# Patient Record
Sex: MALE | Race: WHITE | NOT HISPANIC OR LATINO | Employment: OTHER | URBAN - METROPOLITAN AREA
[De-identification: names, ages, dates, MRNs, and addresses within clinical notes are randomized per-mention and may not be internally consistent; named-entity substitution may affect disease eponyms.]

---

## 2017-01-03 ENCOUNTER — ALLSCRIPTS OFFICE VISIT (OUTPATIENT)
Dept: OTHER | Facility: OTHER | Age: 82
End: 2017-01-03

## 2017-05-08 ENCOUNTER — ALLSCRIPTS OFFICE VISIT (OUTPATIENT)
Dept: OTHER | Facility: OTHER | Age: 82
End: 2017-05-08

## 2017-05-08 DIAGNOSIS — M54.9 DORSALGIA: ICD-10-CM

## 2017-06-01 ENCOUNTER — HOSPITAL ENCOUNTER (OUTPATIENT)
Dept: NON INVASIVE DIAGNOSTICS | Facility: CLINIC | Age: 82
Discharge: HOME | End: 2017-06-01
Payer: MEDICARE

## 2017-06-01 DIAGNOSIS — M54.9 DORSALGIA: ICD-10-CM

## 2017-06-01 PROCEDURE — 93925 LOWER EXTREMITY STUDY: CPT

## 2017-06-01 PROCEDURE — 93923 UPR/LXTR ART STDY 3+ LVLS: CPT

## 2018-01-01 ENCOUNTER — APPOINTMENT (EMERGENCY)
Dept: RADIOLOGY | Facility: HOSPITAL | Age: 83
DRG: 282 | End: 2018-01-01
Payer: MEDICARE

## 2018-01-01 ENCOUNTER — APPOINTMENT (EMERGENCY)
Dept: CT IMAGING | Facility: HOSPITAL | Age: 83
DRG: 282 | End: 2018-01-01
Payer: MEDICARE

## 2018-01-01 ENCOUNTER — HOSPITAL ENCOUNTER (INPATIENT)
Facility: HOSPITAL | Age: 83
LOS: 4 days | Discharge: HOME HEALTH | DRG: 282 | End: 2018-06-25
Attending: EMERGENCY MEDICINE | Admitting: HOSPITALIST
Payer: MEDICARE

## 2018-01-01 ENCOUNTER — APPOINTMENT (INPATIENT)
Dept: RADIOLOGY | Facility: HOSPITAL | Age: 83
DRG: 282 | End: 2018-01-01
Payer: MEDICARE

## 2018-01-01 ENCOUNTER — APPOINTMENT (INPATIENT)
Dept: NON INVASIVE DIAGNOSTICS | Facility: HOSPITAL | Age: 83
DRG: 282 | End: 2018-01-01
Payer: MEDICARE

## 2018-01-01 VITALS
BODY MASS INDEX: 23.58 KG/M2 | HEART RATE: 68 BPM | TEMPERATURE: 97.9 F | OXYGEN SATURATION: 98 % | WEIGHT: 164.68 LBS | HEIGHT: 70 IN | DIASTOLIC BLOOD PRESSURE: 67 MMHG | SYSTOLIC BLOOD PRESSURE: 116 MMHG | RESPIRATION RATE: 20 BRPM

## 2018-01-01 DIAGNOSIS — I50.23 ACUTE ON CHRONIC SYSTOLIC CONGESTIVE HEART FAILURE (HCC): ICD-10-CM

## 2018-01-01 DIAGNOSIS — R77.8 ELEVATED TROPONIN: ICD-10-CM

## 2018-01-01 DIAGNOSIS — I48.0 PAROXYSMAL A-FIB (HCC): ICD-10-CM

## 2018-01-01 DIAGNOSIS — I21.A1 TYPE 2 MYOCARDIAL INFARCTION (HCC): ICD-10-CM

## 2018-01-01 DIAGNOSIS — I50.9 CHF (CONGESTIVE HEART FAILURE) (HCC): Primary | ICD-10-CM

## 2018-01-01 DIAGNOSIS — R79.89 RAISED TSH LEVEL: ICD-10-CM

## 2018-01-01 DIAGNOSIS — J90 PLEURAL EFFUSION, BILATERAL: ICD-10-CM

## 2018-01-01 DIAGNOSIS — J18.9 PNEUMONIA: ICD-10-CM

## 2018-01-01 DIAGNOSIS — I10 ESSENTIAL HYPERTENSION: ICD-10-CM

## 2018-01-01 DIAGNOSIS — L03.115 CELLULITIS OF BOTH LOWER EXTREMITIES: ICD-10-CM

## 2018-01-01 DIAGNOSIS — L03.116 CELLULITIS OF BOTH LOWER EXTREMITIES: ICD-10-CM

## 2018-01-01 DIAGNOSIS — I50.9 ACUTE CONGESTIVE HEART FAILURE, UNSPECIFIED HEART FAILURE TYPE (HCC): ICD-10-CM

## 2018-01-01 DIAGNOSIS — I42.9 CARDIOMYOPATHY (HCC): ICD-10-CM

## 2018-01-01 DIAGNOSIS — I48.91 ATRIAL FIBRILLATION (HCC): ICD-10-CM

## 2018-01-01 DIAGNOSIS — I87.2 CHRONIC VENOUS INSUFFICIENCY: ICD-10-CM

## 2018-01-01 LAB
ALBUMIN SERPL BCP-MCNC: 3.3 G/DL (ref 3.5–5)
ALP SERPL-CCNC: 99 U/L (ref 46–116)
ALT SERPL W P-5'-P-CCNC: 37 U/L (ref 12–78)
ANION GAP SERPL CALCULATED.3IONS-SCNC: 1 MMOL/L (ref 4–13)
ANION GAP SERPL CALCULATED.3IONS-SCNC: 3 MMOL/L (ref 4–13)
ANION GAP SERPL CALCULATED.3IONS-SCNC: 5 MMOL/L (ref 4–13)
ANION GAP SERPL CALCULATED.3IONS-SCNC: 6 MMOL/L (ref 4–13)
ANION GAP SERPL CALCULATED.3IONS-SCNC: 7 MMOL/L (ref 4–13)
APTT PPP: 30 SECONDS (ref 24–36)
ARTERIAL PATENCY WRIST A: YES
AST SERPL W P-5'-P-CCNC: 37 U/L (ref 5–45)
ATRIAL RATE: 105 BPM
BACTERIA BLD CULT: NORMAL
BACTERIA BLD CULT: NORMAL
BASE EXCESS BLDA CALC-SCNC: 3.1 MMOL/L
BASOPHILS # BLD AUTO: 0.02 THOUSANDS/ΜL (ref 0–0.1)
BASOPHILS NFR BLD AUTO: 0 % (ref 0–1)
BILIRUB SERPL-MCNC: 0.5 MG/DL (ref 0.2–1)
BUN SERPL-MCNC: 30 MG/DL (ref 5–25)
BUN SERPL-MCNC: 31 MG/DL (ref 5–25)
BUN SERPL-MCNC: 35 MG/DL (ref 5–25)
BUN SERPL-MCNC: 44 MG/DL (ref 5–25)
BUN SERPL-MCNC: 47 MG/DL (ref 5–25)
CALCIUM SERPL-MCNC: 8.2 MG/DL (ref 8.3–10.1)
CALCIUM SERPL-MCNC: 8.3 MG/DL (ref 8.3–10.1)
CALCIUM SERPL-MCNC: 8.7 MG/DL (ref 8.3–10.1)
CALCIUM SERPL-MCNC: 8.9 MG/DL (ref 8.3–10.1)
CALCIUM SERPL-MCNC: 8.9 MG/DL (ref 8.3–10.1)
CHLORIDE SERPL-SCNC: 102 MMOL/L (ref 100–108)
CHLORIDE SERPL-SCNC: 102 MMOL/L (ref 100–108)
CHLORIDE SERPL-SCNC: 103 MMOL/L (ref 100–108)
CHLORIDE SERPL-SCNC: 103 MMOL/L (ref 100–108)
CHLORIDE SERPL-SCNC: 104 MMOL/L (ref 100–108)
CO2 SERPL-SCNC: 31 MMOL/L (ref 21–32)
CO2 SERPL-SCNC: 32 MMOL/L (ref 21–32)
CO2 SERPL-SCNC: 35 MMOL/L (ref 21–32)
CO2 SERPL-SCNC: 36 MMOL/L (ref 21–32)
CO2 SERPL-SCNC: 36 MMOL/L (ref 21–32)
CREAT SERPL-MCNC: 1.02 MG/DL (ref 0.6–1.3)
CREAT SERPL-MCNC: 1.05 MG/DL (ref 0.6–1.3)
CREAT SERPL-MCNC: 1.12 MG/DL (ref 0.6–1.3)
CREAT SERPL-MCNC: 1.16 MG/DL (ref 0.6–1.3)
CREAT SERPL-MCNC: 1.24 MG/DL (ref 0.6–1.3)
DEPRECATED D DIMER PPP: 1490 NG/ML (FEU) (ref 0–424)
EOSINOPHIL # BLD AUTO: 0.03 THOUSAND/ΜL (ref 0–0.61)
EOSINOPHIL NFR BLD AUTO: 1 % (ref 0–6)
ERYTHROCYTE [DISTWIDTH] IN BLOOD BY AUTOMATED COUNT: 15.4 % (ref 11.6–15.1)
ERYTHROCYTE [DISTWIDTH] IN BLOOD BY AUTOMATED COUNT: 15.5 % (ref 11.6–15.1)
GFR SERPL CREATININE-BSD FRML MDRD: 54 ML/MIN/1.73SQ M
GFR SERPL CREATININE-BSD FRML MDRD: 59 ML/MIN/1.73SQ M
GFR SERPL CREATININE-BSD FRML MDRD: 61 ML/MIN/1.73SQ M
GFR SERPL CREATININE-BSD FRML MDRD: 66 ML/MIN/1.73SQ M
GFR SERPL CREATININE-BSD FRML MDRD: 69 ML/MIN/1.73SQ M
GLUCOSE SERPL-MCNC: 102 MG/DL (ref 65–140)
GLUCOSE SERPL-MCNC: 82 MG/DL (ref 65–140)
GLUCOSE SERPL-MCNC: 82 MG/DL (ref 65–140)
GLUCOSE SERPL-MCNC: 85 MG/DL (ref 65–140)
GLUCOSE SERPL-MCNC: 89 MG/DL (ref 65–140)
HCO3 BLDA-SCNC: 28.6 MMOL/L (ref 22–28)
HCT VFR BLD AUTO: 39.2 % (ref 36.5–49.3)
HCT VFR BLD AUTO: 41.4 % (ref 36.5–49.3)
HGB BLD-MCNC: 11.9 G/DL (ref 12–17)
HGB BLD-MCNC: 12.7 G/DL (ref 12–17)
INR PPP: 1.08 (ref 0.86–1.17)
L PNEUMO1 AG UR QL IA.RAPID: NEGATIVE
LACTATE SERPL-SCNC: 1.8 MMOL/L (ref 0.5–2)
LYMPHOCYTES # BLD AUTO: 1.37 THOUSANDS/ΜL (ref 0.6–4.47)
LYMPHOCYTES NFR BLD AUTO: 27 % (ref 14–44)
MCH RBC QN AUTO: 29.4 PG (ref 26.8–34.3)
MCH RBC QN AUTO: 29.5 PG (ref 26.8–34.3)
MCHC RBC AUTO-ENTMCNC: 30.4 G/DL (ref 31.4–37.4)
MCHC RBC AUTO-ENTMCNC: 30.7 G/DL (ref 31.4–37.4)
MCV RBC AUTO: 96 FL (ref 82–98)
MCV RBC AUTO: 97 FL (ref 82–98)
MONOCYTES # BLD AUTO: 0.49 THOUSAND/ΜL (ref 0.17–1.22)
MONOCYTES NFR BLD AUTO: 10 % (ref 4–12)
NEUTROPHILS # BLD AUTO: 3.26 THOUSANDS/ΜL (ref 1.85–7.62)
NEUTS SEG NFR BLD AUTO: 63 % (ref 43–75)
NON VENT ROOM AIR: 21 %
NT-PROBNP SERPL-MCNC: 6165 PG/ML
O2 CT BLDA-SCNC: 17.2 ML/DL (ref 16–23)
OXYHGB MFR BLDA: 93.2 % (ref 94–97)
P AXIS: 94 DEGREES
PCO2 BLDA: 47 MM HG (ref 36–44)
PH BLDA: 7.4 [PH] (ref 7.35–7.45)
PLATELET # BLD AUTO: 108 THOUSANDS/UL (ref 149–390)
PLATELET # BLD AUTO: 112 THOUSANDS/UL (ref 149–390)
PLATELET # BLD AUTO: 116 THOUSANDS/UL (ref 149–390)
PMV BLD AUTO: 12.6 FL (ref 8.9–12.7)
PMV BLD AUTO: 13 FL (ref 8.9–12.7)
PMV BLD AUTO: 13.2 FL (ref 8.9–12.7)
PO2 BLDA: 73.6 MM HG (ref 75–129)
POTASSIUM SERPL-SCNC: 3.9 MMOL/L (ref 3.5–5.3)
POTASSIUM SERPL-SCNC: 4 MMOL/L (ref 3.5–5.3)
POTASSIUM SERPL-SCNC: 4 MMOL/L (ref 3.5–5.3)
POTASSIUM SERPL-SCNC: 4.1 MMOL/L (ref 3.5–5.3)
POTASSIUM SERPL-SCNC: 4.1 MMOL/L (ref 3.5–5.3)
PROCALCITONIN SERPL-MCNC: <0.05 NG/ML
PROT SERPL-MCNC: 7.3 G/DL (ref 6.4–8.2)
PROTHROMBIN TIME: 13.7 SECONDS (ref 11.8–14.2)
QRS AXIS: -39 DEGREES
QRSD INTERVAL: 94 MS
QT INTERVAL: 376 MS
QTC INTERVAL: 496 MS
RBC # BLD AUTO: 4.04 MILLION/UL (ref 3.88–5.62)
RBC # BLD AUTO: 4.32 MILLION/UL (ref 3.88–5.62)
S PNEUM AG UR QL: NEGATIVE
SODIUM SERPL-SCNC: 140 MMOL/L (ref 136–145)
SODIUM SERPL-SCNC: 141 MMOL/L (ref 136–145)
SODIUM SERPL-SCNC: 141 MMOL/L (ref 136–145)
SODIUM SERPL-SCNC: 142 MMOL/L (ref 136–145)
SODIUM SERPL-SCNC: 142 MMOL/L (ref 136–145)
SPECIMEN SOURCE: ABNORMAL
T WAVE AXIS: 65 DEGREES
TROPONIN I SERPL-MCNC: 0.11 NG/ML
TROPONIN I SERPL-MCNC: 0.12 NG/ML
TROPONIN I SERPL-MCNC: 0.14 NG/ML
TROPONIN I SERPL-MCNC: 0.15 NG/ML
TROPONIN I SERPL-MCNC: 0.16 NG/ML
TSH SERPL DL<=0.05 MIU/L-ACNC: 4.49 UIU/ML (ref 0.36–3.74)
VENTRICULAR RATE: 105 BPM
WBC # BLD AUTO: 5.17 THOUSAND/UL (ref 4.31–10.16)
WBC # BLD AUTO: 5.87 THOUSAND/UL (ref 4.31–10.16)

## 2018-01-01 PROCEDURE — 87040 BLOOD CULTURE FOR BACTERIA: CPT | Performed by: EMERGENCY MEDICINE

## 2018-01-01 PROCEDURE — 80048 BASIC METABOLIC PNL TOTAL CA: CPT | Performed by: INTERNAL MEDICINE

## 2018-01-01 PROCEDURE — 93010 ELECTROCARDIOGRAM REPORT: CPT | Performed by: INTERNAL MEDICINE

## 2018-01-01 PROCEDURE — 84484 ASSAY OF TROPONIN QUANT: CPT | Performed by: PHYSICIAN ASSISTANT

## 2018-01-01 PROCEDURE — 99232 SBSQ HOSP IP/OBS MODERATE 35: CPT | Performed by: INTERNAL MEDICINE

## 2018-01-01 PROCEDURE — G8978 MOBILITY CURRENT STATUS: HCPCS

## 2018-01-01 PROCEDURE — 83605 ASSAY OF LACTIC ACID: CPT | Performed by: EMERGENCY MEDICINE

## 2018-01-01 PROCEDURE — 99285 EMERGENCY DEPT VISIT HI MDM: CPT

## 2018-01-01 PROCEDURE — 99223 1ST HOSP IP/OBS HIGH 75: CPT | Performed by: INTERNAL MEDICINE

## 2018-01-01 PROCEDURE — 85730 THROMBOPLASTIN TIME PARTIAL: CPT | Performed by: EMERGENCY MEDICINE

## 2018-01-01 PROCEDURE — 36415 COLL VENOUS BLD VENIPUNCTURE: CPT | Performed by: EMERGENCY MEDICINE

## 2018-01-01 PROCEDURE — 84145 PROCALCITONIN (PCT): CPT | Performed by: INTERNAL MEDICINE

## 2018-01-01 PROCEDURE — 82805 BLOOD GASES W/O2 SATURATION: CPT | Performed by: EMERGENCY MEDICINE

## 2018-01-01 PROCEDURE — 94640 AIRWAY INHALATION TREATMENT: CPT

## 2018-01-01 PROCEDURE — 85610 PROTHROMBIN TIME: CPT | Performed by: EMERGENCY MEDICINE

## 2018-01-01 PROCEDURE — 93306 TTE W/DOPPLER COMPLETE: CPT | Performed by: INTERNAL MEDICINE

## 2018-01-01 PROCEDURE — 93005 ELECTROCARDIOGRAM TRACING: CPT

## 2018-01-01 PROCEDURE — 97530 THERAPEUTIC ACTIVITIES: CPT

## 2018-01-01 PROCEDURE — 90715 TDAP VACCINE 7 YRS/> IM: CPT | Performed by: EMERGENCY MEDICINE

## 2018-01-01 PROCEDURE — 71045 X-RAY EXAM CHEST 1 VIEW: CPT

## 2018-01-01 PROCEDURE — 71275 CT ANGIOGRAPHY CHEST: CPT

## 2018-01-01 PROCEDURE — 99233 SBSQ HOSP IP/OBS HIGH 50: CPT | Performed by: INTERNAL MEDICINE

## 2018-01-01 PROCEDURE — 84484 ASSAY OF TROPONIN QUANT: CPT | Performed by: EMERGENCY MEDICINE

## 2018-01-01 PROCEDURE — 99239 HOSP IP/OBS DSCHRG MGMT >30: CPT | Performed by: INTERNAL MEDICINE

## 2018-01-01 PROCEDURE — 85379 FIBRIN DEGRADATION QUANT: CPT | Performed by: EMERGENCY MEDICINE

## 2018-01-01 PROCEDURE — 36600 WITHDRAWAL OF ARTERIAL BLOOD: CPT

## 2018-01-01 PROCEDURE — 85027 COMPLETE CBC AUTOMATED: CPT | Performed by: PHYSICIAN ASSISTANT

## 2018-01-01 PROCEDURE — 97163 PT EVAL HIGH COMPLEX 45 MIN: CPT

## 2018-01-01 PROCEDURE — 94664 DEMO&/EVAL PT USE INHALER: CPT

## 2018-01-01 PROCEDURE — 93306 TTE W/DOPPLER COMPLETE: CPT

## 2018-01-01 PROCEDURE — 87449 NOS EACH ORGANISM AG IA: CPT | Performed by: PHYSICIAN ASSISTANT

## 2018-01-01 PROCEDURE — 85025 COMPLETE CBC W/AUTO DIFF WBC: CPT | Performed by: EMERGENCY MEDICINE

## 2018-01-01 PROCEDURE — 80053 COMPREHEN METABOLIC PANEL: CPT | Performed by: EMERGENCY MEDICINE

## 2018-01-01 PROCEDURE — 99222 1ST HOSP IP/OBS MODERATE 55: CPT | Performed by: INTERNAL MEDICINE

## 2018-01-01 PROCEDURE — 84484 ASSAY OF TROPONIN QUANT: CPT | Performed by: INTERNAL MEDICINE

## 2018-01-01 PROCEDURE — 83880 ASSAY OF NATRIURETIC PEPTIDE: CPT | Performed by: EMERGENCY MEDICINE

## 2018-01-01 PROCEDURE — 85049 AUTOMATED PLATELET COUNT: CPT | Performed by: PHYSICIAN ASSISTANT

## 2018-01-01 PROCEDURE — G8979 MOBILITY GOAL STATUS: HCPCS

## 2018-01-01 PROCEDURE — 80048 BASIC METABOLIC PNL TOTAL CA: CPT | Performed by: PHYSICIAN ASSISTANT

## 2018-01-01 PROCEDURE — 84443 ASSAY THYROID STIM HORMONE: CPT | Performed by: EMERGENCY MEDICINE

## 2018-01-01 RX ORDER — ALBUMIN, HUMAN INJ 5% 5 %
12.5 SOLUTION INTRAVENOUS ONCE
Status: COMPLETED | OUTPATIENT
Start: 2018-01-01 | End: 2018-01-01

## 2018-01-01 RX ORDER — ASPIRIN 81 MG/1
81 TABLET, CHEWABLE ORAL DAILY
Qty: 30 TABLET | Refills: 0 | Status: SHIPPED | OUTPATIENT
Start: 2018-01-01 | End: 2019-01-01 | Stop reason: HOSPADM

## 2018-01-01 RX ORDER — METOPROLOL SUCCINATE 50 MG/1
50 TABLET, EXTENDED RELEASE ORAL 2 TIMES DAILY
Qty: 60 TABLET | Refills: 0 | Status: SHIPPED | OUTPATIENT
Start: 2018-01-01 | End: 2019-01-01 | Stop reason: CLARIF

## 2018-01-01 RX ORDER — NITROGLYCERIN 0.4 MG/1
0.4 TABLET SUBLINGUAL
Status: DISCONTINUED | OUTPATIENT
Start: 2018-01-01 | End: 2018-01-01 | Stop reason: HOSPADM

## 2018-01-01 RX ORDER — FUROSEMIDE 10 MG/ML
20 INJECTION INTRAMUSCULAR; INTRAVENOUS ONCE
Status: COMPLETED | OUTPATIENT
Start: 2018-01-01 | End: 2018-01-01

## 2018-01-01 RX ORDER — LISINOPRIL 10 MG/1
10 TABLET ORAL DAILY
Status: DISCONTINUED | OUTPATIENT
Start: 2018-01-01 | End: 2018-01-01 | Stop reason: HOSPADM

## 2018-01-01 RX ORDER — VANCOMYCIN HYDROCHLORIDE 1 G/200ML
1000 INJECTION, SOLUTION INTRAVENOUS ONCE
Status: DISCONTINUED | OUTPATIENT
Start: 2018-01-01 | End: 2018-01-01

## 2018-01-01 RX ORDER — FUROSEMIDE 40 MG/1
40 TABLET ORAL DAILY
Qty: 30 TABLET | Refills: 0 | Status: SHIPPED | OUTPATIENT
Start: 2018-01-01 | End: 2018-01-01

## 2018-01-01 RX ORDER — ASPIRIN 81 MG/1
81 TABLET, CHEWABLE ORAL DAILY
Status: DISCONTINUED | OUTPATIENT
Start: 2018-01-01 | End: 2018-01-01 | Stop reason: HOSPADM

## 2018-01-01 RX ORDER — ACETAMINOPHEN 325 MG/1
650 TABLET ORAL EVERY 6 HOURS PRN
Status: DISCONTINUED | OUTPATIENT
Start: 2018-01-01 | End: 2018-01-01 | Stop reason: HOSPADM

## 2018-01-01 RX ORDER — METOPROLOL TARTRATE 50 MG/1
50 TABLET, FILM COATED ORAL EVERY 12 HOURS SCHEDULED
Status: DISCONTINUED | OUTPATIENT
Start: 2018-01-01 | End: 2018-01-01

## 2018-01-01 RX ORDER — METOPROLOL SUCCINATE 50 MG/1
50 TABLET, EXTENDED RELEASE ORAL 2 TIMES DAILY
Status: DISCONTINUED | OUTPATIENT
Start: 2018-01-01 | End: 2018-01-01

## 2018-01-01 RX ORDER — FINASTERIDE 5 MG/1
5 TABLET, FILM COATED ORAL DAILY
Status: DISCONTINUED | OUTPATIENT
Start: 2018-01-01 | End: 2018-01-01 | Stop reason: HOSPADM

## 2018-01-01 RX ORDER — METOPROLOL SUCCINATE 50 MG/1
50 TABLET, EXTENDED RELEASE ORAL 2 TIMES DAILY
Status: DISCONTINUED | OUTPATIENT
Start: 2018-01-01 | End: 2018-01-01 | Stop reason: HOSPADM

## 2018-01-01 RX ORDER — FUROSEMIDE 40 MG/1
40 TABLET ORAL DAILY
Status: DISCONTINUED | OUTPATIENT
Start: 2018-01-01 | End: 2018-01-01

## 2018-01-01 RX ORDER — ALBUTEROL SULFATE 2.5 MG/3ML
5 SOLUTION RESPIRATORY (INHALATION) ONCE
Status: COMPLETED | OUTPATIENT
Start: 2018-01-01 | End: 2018-01-01

## 2018-01-01 RX ORDER — FUROSEMIDE 10 MG/ML
40 INJECTION INTRAMUSCULAR; INTRAVENOUS 2 TIMES DAILY
Status: DISPENSED | OUTPATIENT
Start: 2018-01-01 | End: 2018-01-01

## 2018-01-01 RX ORDER — LISINOPRIL 5 MG/1
5 TABLET ORAL DAILY
Status: DISCONTINUED | OUTPATIENT
Start: 2018-01-01 | End: 2018-01-01

## 2018-01-01 RX ORDER — METOPROLOL SUCCINATE 50 MG/1
50 TABLET, EXTENDED RELEASE ORAL 2 TIMES DAILY
Qty: 60 TABLET | Refills: 0 | Status: SHIPPED | OUTPATIENT
Start: 2018-01-01 | End: 2018-01-01

## 2018-01-01 RX ORDER — LISINOPRIL 10 MG/1
10 TABLET ORAL DAILY
Qty: 30 TABLET | Refills: 0 | Status: SHIPPED | OUTPATIENT
Start: 2018-01-01 | End: 2018-01-01

## 2018-01-01 RX ORDER — AZITHROMYCIN 250 MG/1
500 TABLET, FILM COATED ORAL ONCE
Status: COMPLETED | OUTPATIENT
Start: 2018-01-01 | End: 2018-01-01

## 2018-01-01 RX ORDER — FUROSEMIDE 10 MG/ML
40 INJECTION INTRAMUSCULAR; INTRAVENOUS
Status: DISCONTINUED | OUTPATIENT
Start: 2018-01-01 | End: 2018-01-01

## 2018-01-01 RX ORDER — ONDANSETRON 2 MG/ML
4 INJECTION INTRAMUSCULAR; INTRAVENOUS EVERY 6 HOURS PRN
Status: DISCONTINUED | OUTPATIENT
Start: 2018-01-01 | End: 2018-01-01 | Stop reason: HOSPADM

## 2018-01-01 RX ORDER — LISINOPRIL 10 MG/1
10 TABLET ORAL DAILY
Qty: 30 TABLET | Refills: 0 | Status: SHIPPED | OUTPATIENT
Start: 2018-01-01 | End: 2019-01-01 | Stop reason: HOSPADM

## 2018-01-01 RX ORDER — LISINOPRIL 2.5 MG/1
2.5 TABLET ORAL DAILY
Status: DISCONTINUED | OUTPATIENT
Start: 2018-01-01 | End: 2018-01-01

## 2018-01-01 RX ORDER — FUROSEMIDE 40 MG/1
40 TABLET ORAL
Status: DISCONTINUED | OUTPATIENT
Start: 2018-01-01 | End: 2018-01-01

## 2018-01-01 RX ORDER — FUROSEMIDE 40 MG/1
40 TABLET ORAL DAILY
Qty: 30 TABLET | Refills: 0 | Status: SHIPPED | OUTPATIENT
Start: 2018-01-01 | End: 2019-01-01 | Stop reason: HOSPADM

## 2018-01-01 RX ORDER — ALUMINUM ZIRCONIUM OCTACHLOROHYDREX GLY 16 G/100G
1 GEL TOPICAL DAILY
COMMUNITY
Start: 2017-01-03 | End: 2019-01-01 | Stop reason: HOSPADM

## 2018-01-01 RX ORDER — FUROSEMIDE 10 MG/ML
40 INJECTION INTRAMUSCULAR; INTRAVENOUS
Status: COMPLETED | OUTPATIENT
Start: 2018-01-01 | End: 2018-01-01

## 2018-01-01 RX ORDER — ASPIRIN 81 MG/1
162 TABLET, CHEWABLE ORAL ONCE
Status: COMPLETED | OUTPATIENT
Start: 2018-01-01 | End: 2018-01-01

## 2018-01-01 RX ORDER — FUROSEMIDE 40 MG/1
40 TABLET ORAL
Status: DISCONTINUED | OUTPATIENT
Start: 2018-01-01 | End: 2018-01-01 | Stop reason: HOSPADM

## 2018-01-01 RX ORDER — ASPIRIN 81 MG/1
81 TABLET, CHEWABLE ORAL DAILY
Qty: 30 TABLET | Refills: 0 | Status: SHIPPED | OUTPATIENT
Start: 2018-01-01 | End: 2018-01-01

## 2018-01-01 RX ADMIN — FINASTERIDE 5 MG: 5 TABLET, FILM COATED ORAL at 08:54

## 2018-01-01 RX ADMIN — LISINOPRIL 2.5 MG: 2.5 TABLET ORAL at 09:19

## 2018-01-01 RX ADMIN — FINASTERIDE 5 MG: 5 TABLET, FILM COATED ORAL at 12:11

## 2018-01-01 RX ADMIN — FUROSEMIDE 40 MG: 40 TABLET ORAL at 08:54

## 2018-01-01 RX ADMIN — ASPIRIN 81 MG 81 MG: 81 TABLET ORAL at 08:54

## 2018-01-01 RX ADMIN — FUROSEMIDE 40 MG: 10 INJECTION, SOLUTION INTRAMUSCULAR; INTRAVENOUS at 11:27

## 2018-01-01 RX ADMIN — ASPIRIN 81 MG 81 MG: 81 TABLET ORAL at 12:11

## 2018-01-01 RX ADMIN — TETANUS TOXOID, REDUCED DIPHTHERIA TOXOID AND ACELLULAR PERTUSSIS VACCINE, ADSORBED 0.5 ML: 5; 2.5; 8; 8; 2.5 SUSPENSION INTRAMUSCULAR at 05:55

## 2018-01-01 RX ADMIN — ASPIRIN 81 MG 81 MG: 81 TABLET ORAL at 09:19

## 2018-01-01 RX ADMIN — METOPROLOL SUCCINATE 50 MG: 50 TABLET, EXTENDED RELEASE ORAL at 16:54

## 2018-01-01 RX ADMIN — AZITHROMYCIN 500 MG: 250 TABLET, FILM COATED ORAL at 06:10

## 2018-01-01 RX ADMIN — ENOXAPARIN SODIUM 40 MG: 100 INJECTION SUBCUTANEOUS at 09:19

## 2018-01-01 RX ADMIN — FUROSEMIDE 20 MG: 10 INJECTION, SOLUTION INTRAMUSCULAR; INTRAVENOUS at 05:56

## 2018-01-01 RX ADMIN — FINASTERIDE 5 MG: 5 TABLET, FILM COATED ORAL at 08:29

## 2018-01-01 RX ADMIN — METOPROLOL SUCCINATE 50 MG: 50 TABLET, EXTENDED RELEASE ORAL at 08:29

## 2018-01-01 RX ADMIN — FUROSEMIDE 40 MG: 10 INJECTION, SOLUTION INTRAMUSCULAR; INTRAVENOUS at 16:54

## 2018-01-01 RX ADMIN — METOPROLOL TARTRATE 50 MG: 50 TABLET ORAL at 22:36

## 2018-01-01 RX ADMIN — DILTIAZEM HYDROCHLORIDE 90 MG: 60 TABLET, FILM COATED ORAL at 11:27

## 2018-01-01 RX ADMIN — METOPROLOL SUCCINATE 50 MG: 50 TABLET, EXTENDED RELEASE ORAL at 08:56

## 2018-01-01 RX ADMIN — FINASTERIDE 5 MG: 5 TABLET, FILM COATED ORAL at 09:19

## 2018-01-01 RX ADMIN — NITROGLYCERIN 0.5 INCH: 20 OINTMENT TOPICAL at 05:56

## 2018-01-01 RX ADMIN — ASPIRIN 81 MG 162 MG: 81 TABLET ORAL at 04:23

## 2018-01-01 RX ADMIN — ENOXAPARIN SODIUM 40 MG: 100 INJECTION SUBCUTANEOUS at 08:29

## 2018-01-01 RX ADMIN — ALBUMIN HUMAN 12.5 G: 0.05 INJECTION, SOLUTION INTRAVENOUS at 01:37

## 2018-01-01 RX ADMIN — ALBUTEROL SULFATE 5 MG: 2.5 SOLUTION RESPIRATORY (INHALATION) at 02:56

## 2018-01-01 RX ADMIN — ENOXAPARIN SODIUM 40 MG: 100 INJECTION SUBCUTANEOUS at 11:26

## 2018-01-01 RX ADMIN — FINASTERIDE 5 MG: 5 TABLET, FILM COATED ORAL at 11:27

## 2018-01-01 RX ADMIN — LISINOPRIL 10 MG: 10 TABLET ORAL at 08:29

## 2018-01-01 RX ADMIN — ASPIRIN 81 MG 81 MG: 81 TABLET ORAL at 08:29

## 2018-01-01 RX ADMIN — ENOXAPARIN SODIUM 40 MG: 100 INJECTION SUBCUTANEOUS at 08:54

## 2018-01-01 RX ADMIN — LISINOPRIL 2.5 MG: 2.5 TABLET ORAL at 11:27

## 2018-01-01 RX ADMIN — METOPROLOL TARTRATE 25 MG: 25 TABLET ORAL at 15:05

## 2018-01-01 RX ADMIN — FUROSEMIDE 40 MG: 40 TABLET ORAL at 16:57

## 2018-01-01 RX ADMIN — CEFTRIAXONE 1000 MG: 1 INJECTION, POWDER, FOR SOLUTION INTRAMUSCULAR; INTRAVENOUS at 06:10

## 2018-01-01 RX ADMIN — ENOXAPARIN SODIUM 40 MG: 100 INJECTION SUBCUTANEOUS at 12:11

## 2018-01-01 RX ADMIN — METOPROLOL SUCCINATE 50 MG: 50 TABLET, EXTENDED RELEASE ORAL at 16:57

## 2018-01-01 RX ADMIN — METOPROLOL TARTRATE 25 MG: 25 TABLET ORAL at 09:19

## 2018-01-01 RX ADMIN — FUROSEMIDE 40 MG: 10 INJECTION, SOLUTION INTRAMUSCULAR; INTRAVENOUS at 09:19

## 2018-01-01 RX ADMIN — IOHEXOL 85 ML: 350 INJECTION, SOLUTION INTRAVENOUS at 04:48

## 2018-01-01 RX ADMIN — FUROSEMIDE 40 MG: 40 TABLET ORAL at 12:09

## 2018-01-11 ENCOUNTER — ALLSCRIPTS OFFICE VISIT (OUTPATIENT)
Dept: OTHER | Facility: OTHER | Age: 83
End: 2018-01-11

## 2018-01-12 VITALS
RESPIRATION RATE: 18 BRPM | SYSTOLIC BLOOD PRESSURE: 118 MMHG | HEIGHT: 69 IN | HEART RATE: 70 BPM | DIASTOLIC BLOOD PRESSURE: 60 MMHG

## 2018-01-13 NOTE — PROGRESS NOTES
Assessment   1  Chronic venous insufficiency (459 81) (I87 2)   2  Skin ulcer of left lower leg, limited to breakdown of skin (487 10) (X81 081)    Plan   Chronic venous insufficiency    · Apply moisturizing cream or lotion several times a day ; Status:Complete;   Done:    76VFL6961   Ordered; For:Chronic venous insufficiency; Ordered By:Sydni Lawrence;   · Begin or continue regular aerobic exercise  Gradually work up to at least {count1}    sessions of {dur1} of exercise a week ; Status:Complete;   Done: 21SDM9773   Ordered; For:Chronic venous insufficiency; Ordered By:Sydni Lawrence;   · Keep your leg elevated whenever you can to decrease swelling and increase circulation ;    Status:Complete;   Done: 07AHH0980   Ordered; For:Chronic venous insufficiency; Ordered By:Sydni Lawrence;   · Support stockings can help keep the blood from pooling in the small veins in your feet    and legs ; Status:Complete;   Done: 84ILF0882   Ordered; For:Chronic venous insufficiency; Ordered By:Sydni Lawrence;   · Follow-up PRN Evaluation and Treatment  Follow-up  Status: Complete  Done:    54AQY2754   Ordered; For: Chronic venous insufficiency; Ordered By: Mckenna Garza Performed:  Due: 01PVO9480    Discussion/Summary   Discussion Summary[de-identified] y/o M with history of back surgery with subsequent weakness to bilateral lower extremities, which has left him minimally ambulatory and essentially wheelchair bound  He has chronic bilateral lower extremity edema and discoloration consistent with chronic venous insufficiency  He recently sustained a fall at living facility with skin tear to proximal lateral calf  Per RN with patient the wound is healing  Wound is healthy with no signs infection  He should continue daily use of compression stockings to help manage edema and aid in healing of wound  We also discussed the benefits of frequent elevation, heel toe exercises while in wheelchair and application of moisturizers to the lower extremities  Compression stockings should be replaced every 6 months  Followup as needed  Chief Complaint   Chief Complaint Free Text Note Form: I'm here to check my legs  They are always discolored  History of Present Illness   HPI: Patient returns to office for evaluation after fall  He has history of back surgery with resultant bilateral lower extremity weakness, which has left him essentially wheelchair bound  He reports chronic back pain  He has purple discoloration to bilateral legs consistent with chronic venous insufficiency  RN accompanying patient to visit today states that when he elevates the discoloration resolves  He spends most of the day in the wheelchair  He has been compliant with wearing compression daily  He recently sustained a fall with a skin tear to his left proximal lateral calf  This wound looks healthy  RN reports it was covered by a dry eschar, which feel off yesterday  She states it is looking better  Also, with skin tear to arm  Denies any changes in edema to legs  No CP/SOB  Review of Systems   Complete Male - Vasc:      Constitutional: No fever or chills, feels well, no tiredness, no recent weight gain or weight loss  Eyes: No sudden vision loss, no blurred vision, no double vision  ENT: no loss of hearing, no nosebleeds, no hoarseness  Cardiovascular: regular heart rate  Respiratory: No sob, no wheezing, no cough, no sob with exertion, no orthopnea  Gastrointestinal: No nausea, No vomiting, no diarrhea, no blood in stool  Musculoskeletal: joint swelling,-- limb pain,-- myalgias,-- joint stiffness-- and-- limb swelling, but-- lumbar pain  Neurological: limb weakness-- and-- difficulty walking  Psychiatric: no depression, no mood disorders, no anxiety  Hematologic/Lymphatic: no bleeding disorder, no easy bruising  ROS Reviewed:    ROS reviewed  Active Problems   1  Cervical spinal stenosis (723 0) (M48 02)   2   Cervical spondylosis (721 0) (M47 812)   3  Cervical spondylosis with myelopathy (721 1) (M47 12)   4  Chronic back pain (724 5,338 29) (M54 9,G89 29)   5  Chronic bilateral low back pain with bilateral sciatica (724 2,724 3,338 29)     (M54 42,M54 41,G89 29)   6  Chronic venous insufficiency (459 81) (I87 2)   7  Degeneration of cervical intervertebral disc (722 4) (M50 90)   8  Diarrhea (787 91) (R19 7)   9  Disc degeneration, 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 36)   10  Discitis of lumbar region (722 93) (M46 46)   11  Lumbar canal stenosis (724 02) (M48 061)   12  Osteomyelitis (730 20) (M86 9)   13  Wears glasses (V49 89) (Z97 3)    Past Medical History   1  History of Anemia (285 9) (D64 9)   2  History of Coordination problem (781 3) (R27 9)   3  History of Hearing loss (389 9) (H91 90)   4  History of anemia (V12 3) (Z86 2)   5  History of arthritis (V13 4) (Z87 39)   6  History of hypertension (V12 59) (Z86 79)   7  History of urinary incontinence (V13 09) (Z87 898)   8  History of Poor balance (781 99) (R26 89)   9  History of Swelling of hand (729 81) (M79 89)   10  History of Wears hearing aid (V45 89) (Z97 4)  Active Problems And Past Medical History Reviewed: The active problems and past medical history were reviewed and updated today  Surgical History   1  History of Back Surgery   2  History of Hip Surgery   3  History of Rotator Cuff Repair  Surgical History Reviewed: The surgical history was reviewed and updated today  Family History   Family History    1  No pertinent family history  Family History Reviewed: The family history was reviewed and updated today  Social History    · Denied: History of Alcohol use   · Denied: History of Drug use   ·    · No known STD risk factors  Social History Reviewed: The social history was reviewed and updated today  Current Meds    1  Align Oral Capsule; take 1 capsule daily; Therapy: 98CPP4165 to (Evaluate:84Ujl2479);  Last QK:89JXD6547 Ordered   2  Atorvastatin Calcium 10 MG Oral Tablet; Therapy: 76Egx6207 to Recorded   3  Bisacodyl EC 5 MG Oral Tablet Delayed Release; TAKE 2 TABLET ONCE; Therapy: 68YCU5173 to (Last Rx:27Oct2016) Ordered   4  Debrox 6 5 % Otic Solution; Therapy: (Recorded:27Oct2016) to Recorded   5  DilTIAZem HCl ER 90 MG Oral Capsule Extended Release 12 Hour; Therapy: (Recorded:27Oct2016) to Recorded   6  Finasteride 5 MG Oral Tablet; Therapy: 07Wfy0881 to Recorded   7  Furosemide 20 MG Oral Tablet; take 1 tablet by mouth every day; Therapy: 68BTO1657 to (Evaluate:05Kyw3532) Recorded   8  Imodium A-D 2 MG Oral Tablet; TAKE 1 TABLET Daily PRN diarrhea; Therapy: 61ETO5157 to (Evaluate:04Qcn6138); Last Rx:03Jan2017 Ordered   9  Lisinopril 2 5 MG Oral Tablet; Therapy: (Recorded:27Oct2016) to Recorded   10  Multi Vitamin Daily Oral Tablet; TAKE 1 TABLET DAILY; Therapy: 91JIV7558 to (Evaluate:08Bwl7608) Recorded   11  Polyethylene Glycol 3350 Oral Powder; USE AS DIRECTED; Therapy: 02CEF4280 to (Evaluate:28Oct2016); Last Rx:27Oct2016 Ordered   12  Potassium Chloride 10 MEQ TBCR; Therapy: (Recorded:27Oct2016) to Recorded   13  Tylenol 325 MG Oral Tablet; TAKE 1 TO 2 TABLETS EVERY 6 HOURS AS NEEDED; Therapy: (Recorded:30Oct2014) to Recorded  Medication List Reviewed: The medication list was reviewed and updated today  Allergies   1  Morphine Sulfate TABS    Vitals   Vital Signs    Recorded: 13MAJ2676 10:09AM   Temperature 98 5 F, Tympanic   Heart Rate 76, R Radial   Pulse Quality Normal, R Radial   Respiration Quality Normal   Respiration 16   Systolic 195, RUE, Sitting   Diastolic 62, RUE, Sitting   Height 5 ft 9 in   Weight Unobtainable Yes     Physical Exam        Posterior tibialis: right 0-- and-- left 0  Dorsalis pedis: right 0-- and-- left 0  Distal Pulse Exam: This patient had dopplerable pulses in these locations: right triphasic PT,-- left triphasic PT   Normal Capillary Refill  Extremities: bilateral ankle 1+ pitting edema-- and-- bilateral foot 2+ pitting edema  LE Varicose Veins: right leg truncal veins  The heart rate was normal  The rhythm was regular  Pulmonary      Respiratory effort: No increased work of breathing or signs of respiratory distress  Auscultation of lungs: Clear to auscultation  No wheezing, no rales, no rhonchi  Psychiatric      Orientation to person, place and time: Normal       Eyes      Conjunctiva and lids: No swelling, erythema, or discharge  Musculoskeletal      Gait and station:-- Seen in wheelchair  Skin Venous congestion bilateral lower extremities  Pinpoint dry eschar right second toe, callus to lateral right first metatarsal, skin tear to left proximal lateral calf with no signs infection  Results/Data   VAS LOWER LIMB ARTERIAL DUPLEX, COMPLETE BILATERAL/GRAFTS [id number] 01:42PM Jewelene Buerger TW Order Number: [id number]       - Patient Instructions: To schedule this appointment, please contact Central Scheduling at [phone]  Test Name Result Flag Reference   VAS LOWER LIMB ARTERIAL DUPLEX, COMPLETE BILATERAL/GRAFTS (Report)     [facility] REPORT      CLINICAL:      Indications: Bilateral Atherosclerosis with Claudication [I70 219]  He has history of back pain/ spinal surgery in the past  He mentions that this      has left him weak in both extremities  Risk Factors: The patient has history of Hypertension  He has no history of      Diabetes or Hyperlipidemia  Right Brachial Pressure: 104/ mmHg, Left Brachial Pressure: 110/ mmHg                    FINDINGS:            Segment        Rig    Left                           PSV EDV PSV EDV       Common Femoral Artery  46  0  67  10       Prox Profunda      43  0  59  0       Prox SFA        71  0  89  0       Mid SFA         57  0  61  0       Dist SFA        48  0  44  0       Proximal Pop      51  0  62  0 Distal Pop       33  1  34  0       Dist Post Tibial    59  0  73  9       Dist  Ant  Tibial    31  0  39  1                         CONCLUSION:      Impression:      RIGHT LOWER LIMB:      Diffuse disease throughout the femoral and popliteal arteries without evidence      of a focal stenosis  Ankle/Brachial index: Posterior tibial artery is unreliable  Dorsalis pedis      pressure 1 03 (Normal Range)      PVR/ PPG tracings are dampened  Metatarsal pressure is unreliable  Great toe pressure of 81mmHg, within the healing range            LEFT LOWER LIMB:      Diffuse disease throughout the femoral and popliteal arteries without evidence      of a focal stenosis  Ankle/Brachial index: 1 32 (Normal Range)      PVR/ PPG tracings are dampened        Metatarsal pressure of 142mmHg      Great toe pressure of 58mmHg, within the healing range            SIGNATURE:      Electronically Signed by: Franco Porter on 2017-06-02 07:21:45 PM      Signatures    Electronically signed by : Aaron Nicholas; Jan 11 2018 11:01AM EST                       (Author)     Electronically signed by : Dany Sidhu MD; Jan 11 2018  5:18PM EST

## 2018-01-14 VITALS
OXYGEN SATURATION: 99 % | WEIGHT: 151 LBS | RESPIRATION RATE: 16 BRPM | TEMPERATURE: 98.4 F | SYSTOLIC BLOOD PRESSURE: 112 MMHG | BODY MASS INDEX: 22.3 KG/M2 | DIASTOLIC BLOOD PRESSURE: 68 MMHG | HEART RATE: 72 BPM

## 2018-01-15 NOTE — RESULT NOTES
Verified Results  (1) TISSUE EXAM [id number] 10:25AM Saunders Gosselin     Test Name Result Flag Reference   LAB AP CASE REPORT (Report)     Surgical Pathology Report             Case: [id number]                   Authorizing Provider: Walter Najera MD      Collected:      11/03/2016 1025        Ordering Location:   [address] [place] [address]   Received:      11/03/2016 [address] [facility]                               Pathologist:      Cristina Richmond MD                                 Specimens:  A) - Small Bowel, NOS, Terminal ileum                                 B) - Colon, Random colon cold bx                                    C) - Polyp, Colorectal, Transverse colon polyp, cold bx                        D) - Polyp, Colorectal, Sigmoid colon polyp, cold bx   LAB AP FINAL DIAGNOSIS (Report)     A  Terminal ileum (biopsy): - Ileal mucosa with no diagnostic abnormality  - Negative for dysplasia     B  Colon (biopsy):  - Colonic mucosa with reactive lymphoid aggregate  - No acute, chronic or microscopic colitis  - No granulomas  - Negative for dysplasia     C  Transverse colon polyp (biopsy):  - Colonic mucosa with no diagnostic abnormality  - Negative for dysplasia     D  Sigmoid colon polyp (biopsy):  - Tubular adenoma  - No high grade dysplasia       Electronically signed by Cristina Richmond MD on 11/4/2016 at 2:40 PM   LAB AP SURGICAL ADDITIONAL INFORMATION (Report)     These tests were developed and their performance characteristics   determined by [facility] or [facility]  They may not be cleared or approved by the [place]  Food and   Drug Administration  The FDA has determined that such clearance or   approval is not necessary  These tests are used for clinical purposes  They should not be regarded as investigational or for research   This   laboratory has been approved by [facility], designated as a high-complexity   laboratory and is qualified to perform these tests    LAB AP GROSS DESCRIPTION (Report)     A  The specimen is received in formalin, labeled with the patient's name   and medical record number, and is designated terminal ileum rule out   celiac  The specimen consists of 2 tan soft tissue fragments measuring   0 3 and 0 4 cm  Entirely submitted  One cassette  B  The specimen is received in formalin, labeled with the patient's name   and medical record number, and is designated random colon biopsy  The   specimen consists of multiple tan soft tissue fragments measuring in   aggregate 0 6 x 0 6 x 0 1 cm  Entirely submitted  One cassette  C  The specimen is received in formalin, labeled with the patient's name   and medical record number, and is designated transverse colon polyp   biopsy  The specimen consists of a single tan soft tissue fragment   measuring 0 4 cm  Entirely submitted  One cassette  D  The specimen is received in formalin, labeled with the patient's name   and medical record number, and is designated sigmoid colon polyp   biopsy  The specimen consists of a single tan soft tissue fragment   measuring 0 3 cm  Entirely submitted  One cassette  Note: The estimated total formalin fixation time based upon information   provided by the submitting clinician and the standard processing schedule   is 18 0 hours  MAC   LAB AP CLINICAL INFORMATION (Report)     R/o celiac    Colonoscopy: Two flat polyps, measuring less than 5 mm in size, were found   in the transverse colon and sigmoid colon  All polyps were completely   removed by cold biopsy polypectomy  The polyps were retrieved  There was   evidence of mild diverticulosis in the sigmoid colon  Otherwise, the colon   appeared to be normal  Multiple random cold forceps biopsies was taken   from the terminal ileum, ascending colon, transverse colon, descending   colon, and sigmoid colon     R/o celiac

## 2018-01-22 VITALS
HEART RATE: 76 BPM | HEIGHT: 69 IN | DIASTOLIC BLOOD PRESSURE: 62 MMHG | SYSTOLIC BLOOD PRESSURE: 110 MMHG | TEMPERATURE: 98.5 F | RESPIRATION RATE: 16 BRPM

## 2018-06-21 PROBLEM — R77.8 ELEVATED TROPONIN: Status: ACTIVE | Noted: 2018-01-01

## 2018-06-21 PROBLEM — L03.119 CELLULITIS OF LOWER EXTREMITY: Status: ACTIVE | Noted: 2018-01-01

## 2018-06-21 PROBLEM — I21.A1 TYPE 2 MYOCARDIAL INFARCTION (HCC): Status: ACTIVE | Noted: 2018-01-01

## 2018-06-21 PROBLEM — J18.9 PNEUMONIA OF BOTH LOWER LOBES DUE TO INFECTIOUS ORGANISM: Status: ACTIVE | Noted: 2018-01-01

## 2018-06-21 PROBLEM — I87.2 CHRONIC VENOUS INSUFFICIENCY: Status: ACTIVE | Noted: 2018-01-01

## 2018-06-21 NOTE — PLAN OF CARE
CARDIOVASCULAR - ADULT     Maintains optimal cardiac output and hemodynamic stability Progressing     Absence of cardiac dysrhythmias or at baseline rhythm Progressing        METABOLIC, FLUID AND ELECTROLYTES - ADULT     Electrolytes maintained within normal limits Progressing     Fluid balance maintained Progressing        PAIN - ADULT     Verbalizes/displays adequate comfort level or baseline comfort level Progressing        Potential for Falls     Patient will remain free of falls Progressing        RESPIRATORY - ADULT     Achieves optimal ventilation and oxygenation Progressing        SAFETY ADULT     Patient will remain free of falls Progressing

## 2018-06-21 NOTE — ED NOTES
Patient transported to CT alert and in stable condition  Vitals WNL  02 in place via [place] @ 3L/min        Kalen Garcia RN  06/21/18 2349

## 2018-06-21 NOTE — CASE MANAGEMENT
Initial Clinical Review    Admission: Date/Time/Statement: 6/21/18 @ 0547     Orders Placed This Encounter   Procedures    Inpatient Admission (expected length of stay for this patient is greater than two midnights)     Telemetry     Standing Status:   Standing     Number of Occurrences:   1     Order Specific Question:   Admitting Physician     Answer:   Tyrone Tran     Order Specific Question:   Level of Care     Answer:   Med Surg [16]     Order Specific Question:   Bed request comments     Answer:   telemetry; droplet precautions     Order Specific Question:   Estimated length of stay     Answer:   More than 2 Midnights     Order Specific Question:   Certification     Answer:   I certify that inpatient services are medically necessary for this patient for a duration of greater than two midnights  See H&P and MD Progress Notes for additional information about the patient's course of treatment  ED: Date/Time/Mode of Arrival:   ED Arrival Information     Expected Arrival Acuity Means of Arrival Escorted By Service Admission Type    - 6/21/2018 02:19 Urgent Ambulance [facility] General Medicine Urgent    Arrival Complaint    Respiratory distress          Chief Complaint:   Chief Complaint   Patient presents with    Shortness of Breath     Patient brought in by EMS for sob that has been ongoing on and off x 1 week  Patient woke up out of sleep tonight with sob  EMS also states that pt has some kind of "cellulitis on his lower extremities " Patient spo2 was in 70's at scene and was using accessory muscles to breathe  History of Illness: Sunny Boland is a 82 y o  male, PMHx of CHF, A fib, HTN, chronic venous stasis, prior CVA, who presents with SOB x2 weeks, worsened last night  Patient reports that he started having SOB with activity approximately 2 weeks ago  When he would get up to walk to the bathroom or would be getting in and out of bed he felt very winded   However, last night his breathing was much worse  He also notes a non-productive cough  He denies fever, chills, diaphoresis, CP, palpitations  He is unsure of any change in weight   Patient's aid at bedside reports that his leg swelling is at his baseline and his leg redness is actually improved since he has been wearing compression stockings and elevating his legs more        ED Vital Signs:   ED Triage Vitals   Temperature Pulse Respirations Blood Pressure SpO2   06/21/18 0225 06/21/18 0222 06/21/18 0222 06/21/18 0222 06/21/18 0222   (!) 97 4 °F (36 3 °C) 96 (!) 24 144/70 98 %      Temp Source Heart Rate Source Patient Position - Orthostatic VS BP Location FiO2 (%)   06/21/18 0225 06/21/18 0222 06/21/18 0222 06/21/18 0222 --   Oral Monitor Lying Right arm       Pain Score       06/21/18 0222       No Pain        Wt Readings from Last 1 Encounters:   06/21/18 79 9 kg (176 lb 2 4 oz)       Vital Signs (abnormal):    above    Abnormal Labs/Diagnostic Test Results:    BUN  30  Albumin   3 3  BNP    6,165  Troponin  0 11  D dimer  1,490  Ct  Chest:    Moderate right and small-to-moderate left pleural effusions with bilateral basilar consolidations; superimposed infection must be excluded clinically     CXR;  CM   r  >  L   Pleural effusion    ED Treatment:   Medication Administration from 06/21/2018 0219 to 06/21/2018 3063       Date/Time Order Dose Route Action Action by Comments     06/21/2018 0225  EMS REPLENISHMENT MED 0  Does not apply Given to EMS Charlene Turcios RN Given to [facility] - [facility]     06/21/2018 0256 albuterol inhalation solution 5 mg 5 mg Nebulization Given Charlene Turcios RN      06/21/2018 0423 aspirin chewable tablet 162 mg 162 mg Oral Given Charlene Turcios RN      06/21/2018 0448 iohexol (OMNIPAQUE) 350 MG/ML injection (MULTI-DOSE) 85 mL 85 mL Intravenous Given Deanne Monteiro      06/21/2018 0610 ceftriaxone (ROCEPHIN) 1 g/50 mL in dextrose IVPB 1,000 mg Intravenous David Witt Haley, RN      06/21/2018 0610 azithromycin (ZITHROMAX) tablet 500 mg 500 mg Oral Given Verlan Cassette, RN      06/21/2018 0555 tetanus-diphtheria-acellular pertussis (BOOSTRIX) IM injection 0 5 mL 0 5 mL Intramuscular Given Verlan Cassette, RN      06/21/2018 0556 nitroglycerin (NITRO-BID) 2 % TD ointment 0 5 inch 0 5 inch Topical Given Verlan Cassette, RN      06/21/2018 0556 furosemide (LASIX) injection 20 mg 20 mg Intravenous Given Verlan Cassette, RN           Past Medical/Surgical History: Active Ambulatory Problems     Diagnosis Date Noted    Cardiomyopathy [facility])      Resolved Ambulatory Problems     Diagnosis Date Noted    No Resolved Ambulatory Problems     Past Medical History:   Diagnosis Date    [facility]omyopathy ([facility] )     Cellulitis of lower extremity 6/21/2018    Cervical spinal stenosis     Cervical spondylosis     [facility]congestive heart failure) ([facility]     Chronic venous stasis dermatitis of both lower extremities     DDD (degenerative disc disease), lumbar     Degenerative cervical disc     Discitis of lumbar region     Elevated troponin 6/21/2018    History of BPH     Hypertension     Lumbar canal stenosis     OA (osteoarthritis)     Osteomyelitis ([facility]     Paroxysmal A-fib ([facility])     Pneumonia of both lower lobes due to infectious organism ([place] [address] ) 6/21/2018    Spinal stenosis     Urinary retention        Admitting Diagnosis: Atrial fibrillation ([facility]) [I48 91]  Shortness of breath [R06 02]  CHF (congestive heart failure) (HCC) [I50 9]  Pneumonia [J18 9]  Elevated troponin [R74 8]  Raised TSH level [R94 6]  Cellulitis of both lower extremities [L03 115, L03 116]  Acute congestive heart failure, unspecified heart failure type ([place] [address] ) [I50 9]    Age/Sex: 82 y o  male    Assessment/Plan:   Acute congestive heart failure (HCC)   Assessment & Plan     · Presented with worsening cough, SOB x2 weeks  · CXR- Pulmonary vascular congestion  · D-Dimer 1490    · CTA Chest-  Moderate right and small-to-moderate left pleural effusions with bilateral basilar consolidations; superimposed infection must be excluded clinically  · BNP 6165  · Echo 12/23/15 at [facility]- LVEF 40%  ? Repeat Echo  · Daily weights, I&Os  · Fluid restriction  · Lasix  · Consult cardiology           Elevated troponin   Assessment & Plan     · Troponin 0 11   · EKG- Atrial fibrillation, rate 105  · Likely NSTEMI type II secondary to CHF  · Trend troponin  · Telemetry  · Appreciate cardiology input  Pneumonia of both lower lobes due to infectious organism [facility])   Assessment & Plan     · Bilateral basilar consolidation seen on CTA Chest   · Afebrile without leukocytosis  · Respiratory protocol  · Rocephin, Azithromycin  · Urine strep/legionella  · Sputum culture  · Blood cultures pending           Paroxysmal A-fib (HCC)   Assessment & Plan     · Rate 98  · Continue PO Cardizem         Hypertension   Assessment & Plan     · /80  · Continue Lisinopril           Chronic venous insufficiency   Assessment & Plan     · Patient's aid reports that his LE erythema is much improved and his edema is at baseline  · Elevated extremities  · GREG stockings  Anticipated Length of Stay:  Patient will be admitted on an Inpatient basis with an anticipated length of stay of  Greater than 2 midnights     Justification for Hospital Stay: Acute on Chronic CHF, PNA          Admission Orders:   IP     6/21    @   0547  Scheduled Meds:   Current Facility-Administered Medications:  acetaminophen 650 mg Oral Q6H PRN Bonifacio Wang PA-C   [START ON 6/22/2018] aspirin 81 mg Oral Daily Bonifacio Wang PA-C   enoxaparin 40 mg Subcutaneous Daily Bonifacio Wang PA-C   finasteride 5 mg Oral Daily Bonifacio Wang PA-C   furosemide 40 mg Intravenous BID Bonifacio Wang PA-C   lisinopril 2 5 mg Oral Daily Bonifacio Wang PA-C   metoprolol tartrate 25 mg Oral Q12H [address] ELEN Schwartz   nitroglycerin 0 4 mg Sublingual Q5 Min PRN Nessa Carroll PA-C   ondansetron 4 mg Intravenous Q6H PRN Nessa Carroll PA-C     Continuous Infusions:    PRN Meds:   acetaminophen    nitroglycerin    ondansetron     Tele  Cardiac  Diet  FR  CHF  Teaching  Aspiration precautions  Cons cardiology  Sputum c/s  Serial troponin  2 DE    Thank you,  [facility] in the [facility] by Rohith Randall for 2017  Network Utilization Review Department  Phone: [phone]; Fax [phone]  ATTENTION: The Network Utilization Review Department is now centralized for our 7 Facilities  Make a note that we have a new phone and fax numbers for our Department  Please call with any questions or concerns to [phone] and carefully follow the prompts so that you are directed to the right person  All voicemails are confidential  Fax any determinations, approvals, denials, and requests for initial or continue stay review clinical to [phone]  Due to HIGH CALL volume, it would be easier if you could please send faxed requests to expedite your requests and in part, help us provide discharge notifications faster

## 2018-06-21 NOTE — RESPIRATORY THERAPY NOTE
RT Protocol Note  Shelby Pipe 82 y o  male MRN: [id number]  Unit/Bed#: -01 Encounter: [id number]    Assessment    Principal Problem:    Acute congestive heart failure ([facility] )  Active Problems:    [facility] ([facility] )    Hypertension    Pneumonia of both lower lobes due to infectious organism [facility])    Chronic venous insufficiency    Elevated troponin      Home Pulmonary Medications:  NONE       Past Medical History:   Diagnosis Date    Cardiomyopathy ([facility] )     with EF of 40 % 12/15    Cellulitis of lower extremity 6/21/2018    Cervical spinal stenosis     Cervical spondylosis     [facility]     Chronic venous stasis dermatitis of both lower extremities     DDD (degenerative disc disease), lumbar     Degenerative cervical disc     Discitis of lumbar region     Elevated troponin 6/21/2018    History of BPH     Hypertension     Lumbar canal stenosis     OA (osteoarthritis)     [facility]     [facility]ox[facility]mal [facility]fib ([facility])     Pneumonia of both lower lobes due to infectious organism ([facility] ) 6/21/2018    Spinal stenosis     Urinary retention      Social History     Social History    Marital status: Single     Spouse name: N/A    Number of children: N/A    Years of education: N/A     Social History Main Topics    Smoking status: Former Smoker    Smokeless tobacco: Never Used    Alcohol use No    Drug use: No    Sexual activity: Not Asked     Other Topics Concern    None     Social History Narrative    None       Subjective         Objective    Physical Exam:   Assessment Type: Assess only  General Appearance: Alert, Awake  Respiratory Pattern: Normal, Symmetrical, Spontaneous  Chest Assessment: Chest expansion symmetrical  Bilateral Breath Sounds: Diminished, Expiratory wheezes    Vitals:  Blood pressure 145/81, pulse 98, temperature 97 6 °F (36 4 °C), temperature source Oral, resp   rate 20, height 5' 10" (1 778 m), weight 79 9 kg (176 lb 2 4 oz), SpO2 100 %       Results from last 7 days  Lab Units 06/21/18  0259   PH ART  7 402   PCO2 ART mm Hg 47 0*   PO2 ART mm Hg 73 6*   HCO3 ART mmol/L 28 6*   BASE EXC ART mmol/L 3 1   O2 CONTENT ART mL/dL 17 2   O2 HGB, ARTERIAL % 93 2*   ABG SOURCE  Radial, Left   SHAYLA TEST  Yes   NON VENT ROOM AIR % 21       Imaging and other studies: I have personally reviewed pertinent reports  Plan    Respiratory Plan: Discontinue Protocol, No distress/Pulmonary history        Resp Comments: Patient assessed per respiratory protocol  Patient has a history of CHF  Patient does not have a history of pulmonary issues, nor does the patient use pulmonary machines at home  Patient does have slight wheezing due to CHF  I will be discontinuing respiratory protocol

## 2018-06-21 NOTE — ASSESSMENT & PLAN NOTE
· Bilateral basilar consolidation seen on CTA Chest   · Afebrile without leukocytosis  · Respiratory protocol  · Rocephin, Azithromycin  · Urine strep/legionella  · Sputum culture  · Blood cultures pending

## 2018-06-21 NOTE — ED PROVIDER NOTES
History  Chief Complaint   Patient presents with    Shortness of Breath     Patient brought in by EMS for sob that has been ongoing on and off x 1 week  Patient woke up out of sleep tonight with sob  EMS also states that pt has some kind of "cellulitis on his lower extremities " Patient spo2 was in 70's at scene and was using accessory muscles to breathe  Patient is a 82year old male with worsening shortness of breath and cough for past 2 weeks  No fever  No travel  Denies smoking  No chest pain  No recent old records from this ED seen on computer system  Is not on home oxygen  [place] [facility] [place] [facility] website checked on this patient and patient not found  (+) redness of both legs as well  ?last Td  Patient has a h/o CHF  History provided by:  Patient and caregiver   used: No    Shortness of Breath   Associated symptoms: cough    Associated symptoms: no chest pain, no fever and no vomiting        Prior to Admission Medications   Prescriptions Last Dose Informant Patient Reported?  Taking?   bifidobacterium infantis (ALIGN) capsule   Yes Yes   Sig: Take 1 capsule by mouth daily   diltiazem (CARDIZEM) 90 mg tablet   Yes Yes   Sig: Take 90 mg by mouth daily     finasteride (PROSCAR) 5 mg tablet   Yes Yes   Sig: Take 5 mg by mouth daily   lisinopril (ZESTRIL) 2 5 mg tablet   Yes Yes   Sig: Take 2 5 mg by mouth daily      Facility-Administered Medications: None       Past Medical History:   Diagnosis Date    Cardiomyopathy ([facility] [address] )     with EF of 40 % 12/15    Cervical spinal stenosis     Cervical spondylosis     CHF (congestive heart failure) (HCC)     Chronic venous stasis dermatitis of both lower extremities     DDD (degenerative disc disease), lumbar     Degenerative cervical disc     Discitis of lumbar region     History of BPH     Hypertension     Lumbar canal stenosis     OA (osteoarthritis)     Osteomyelitis (HCC)     Paroxysmal A-fib (HCC)     Spinal stenosis     Urinary retention        Past Surgical History:   Procedure Laterality Date    BACK SURGERY      FOOT SURGERY      HIP SURGERY      WI COLONOSCOPY FLX DX W/COLLJ SPEC WHEN PFRMD N/A 11/3/2016    Procedure: COLONOSCOPY;  Surgeon: Charles Metz MD;  Location: [facility]; Service: Gastroenterology    ROTATOR CUFF REPAIR         History reviewed  No pertinent family history  I have reviewed and agree with the history as documented  Social History   Substance Use Topics    Smoking status: Former Smoker    Smokeless tobacco: Never Used    Alcohol use No        Review of Systems   Constitutional: Negative for fever  Respiratory: Positive for cough and shortness of breath  Cardiovascular: Negative for chest pain  Gastrointestinal: Negative for nausea and vomiting  Skin: Positive for color change (redness of both legs)  All other systems reviewed and are negative  Physical Exam  Physical Exam   Constitutional: He is oriented to person, place, and time  He appears distressed (moderate)  HENT:   Head: Normocephalic and atraumatic  Mouth/Throat: Oropharynx is clear and moist    Eyes: No scleral icterus  Neck: Normal range of motion  Neck supple  No JVD present  Cardiovascular: Normal rate and normal heart sounds  No murmur heard  Irregularly irregular   Pulmonary/Chest: No stridor  He is in respiratory distress  He has wheezes  He has no rales  Diminished breath sounds bilaterally   Abdominal: Soft  Bowel sounds are normal  There is no tenderness  Musculoskeletal: He exhibits edema (bilateral LE edema)  He exhibits no tenderness (No calf tenderness)  Neurological: He is alert and oriented to person, place, and time  Skin: Skin is warm and dry  No rash noted  There is erythema (bilateral LE erythema)  Psychiatric: He has a normal mood and affect  Nursing note and vitals reviewed        Vital Signs  ED Triage Vitals   Temperature Pulse Respirations Blood Pressure SpO2   06/21/18 0225 06/21/18 0222 06/21/18 0222 06/21/18 0222 06/21/18 0222   (!) 97 4 °F (36 3 °C) 96 (!) 24 144/70 98 %      Temp Source Heart Rate Source Patient Position - Orthostatic VS BP Location FiO2 (%)   06/21/18 0225 06/21/18 0222 06/21/18 0222 06/21/18 0222 --   Oral Monitor Lying Right arm       Pain Score       06/21/18 0222       No Pain           Vitals:    06/21/18 0222 06/21/18 0403 06/21/18 0430   BP: 144/70 130/75 123/80   Pulse: 96 97 98   Patient Position - Orthostatic VS: Lying Sitting Sitting       Visual Acuity      ED Medications  Medications   ceftriaxone (ROCEPHIN) 1 g/50 mL in dextrose IVPB (not administered)   azithromycin (ZITHROMAX) tablet 500 mg (not administered)   vancomycin (VANCOCIN) IVPB (premix) 1,000 mg (not administered)   tetanus-diphtheria-acellular pertussis (BOOSTRIX) IM injection 0 5 mL (not administered)   nitroglycerin (NITRO-BID) 2 % TD ointment 0 5 inch (not administered)   furosemide (LASIX) injection 20 mg (not administered)    EMS REPLENISHMENT MED ( Does not apply Given to EMS 6/21/18 0225)   albuterol inhalation solution 5 mg (5 mg Nebulization Given 6/21/18 0256)   aspirin chewable tablet 162 mg (162 mg Oral Given 6/21/18 0423)   iohexol (OMNIPAQUE) 350 MG/ML injection (MULTI-DOSE) 85 mL (85 mL Intravenous Given 6/21/18 0448)       Diagnostic Studies  Results Reviewed     Procedure Component Value Units Date/Time    Comprehensive metabolic panel [[id number]]  (Abnormal) Collected:  06/21/18 0243    Lab Status:  Final result Specimen:  Blood from Arm, Right Updated:  06/21/18 0318     Sodium 142 mmol/L      Potassium 4 1 mmol/L      Chloride 104 mmol/L      CO2 31 mmol/L      Anion Gap 7 mmol/L      BUN 30 (H) mg/dL      Creatinine 1 05 mg/dL      Glucose 102 mg/dL      Calcium 8 9 mg/dL      AST 37 U/L      ALT 37 U/L      Alkaline Phosphatase 99 U/L      Total Protein 7 3 g/dL      Albumin 3 3 (L) g/dL      Total Bilirubin 0 50 mg/dL      eGFR 66 ml/min/1 73sq m     Narrative: National Kidney Disease Education Program recommendations are as follows:  GFR calculation is accurate only with a steady state creatinine  Chronic Kidney disease less than 60 ml/min/1 73 sq  meters  Kidney failure less than 15 ml/min/1 73 sq  meters  B-type natriuretic peptide [[id number]]  (Abnormal) Collected:  06/21/18 0243    Lab Status:  Final result Specimen:  Blood from Arm, Right Updated:  06/21/18 0318     NT-proBNP 6,165 (H) pg/mL     TSH [[id number]]  (Abnormal) Collected:  06/21/18 0242    Lab Status:  Final result Specimen:  Blood from Arm, Right Updated:  06/21/18 0318     TSH 3RD GENERATON 4 494 (H) uIU/mL     Narrative:         Patients undergoing fluorescein dye angiography may retain small amounts of fluorescein in the body for 48-72 hours post procedure  Samples containing fluorescein can produce falsely depressed TSH values  If the patient had this procedure,a specimen should be resubmitted post fluorescein clearance  Blood culture #2 [[id number]] Collected:  06/21/18 0308    Lab Status: In process Specimen:  Blood from Arm, Left Updated:  06/21/18 0314    Lactic acid, plasma [[id number]]  (Normal) Collected:  06/21/18 0243    Lab Status:  Final result Specimen:  Blood from Arm, Right Updated:  06/21/18 4128     LACTIC ACID 1 8 mmol/L     Narrative:         Result may be elevated if tourniquet was used during collection      Troponin I [[id number]]  (Abnormal) Collected:  06/21/18 0243    Lab Status:  Final result Specimen:  Blood from Arm, Right Updated:  06/21/18 0312     Troponin I 0 11 (H) ng/mL     D-Dimer [[id number]]  (Abnormal) Collected:  06/21/18 0243    Lab Status:  Final result Specimen:  Blood from Arm, Right Updated:  06/21/18 0310     D-Dimer, Quant 1,490 (H) ng/ml (FEU)     Blood gas, arterial [[id number]]  (Abnormal) Collected:  06/21/18 0259    Lab Status:  Final result Specimen:  Blood, Arterial from Radial, Left Updated:  06/21/18 0307     pH, Arterial 7 402     pCO2, Arterial 47 0 (H) mm Hg      pO2, Arterial 73 6 (L) mm Hg      HCO3, Arterial 28 6 (H) mmol/L      Base Excess, Arterial 3 1 mmol/L      O2 Content, Arterial 17 2 mL/dL      O2 HGB,Arterial  93 2 (L) %      SOURCE Radial, Left     SHAYLA TEST Yes     ROOM AIR FIO2 21 %     Protime-INR [[id number]]  (Normal) Collected:  06/21/18 0243    Lab Status:  Final result Specimen:  Blood from Arm, Right Updated:  06/21/18 0304     Protime 13 7 seconds      INR 1 08    APTT [[id number]]  (Normal) Collected:  06/21/18 0243    Lab Status:  Final result Specimen:  Blood from Arm, Right Updated:  06/21/18 0304     PTT 30 seconds     CBC and differential [[id number]]  (Abnormal) Collected:  06/21/18 0242    Lab Status:  Final result Specimen:  Blood from Arm, Right Updated:  06/21/18 0259     WBC 5 17 Thousand/uL      RBC 4 32 Million/uL      Hemoglobin 12 7 g/dL      Hematocrit 41 4 %      MCV 96 fL      MCH 29 4 pg      MCHC 30 7 (L) g/dL      RDW 15 4 (H) %      MPV 13 2 (H) fL      Platelets 010 (L) Thousands/uL      Neutrophils Relative 63 %      Lymphocytes Relative 27 %      Monocytes Relative 10 %      Eosinophils Relative 1 %      Basophils Relative 0 %      Neutrophils Absolute 3 26 Thousands/µL      Lymphocytes Absolute 1 37 Thousands/µL      Monocytes Absolute 0 49 Thousand/µL      Eosinophils Absolute 0 03 Thousand/µL      Basophils Absolute 0 02 Thousands/µL     Blood culture #1 [[id number]]     Lab Status:  No result Specimen:  Blood                  CTA ED chest PE study   ED Interpretation by Paola Koroma MD (06/21 0530)   FINDINGS:      PULMONARY ARTERIAL TREE:  No pulmonary embolus is seen  LUNGS and pleura: Moderate right and small-to-moderate left pleural effusions with bilateral basilar consolidations; superimposed infection must be excluded clinically   There is no tracheal or endobronchial lesion  HEART/AORTA:  Unremarkable for patient's age  MEDIASTINUM AND TRINITY:  Unremarkable        CHEST WALL AND LOWER NECK:   Unremarkable  VISUALIZED STRUCTURES IN THE UPPER ABDOMEN:  Few simple cysts in the liver   Bilateral adrenal gland thickening   YEAHHBN  OSSEOUS STRUCTURES:  No acute fracture or destructive osseous lesion   T10-T11 and L2 transpedicular instrumentation are noted  Impression:        Moderate right and small-to-moderate left pleural effusions with bilateral basilar consolidations; superimposed infection must be excluded clinically                     Workstation performed: [id number]         Final Result by Adalid Mendez MD (06/21 5799)      Moderate right and small-to-moderate left pleural effusions with bilateral basilar consolidations; superimposed infection must be excluded clinically  Workstation performed: [id number]         XR chest 1 view portable   ED Interpretation by Israel Polk MD (06/21 1310)   Cardiomegaly, increased interstitial markings read by me  Procedures  ECG 12 Lead Documentation  Date/Time: 6/21/2018 2:33 AM  Performed by: Piyush Lora  Authorized by: Piyush Lora     Indications / Diagnosis:  Sob  ECG reviewed by me, the ED Provider: yes    Patient location:  ED  Previous ECG:     Previous ECG:  Compared to current    Comparison ECG info:  2/18/11    Similarity:  Changes noted (tachy now)  Rate:     ECG rate:  105    ECG rate assessment: tachycardic    Rhythm:     Rhythm: atrial fibrillation    Ectopy:     Ectopy: none    QRS:     QRS axis:  Left  ST segments:     ST segments:  Normal  T waves:     T waves: normal    Comments:      I do not agree with computer reading of consuelo [place] with 1st degree AVB  Phone Contacts  ED Phone Contact    ED Course  ED Course as of Jun 21 0547   Thu Jun 21, 2018   0240 EKG d/w patient and no known h/o a  Fib      0419 Labs and CXR d/w patient and caretaker  CT PE study ordered for elevated D-dimer                                   MDM  Number of Diagnoses or Management Options  Diagnosis management comments: DDX including but not limited to: pneumonia, pleural effusion, CHF, PE, PTX, ACS, MI, asthma exacerbation, COPD exacerbation, anemia, metabolic abnormality, renal failure, atrial fibrillation, thyroid disease  Amount and/or Complexity of Data Reviewed  Clinical lab tests: ordered and reviewed  Tests in the radiology section of CPT®: reviewed and ordered  Decide to obtain previous medical records or to obtain history from someone other than the patient: yes  Obtain history from someone other than the patient: yes  Independent visualization of images, tracings, or specimens: yes      CritCare Time    Disposition  Final diagnoses:   CHF (congestive heart failure) ([facility])   Pneumonia   Elevated troponin   Raised TSH level   Cellulitis of both lower extremities   Atrial fibrillation ([facility] [address] )     Time reflects when diagnosis was documented in both MDM as applicable and the Disposition within this note     Time User Action Codes Description Comment    6/21/2018  5:46 AM Lexington Ruddle Add [I50 9] CHF (congestive heart failure) ([facility] [address] )     6/21/2018  5:46 AM Danny Ruddle Add [J18 9] Pneumonia     6/21/2018  5:46 AM Danny Ruddle Add [R74 8] Elevated troponin     6/21/2018  5:46 AM [place] Ruddle Add [R94 6] Raised TSH level     6/21/2018  5:46 AM Danny Ruddle Add [L03 115,  L03 116] Cellulitis of both lower extremities     6/21/2018  5:47 AM [place] Ruddle Add [I48 91] Atrial fibrillation [facility])       ED Disposition     ED Disposition Condition Comment    Admit  Case was discussed with MAYELA Nina and the patient's admission status was agreed to be Admission Status: inpatient status to the service of Dr Jacki Mtz   Follow-up Information    None         Patient's Medications   Discharge Prescriptions    No medications on file     No discharge procedures on file      ED Provider  Electronically Signed by           Brittaney Brower MD  06/21/18 8906

## 2018-06-21 NOTE — PROGRESS NOTES
Consultation - Cardiology   Vinnie Rizvi 82 y o  male MRN: [id number]  Unit/Bed#: -01 Encounter: [id number]    Assessment/Plan     Principal Problem:    Acute congestive heart failure ([facility] [address] )  Active Problems:    Paroxysmal A-fib ([facility] [address] )    Hypertension    Pneumonia of both lower lobes due to infectious organism [facility])    Chronic venous insufficiency    Elevated troponin      Assessment/Plan    1  Acute hypoxic respiratory insufficiency- secondary to #2 #5    2  Acute on chronic systolic heart failure  Bilateral pleural effusions  Echo EF - 40%  Echo pending  Off BB and oral lasix since last cardiology visit 2015  IV diuretics- 40mg BID  Should be on BB unless contraindications  3   Elevated D-dimer but CTA chest reveals no pulmonary embolism    4  Elevated troponin- likely NSTEMI II secondary to #1  F/U with current echo  5   Bibasilar consolidation- SLIM has initiated antibiotics    6  HTN- -150  On low dose ace/ cardizem  7   Acute on chronic lower extremity edema/ erythema  Patient with chronic venous insufficiency- more acute likely from volume overload  ? Cellulitis as well    8  PAF- not on oral AC at home  Cardiology records do not indicate reason  Currently NSR  On cardizem 90mg daily  I would change to BB  History of Present Illness   Physician Requesting Consult: Payton Day MD  Reason for Consult / Principal Problem:, congestive heart failure Elevated troponin    HPI: Vinnei Rizvi is a 82y o  year old male with cardiomyopathy, venous stasis, hypertension, PAF  ( [facility] who presents with worsening shortness of breath over past 3 weeks  He apparently has history of NICMP with subsequent normalization of LVEF in the past  He more recently was seen by [address] 2015 and EF at that time 40%  He was on BB/ ace inhibitor and diuretic at that time  He hasn't had f/u since then  He has worsened lower extremity edema   He is cared for by provider at [facility]  He said for unclear reasons many of his medications were discontinued 4 months ago including his Lasix  He has a 24 hour caretaker that lives with him  Denies any chest discomfort  Patient has previous PND/orthopnea he however was acutely short of breath on presentation  He reports that he woke from sleep short of breath  Pulse ox reportedly His mobility 70% at seen  His activity is quite limited  He was out of the house he is mainly in a wheelchair  He is able to walk from room to room in the house with a walker  This is been difficult recently due to a shortness of breath  He has a dry cough which is fairly normal for him  He has no fever or chills  Cardiology was consulted regarding congestive heart failure and elevated troponin  CTA chest reveals moderate right and small to moderate left pleural effusions with bilateral basilar consolidations  Superimposed infection cannot be excluded  No pulmonary embolism seen  Troponin 0 11  ProBNP 6, 165      Inpatient consult to Cardiology  Consult performed by: Yasmani Jorgensen ordered by: Maribel Cortez          Review of Systems    Historical Information   Past Medical History:   Diagnosis Date    Cardiomyopathy [facility])     with EF of 40 % 12/15    Cellulitis of lower extremity 6/21/2018    Cervical spinal stenosis     Cervical spondylosis     [facility]congestive heart failure) ([facility])     Chronic venous stasis dermatitis of both lower extremities     DDD (degenerative disc disease), lumbar     Degenerative cervical disc     Discitis of lumbar region     Elevated troponin 6/21/2018    History of BPH     Hypertension     Lumbar canal stenosis     OA (osteoarthritis)     [facility]omyelitis ([facility])     Paroxysmal A-fib (HCC)     Pneumonia of both lower lobes due to infectious organism ([facility] [address] ) 6/21/2018    Spinal stenosis     Urinary retention      Past Surgical History:   Procedure Laterality Date    BACK SURGERY      FOOT SURGERY      HIP SURGERY      [place] COLONOSCOPY FLX DX W/COLLJ SPEC WHEN PFRMD N/A 11/3/2016    Procedure: COLONOSCOPY;  Surgeon: Prosper Pinedo MD;  [facility]; Service: Gastroenterology    ROTATOR CUFF REPAIR       History   Alcohol Use No     History   Drug Use No     History   Smoking Status    Former Smoker   Smokeless Tobacco    Never Used     Family History: History reviewed  No pertinent family history  Meds/Allergies   current meds:   Current Facility-Administered Medications   Medication Dose Route Frequency    acetaminophen (TYLENOL) tablet 650 mg  650 mg Oral Q6H PRN    [START ON 6/22/2018] aspirin chewable tablet 81 mg  81 mg Oral Daily    [START ON 6/22/2018] cefTRIAXone (ROCEPHIN) 1,000 mg in dextrose 5 % 50 mL IVPB  1,000 mg Intravenous Q24H    And    [START ON 6/22/2018] azithromycin (ZITHROMAX) 500 mg in sodium chloride 0 9% 250mL IVPB 500 mg  500 mg Intravenous Q24H    diltiazem (CARDIZEM) tablet 90 mg  90 mg Oral Daily    enoxaparin (LOVENOX) subcutaneous injection 40 mg  40 mg Subcutaneous Daily    finasteride (PROSCAR) tablet 5 mg  5 mg Oral Daily    furosemide (LASIX) injection 40 mg  40 mg Intravenous BID    lisinopril (ZESTRIL) tablet 2 5 mg  2 5 mg Oral Daily    nitroglycerin (NITROSTAT) SL tablet 0 4 mg  0 4 mg Sublingual Q5 Min PRN    ondansetron (ZOFRAN) injection 4 mg  4 mg Intravenous Q6H PRN    and PTA meds:  Prescriptions Prior to Admission   Medication    bifidobacterium infantis (ALIGN) capsule    diltiazem (CARDIZEM) 90 mg tablet    finasteride (PROSCAR) 5 mg tablet    lisinopril (ZESTRIL) 2 5 mg tablet     Allergies   Allergen Reactions    Morphine        Objective   Vitals: Blood pressure 145/81, pulse 97, temperature 97 6 °F (36 4 °C), temperature source Oral, resp  rate 20, height 5' 10" (1 778 m), weight 79 9 kg (176 lb 2 4 oz), SpO2 100 %    Orthostatic Blood Pressures      Most Recent Value   Blood Pressure  145/81 filed at 06/21/2018 0730 Patient Position - Orthostatic VS  Lying filed at 06/21/2018 0730            Intake/Output Summary (Last 24 hours) at 06/21/18 7363  Last data filed at 06/21/18 0910   Gross per 24 hour   Intake                0 ml   Output              700 ml   Net             -700 ml       Invasive Devices     Peripheral Intravenous Line            Peripheral IV 06/21/18 Left Forearm less than 1 day    Peripheral IV 06/21/18 Right Forearm less than 1 day                Physical Exam: /81 (BP Location: Left arm)   Pulse 97   Temp 97 6 °F (36 4 °C) (Oral)   Resp 20   Ht 5' 10" (1 778 m)   Wt 79 9 kg (176 lb 2 4 oz)   SpO2 100%   BMI 25 27 kg/m²   General Appearance:    Alert, cooperative, no distress, appears stated age   Head:    Normocephalic, no scleral icterus   Eyes:    PERRL   Nose:   Nares normal, septum midline, mucosa normal, no drainage    Throat:   Lips, mucosa, and tongue normal           Lungs:     Few crackles bases, decreased  to auscultation bilaterally, respirations unlabored        Heart:    Regular rate and rhythm, S1 and S2 normal, no murmur,   Abdomen:     Soft, non-tender, bowel sounds active all four quadrants,     no masses, no organomegaly   Extremities:   Extremities normal, atraumatic, no cyanosis , 1+ edema       Skin:   Skin - mild erythema/ redness right leg    Neurologic:   Alert and oriented to person place and time   No focal deficits       Lab Results:   Recent Results (from the past 72 hour(s))   CBC and differential    Collection Time: 06/21/18  2:42 AM   Result Value Ref Range    WBC 5 17 4 31 - 10 16 Thousand/uL    RBC 4 32 3 88 - 5 62 Million/uL    Hemoglobin 12 7 12 0 - 17 0 g/dL    Hematocrit 41 4 36 5 - 49 3 %    MCV 96 82 - 98 fL    MCH 29 4 26 8 - 34 3 pg    MCHC 30 7 (L) 31 4 - 37 4 g/dL    RDW 15 4 (H) 11 6 - 15 1 %    MPV 13 2 (H) 8 9 - 12 7 fL    Platelets 771 (L) 470 - 390 Thousands/uL    Neutrophils Relative 63 43 - 75 %    Lymphocytes Relative 27 14 - 44 %    Monocytes Relative 10 4 - 12 %    Eosinophils Relative 1 0 - 6 %    Basophils Relative 0 0 - 1 %    Neutrophils Absolute 3 26 1 85 - 7 62 Thousands/µL    Lymphocytes Absolute 1 37 0 60 - 4 47 Thousands/µL    Monocytes Absolute 0 49 0 17 - 1 22 Thousand/µL    Eosinophils Absolute 0 03 0 00 - 0 61 Thousand/µL    Basophils Absolute 0 02 0 00 - 0 10 Thousands/µL   TSH    Collection Time: 06/21/18  2:42 AM   Result Value Ref Range    TSH 3RD GENERATON 4 494 (H) 0 358 - 3 740 uIU/mL   Protime-INR    Collection Time: 06/21/18  2:43 AM   Result Value Ref Range    Protime 13 7 11 8 - 14 2 seconds    INR 1 08 0 86 - 1 17   APTT    Collection Time: 06/21/18  2:43 AM   Result Value Ref Range    PTT 30 24 - 36 seconds   Comprehensive metabolic panel    Collection Time: 06/21/18  2:43 AM   Result Value Ref Range    Sodium 142 136 - 145 mmol/L    Potassium 4 1 3 5 - 5 3 mmol/L    Chloride 104 100 - 108 mmol/L    CO2 31 21 - 32 mmol/L    Anion Gap 7 4 - 13 mmol/L    BUN 30 (H) 5 - 25 mg/dL    Creatinine 1 05 0 60 - 1 30 mg/dL    Glucose 102 65 - 140 mg/dL    Calcium 8 9 8 3 - 10 1 mg/dL    AST 37 5 - 45 U/L    ALT 37 12 - 78 U/L    Alkaline Phosphatase 99 46 - 116 U/L    Total Protein 7 3 6 4 - 8 2 g/dL    Albumin 3 3 (L) 3 5 - 5 0 g/dL    Total Bilirubin 0 50 0 20 - 1 00 mg/dL    eGFR 66 ml/min/1 73sq m   Lactic acid, plasma    Collection Time: 06/21/18  2:43 AM   Result Value Ref Range    LACTIC ACID 1 8 0 5 - 2 0 mmol/L   D-Dimer    Collection Time: 06/21/18  2:43 AM   Result Value Ref Range    D-Dimer, Quant 1,490 (H) 0 - 424 ng/ml (FEU)   Troponin I    Collection Time: 06/21/18  2:43 AM   Result Value Ref Range    Troponin I 0 11 (H) <=0 04 ng/mL   B-type natriuretic peptide    Collection Time: 06/21/18  2:43 AM   Result Value Ref Range    NT-proBNP 6,165 (H) <450 pg/mL   Blood gas, arterial    Collection Time: 06/21/18  2:59 AM   Result Value Ref Range    pH, Arterial 7 402 7 350 - 7 450    pCO2, Arterial 47 0 (H) 36 0 - 44 0 mm Hg pO2, Arterial 73 6 (L) 75 0 - 129 0 mm Hg    HCO3, Arterial 28 6 (H) 22 0 - 28 0 mmol/L    Base Excess, Arterial 3 1 mmol/L    O2 Content, Arterial 17 2 16 0 - 23 0 mL/dL    O2 HGB,Arterial  93 2 (L) 94 0 - 97 0 %    SOURCE Radial, Left     SHAYLA TEST Yes     ROOM AIR FIO2 21 %   Platelet count    Collection Time: 06/21/18  9:03 AM   Result Value Ref Range    Platelets 772 (L) 999 - 390 Thousands/uL    MPV 12 6 8 9 - 12 7 fL         Imaging: I have personally reviewed pertinent reports  EKG: sinus tach      Code Status: Level 1 - Full Code  Advance Directive and Living Will:      Power of :    POLST:      Counseling / Coordination of Care  Total floor / unit time spent today 45 minutes  Greater than 50% of total time was spent with the patient and / or family counseling and / or coordination of care

## 2018-06-21 NOTE — ASSESSMENT & PLAN NOTE
· Presented with worsening cough, SOB x2 weeks  · CXR- Pulmonary vascular congestion  · D-Dimer 1490  · CTA Chest-  Moderate right and small-to-moderate left pleural effusions with bilateral basilar consolidations; superimposed infection must be excluded clinically  · BNP 6165  · Echo 12/23/15 at [facility]- LVEF 40%  · Repeat Echo  · Daily weights, I&Os  · Fluid restriction  · Lasix  · Consult cardiology

## 2018-06-21 NOTE — ASSESSMENT & PLAN NOTE
· Patient's aid reports that his LE erythema is much improved and his edema is at baseline  · Elevated extremities  · GREG stockings

## 2018-06-21 NOTE — ASSESSMENT & PLAN NOTE
· Troponin 0 11   · EKG- Atrial fibrillation, rate 105  · Likely NSTEMI type II secondary to CHF  · Trend troponin  · Telemetry  · Appreciate cardiology input

## 2018-06-21 NOTE — H&P
H&P- Cristhian Davila 1935, 82 y o  male MRN: [id number]    Unit/Bed#: ED 10 Encounter: [id number]    Primary Care Provider: Carla Waters DO   Date and time admitted to hospital: 6/21/2018  2:20 AM    * Acute congestive heart failure ([address] )   Assessment & Plan    · Presented with worsening cough, SOB x2 weeks  · CXR- Pulmonary vascular congestion  · D-Dimer 1490  · CTA Chest-  Moderate right and small-to-moderate left pleural effusions with bilateral basilar consolidations; superimposed infection must be excluded clinically  · BNP 6165  · Echo 12/23/15 at [facility]- LVEF 40%  · Repeat Echo  · Daily weights, I&Os  · Fluid restriction  · Lasix  · Consult cardiology  Elevated troponin   Assessment & Plan    · Troponin 0 11   · EKG- Atrial fibrillation, rate 105  · Likely NSTEMI type II secondary to CHF  · Trend troponin  · Telemetry  · Appreciate cardiology input  Pneumonia of both lower lobes due to infectious organism [facility])   Assessment & Plan    · Bilateral basilar consolidation seen on CTA Chest   · Afebrile without leukocytosis  · Respiratory protocol  · Rocephin, Azithromycin  · Urine strep/legionella  · Sputum culture  · Blood cultures pending  Paroxysmal A-fib (HCC)   Assessment & Plan    · Rate 98  · Continue PO Cardizem  Hypertension   Assessment & Plan    · /80  · Continue Lisinopril  Chronic venous insufficiency   Assessment & Plan    · Patient's aid reports that his LE erythema is much improved and his edema is at baseline  · Elevated extremities  · GREG stockings  VTE Prophylaxis: Enoxaparin (Lovenox)  / sequential compression device   Code Status: Full Code  POLST: POLST form is not discussed and not completed at this time  Discussion with family: Discussed with patient, aid at bedside      Anticipated Length of Stay:  Patient will be admitted on an Inpatient basis with an anticipated length of stay of  Greater than 2 midnights  Justification for Hospital Stay: Acute on Chronic CHF, PNA  Total Time for Visit, including Counseling / Coordination of Care: 45 minutes  Greater than 50% of this total time spent on direct patient counseling and coordination of care  Chief Complaint:   SOB  History of Present Illness:    Dinesh Varma is a 82 y o  male, PMHx of CHF, A fib, HTN, chronic venous stasis, prior CVA, who presents with SOB x2 weeks, worsened last night  Patient reports that he started having SOB with activity approximately 2 weeks ago  When he would get up to walk to the bathroom or would be getting in and out of bed he felt very winded  However, last night his breathing was much worse  He also notes a non-productive cough  He denies fever, chills, diaphoresis, CP, palpitations  He is unsure of any change in weight  Patient's aid at bedside reports that his leg swelling is at his baseline and his leg redness is actually improved since he has been wearing compression stockings and elevating his legs more  Review of Systems:    Review of Systems   Constitutional: Negative for chills, diaphoresis and fever  Respiratory: Positive for cough and shortness of breath  Negative for wheezing  Cardiovascular: Positive for leg swelling  Negative for chest pain and palpitations  Gastrointestinal: Negative for abdominal pain, constipation, diarrhea, nausea and vomiting  Genitourinary: Negative for dysuria, frequency, hematuria and urgency  Neurological: Negative for dizziness, light-headedness and headaches  All other systems reviewed and are negative        Past Medical and Surgical History:     Past Medical History:   Diagnosis Date    Cardiomyopathy [facility])     with EF of 40 % 12/15    Cellulitis of lower extremity 6/21/2018    Cervical spinal stenosis     Cervical spondylosis     CHF (congestive heart failure) (HCC)     Chronic venous stasis dermatitis of both lower extremities     DDD (degenerative disc disease), lumbar     Degenerative cervical disc     Discitis of lumbar region     Elevated troponin 6/21/2018    History of BPH     Hypertension     Lumbar canal stenosis     OA (osteoarthritis)     Osteomyelitis (HCC)     Paroxysmal A-fib (HCC)     Pneumonia of both lower lobes due to infectious organism ([facility] [address] ) 6/21/2018    Spinal stenosis     Urinary retention        Past Surgical History:   Procedure Laterality Date    BACK SURGERY      FOOT SURGERY      HIP SURGERY      [place] COLONOSCOPY FLX DX W/COLLJ SPEC WHEN PFRMD N/A 11/3/2016    Procedure: COLONOSCOPY;  Surgeon: Claudy Byrne MD;  Location: [facility]; Service: Gastroenterology    ROTATOR CUFF REPAIR         Meds/Allergies:    Prior to Admission medications    Medication Sig Start Date End Date Taking? Authorizing Provider   bifidobacterium infantis (ALIGN) capsule Take 1 capsule by mouth daily 1/3/17  Yes Historical Provider, MD   diltiazem (CARDIZEM) 90 mg tablet Take 90 mg by mouth daily     Yes Historical Provider, MD   finasteride (PROSCAR) 5 mg tablet Take 5 mg by mouth daily   Yes Historical Provider, MD   lisinopril (ZESTRIL) 2 5 mg tablet Take 2 5 mg by mouth daily   Yes Historical Provider, MD   acetaminophen (TYLENOL) 325 mg tablet Take 650 mg by mouth every 6 (six) hours as needed for mild pain  6/21/18  Historical Provider, MD   atorvastatin (LIPITOR) 10 mg tablet Take 10 mg by mouth daily  6/21/18  Historical Provider, MD   carbamide peroxide (DEBROX) 6 5 % otic solution 5 drops 2 (two) times a day  6/21/18  Historical Provider, MD   ferrous sulfate 325 (65 Fe) mg tablet Take 325 mg by mouth daily with breakfast  6/21/18  Historical Provider, MD   potassium chloride (MICRO-K) 10 MEQ CR capsule Take 10 mEq by mouth 2 (two) times a day  6/21/18  Historical Provider, MD     I have reviewed home medications with patient personally  Allergies:    Allergies   Allergen Reactions    Morphine        Social History:     Marital Status: Single   Occupation: Unknown  Patient Pre-hospital Living Situation: Home  Patient Pre-hospital Level of Mobility: Limited  Uses walker, wheelchair  Patient Pre-hospital Diet Restrictions: None  Substance Use History:   History   Alcohol Use No     History   Smoking Status    Former Smoker   Smokeless Tobacco    Never Used     History   Drug Use No       Family History:    non-contributory    Physical Exam:     Vitals:   Blood Pressure: 157/81 (06/21/18 0790)  Pulse: 98 (06/21/18 0613)  Temperature: (!) 97 4 °F (36 3 °C) (06/21/18 0225)  Temp Source: Oral (06/21/18 0225)  Respirations: 18 (06/21/18 3475)  Weight - Scale: 86 kg (189 lb 9 5 oz) (06/21/18 0222)  SpO2: 98 % (06/21/18 1606)    Physical Exam   Constitutional: He is oriented to person, place, and time  He appears well-developed and well-nourished  He is cooperative  He does not appear ill  No distress  HENT:   Head: Normocephalic and atraumatic  Eyes: Conjunctivae, EOM and lids are normal  Pupils are equal, round, and reactive to light  Cardiovascular: Normal rate, normal heart sounds and normal pulses  An irregularly irregular rhythm present  No murmur heard  +4 pitting LE edema bilaterally  Pulmonary/Chest: Effort normal  He has decreased breath sounds  He has no rhonchi  He has rales (bilateral bases)  Abdominal: Soft  Normal appearance and bowel sounds are normal  There is no tenderness  Musculoskeletal: Normal range of motion  Neurological: He is alert and oriented to person, place, and time  He has normal strength  He is not disoriented  No sensory deficit  Skin: Skin is warm, dry and intact  He is not diaphoretic  Erythema of bilateral LE and feet without tenderness to palpation or warmth  Psychiatric: He has a normal mood and affect  His speech is normal and behavior is normal  Cognition and memory are normal    Vitals reviewed        Additional Data:     Lab Results: I have personally reviewed pertinent reports  Results from last 7 days  Lab Units 06/21/18  0242   WBC Thousand/uL 5 17   HEMOGLOBIN g/dL 12 7   HEMATOCRIT % 41 4   PLATELETS Thousands/uL 116*   NEUTROS PCT % 63   LYMPHS PCT % 27   MONOS PCT % 10   EOS PCT % 1       Results from last 7 days  Lab Units 06/21/18  0243   SODIUM mmol/L 142   POTASSIUM mmol/L 4 1   CHLORIDE mmol/L 104   CO2 mmol/L 31   BUN mg/dL 30*   CREATININE mg/dL 1 05   CALCIUM mg/dL 8 9   TOTAL PROTEIN g/dL 7 3   BILIRUBIN TOTAL mg/dL 0 50   ALK PHOS U/L 99   ALT U/L 37   AST U/L 37   GLUCOSE RANDOM mg/dL 102       Results from last 7 days  Lab Units 06/21/18  0243   INR  1 08               Imaging: I have personally reviewed pertinent reports  CTA ED chest PE study   ED Interpretation by Deya Camara MD (06/21 0530)   FINDINGS:      PULMONARY ARTERIAL TREE:  No pulmonary embolus is seen  LUNGS and pleura: Moderate right and small-to-moderate left pleural effusions with bilateral basilar consolidations; superimposed infection must be excluded clinically   There is no tracheal or endobronchial lesion  HEART/AORTA:  Unremarkable for patient's age  MEDIASTINUM AND TRINITY:  Unremarkable  CHEST WALL AND LOWER NECK:   Unremarkable  VISUALIZED STRUCTURES IN THE UPPER ABDOMEN:  Few simple cysts in the liver   Bilateral adrenal gland thickening   [id number]  OSSEOUS STRUCTURES:  No acute fracture or destructive osseous lesion   T10-T11 and L2 transpedicular instrumentation are noted  Impression:        Moderate right and small-to-moderate left pleural effusions with bilateral basilar consolidations; superimposed infection must be excluded clinically                     Workstation performed: [id number]         Final Result by Alex Mireles MD (06/21 0527)      Moderate right and small-to-moderate left pleural effusions with bilateral basilar consolidations; superimposed infection must be excluded clinically                      Workstation performed: [id number]         XR chest 1 view portable   ED Interpretation by Charlotte Ray MD (06/21 0400)   Cardiomegaly, increased interstitial markings read by me  EKG, Pathology, and Other Studies Reviewed on Admission:   · EKG: Atrial fibrillation, rate 105  Allscripts / Epic Records Reviewed: Yes     ** Please Note: This note has been constructed using a voice recognition system   **

## 2018-06-22 PROBLEM — J90 PLEURAL EFFUSION, BILATERAL: Status: ACTIVE | Noted: 2018-01-01

## 2018-06-22 PROBLEM — J18.9 PNEUMONIA OF BOTH LOWER LOBES DUE TO INFECTIOUS ORGANISM: Status: RESOLVED | Noted: 2018-01-01 | Resolved: 2018-01-01

## 2018-06-22 NOTE — ASSESSMENT & PLAN NOTE
· Reports improved shortness of breath since presentation    Continue IV diuretics today  · Echocardiogram revealed EF 40%, moderate pulmonary hypertension, mild to moderate mitral regurg, moderate TR  · Continue I's and O's, daily weights, 2 g sodium diet  · Monitor renal function closely while on IV diuretics  · Continue metoprolol, lisinopril

## 2018-06-22 NOTE — CASE MANAGEMENT
Notification of Inpatient Admission/Inpatient Authorization Request  This is a Notification of Inpatient Admission/Request for Inpatient Authorization to our facility [facility]  Please be advised that this patient is currently in our facility under Inpatient Status  Below you will find the Attending Physician and Facilitys information including NPI# and contact information for the Utilization  assigned to the [facility] where the patient is receiving services  Please feel free to contact the Utilization Review Department with any questions  Patient Information:  PATIENT NAME: Tia Walker  MRN: [id number]  YOB: 1935    PRESENTATION DATE: 6/21/2018  2:20 AM  IP ADMISSION DATE: 6/21/18 0547  DISCHARGE DATE: No discharge date for patient encounter  DISPOSITION: Home/Self Care(Please Disregard the Disposition as the member is still in house with us)    Attending Physician:  NORMAN Abel  Specialty- Hospitalist,   [facility] ID- [id number]  [address] [place] [address]  Phone 1: [phone]  Fax: [phone]  Facility:  [facility]  Address: [address] [place] [facility], [address] [place] [address]    Phone: [phone]  Tax ID [id number]  NPI [id number]   Medicare: [id number]    [address] [place] [address] in the [facility] by Rohith Randall for 2017  Network Utilization Review Department  Phone: [phone]; Fax [phone]  ATTENTION: The Network Utilization Review Department is now centralized for our 7 Facilities  Make a note that we have a new phone and fax numbers for our Department  Please call with any questions or concerns to [phone] and carefully follow the prompts so that you are directed to the right person   All voicemails are confidential  Fax any determinations, approvals, denials, and requests for initial or continue stay review clinical to [phone]  Due to HIGH CALL volume, it would be easier if you could please send faxed requests to expedite your requests and in part, help us provide discharge notifications faster

## 2018-06-22 NOTE — PROGRESS NOTES
Progress Note - Cardiology   Orpha Less 82 y o  male MRN: [id number]  Encounter: [id number]  06/22/18  11:09 AM        Assessment/Plan:    1  Acute/chronic systolic HF  He is on Lasix 40 IV b i d  He is urinating well with improvement in symptoms  Continue IV Lasix today  Recheck BMP in a m  2    Nonischemic cardiomyopathy with an EF of 40%  He has been started on metoprolol tartrate 25 b i d  His home lisinopril 2 5 mg daily has been continued  Would recommend titrating these as tolerated  3    Paroxysmal atrial fibrillation  He is on metoprolol 25 b i d  Heart rate is controlled on telemetry  He was not on anticoagulation prior to admission for unclear reasons  4    Hypertension  Blood pressure is controlled on current regimen  Monitor with titration of heart failure regimen  5  Type II NSTEMI  Abnormal troponin likely due to volume overload/acute on chronic systolic HF exacerbation  He was previously following with Dr Jeanie Wu at [facility]  Subjective/Objective   Chief Complaint:   Chief Complaint   Patient presents with    Shortness of Breath     Patient brought in by EMS for sob that has been ongoing on and off x 1 week  Patient woke up out of sleep tonight with sob  EMS also states that pt has some kind of "cellulitis on his lower extremities " Patient spo2 was in 70's at scene and was using accessory muscles to breathe  Subjective:   82-year-old man with a history of nonischemic cardiomyopathy with an ejection fraction of 35%, chronic systolic heart failure, hypertension, paroxysmal atrial fibrillation not on anticoagulation prior to admission, who was admitted with shortness of breath for last 3 weeks, found to have acute/chronic systolic HF  Chest x-ray showed right greater than left pleural effusions  CTA of the chest showed a moderate right and small to moderate left pleural effusion      He has a caretaker through [employer], who reports that multiple medications he was previously taking were discontinued approximately 4 months prior to admission, including Lasix, for unclear reasons  He is bedbound/wheelchair bound at baseline due to spinal stenosis  ECHO during this admission 6/18 shows EF 40% with mild diffuse hypokinesis, LVH, RV size at upper limits of normal with low normal systolic function  Mild aortic stenosis with mild aortic regurgitation  Moderate tricuspid regurgitation with the pulmonary artery systolic pressure of 60-48 mm Hg, consistent with moderate pulmonary HTN  He reports his breathing is better today  His lower extremity edema is improving  He reports he is urinating well  No fevers  No chest pain  No nausea or vomiting          Patient Active Problem List   Diagnosis    [facility]oxysmal A-fib ([facility] )    Essential hypertension    Acute on chronic systolic congestive heart failure ([facility] )    Cardiomyopathy ([place] [address] )    Pneumonia of both lower lobes due to infectious organism [facility])    Chronic venous insufficiency    Type 2 myocardial infarction [facility])     Past Medical History:   Diagnosis Date    Cardiomyopathy [facility])     with EF of 40 % 12/15    Cellulitis of lower extremity 6/21/2018    Cervical spinal stenosis     Cervical spondylosis     [facility]est[facility] failure[facility]     Chronic venous stasis dermatitis of both lower extremities     DDD (degenerative disc disease), lumbar     Degenerative cervical disc     Discitis of lumbar region     Elevated troponin 6/21/2018    History of BPH     Hypertension     Lumbar canal stenosis     OA (osteoarthritis)     [facility]omyelitis [facility]     [facility]oxysmal A-fib ([facility])     Pneumonia of both lower lobes due to infectious organism ([facility] ) 6/21/2018    Spinal stenosis     Urinary retention        Allergies   Allergen Reactions    Morphine        Current Facility-Administered Medications   Medication Dose Route Frequency Provider Last Rate Last Dose    acetaminophen (TYLENOL) tablet 650 mg  650 mg Oral Q6H PRN Hosea Antoine, PA-C        aspirin chewable tablet 81 mg  81 mg Oral Daily Hosea Confer, PA-C   81 mg at 06/22/18 0919    enoxaparin (LOVENOX) subcutaneous injection 40 mg  40 mg Subcutaneous Daily Hosea Confer, PA-C   40 mg at 06/22/18 0919    finasteride (PROSCAR) tablet 5 mg  5 mg Oral Daily Hosea Confer, PA-C   5 mg at 06/22/18 1401    furosemide (LASIX) injection 40 mg  40 mg Intravenous BID Hosea Confer, PA-C   40 mg at 06/22/18 0919    lisinopril (ZESTRIL) tablet 2 5 mg  2 5 mg Oral Daily Hosea Confer, PA-C   2 5 mg at 06/22/18 0919    metoprolol tartrate (LOPRESSOR) tablet 25 mg  25 mg Oral Q12H [facility] Tina Parsons, CRNP   25 mg at 06/22/18 0919    nitroglycerin (NITROSTAT) SL tablet 0 4 mg  0 4 mg Sublingual Q5 Min PRN Hosea Confer, PA-C        ondansetron [facility]) injection 4 mg  4 mg Intravenous Q6H PRN Hosea Antoine, PA-C           Vitals: /76 (BP Location: Left arm)   Pulse 87   Temp (!) 97 3 °F (36 3 °C) (Oral)   Resp 18   Ht 5' 10" (1 778 m)   Wt 76 5 kg (168 lb 10 4 oz)   SpO2 100%   BMI 24 20 kg/m²     Intake/Output Summary (Last 24 hours) at 06/22/18 1109  Last data filed at 06/22/18 0752   Gross per 24 hour   Intake              180 ml   Output             2494 ml   Net            -2314 ml     Wt Readings from Last 3 Encounters:   06/22/18 76 5 kg (168 lb 10 4 oz)   01/03/17 68 5 kg (151 lb)   11/03/16 78 kg (172 lb)       Body mass index is 24 2 kg/m²  ,     Vitals:    06/21/18 1045 06/21/18 1629 06/21/18 2300 06/22/18 0734   BP:  97/63 121/56 120/76   Pulse: 98 63 83 87   Patient Position - Orthostatic VS:  Lying Lying Lying       Physical Exam:     GEN: Awake and alert, in no acute distress  HEENT: Sclera anicteric, conjunctivae pink, mucous membranes moist   NECK: Supple, no carotid bruits, JVD to 7 cm above sternal angle     HEART: Regular rhythm, normal S1 and S2, no murmurs, clicks, gallops or rubs  LUNGS: Decreased at bases bilaterally; no wheezes, rales, or rhonchi   ABDOMEN: Soft, nontender, nondistended, normoactive bowel sounds  EXTREMITIES: Skin warm and well perfused, no clubbing, cyanosis, 2+ edema bilaterally, improving  NEURO: No focal findings  SKIN: Normal without suspicious lesions on exposed skin  Lab Results:     BMP:  Results from last 7 days  Lab Units 18  2231 18  0243   SODIUM mmol/L 140 142   POTASSIUM mmol/L 4 1 4 1   CHLORIDE mmol/L 103 104   CO2 mmol/L 36* 31   BUN mg/dL 31* 30*   CREATININE mg/dL 1 12 1 05   GLUCOSE RANDOM mg/dL 89 102   CALCIUM mg/dL 8 9 8 9       CBC:   Results from last 7 days  Lab Units 18  0519 18  0903 18  0242   WBC Thousand/uL 5 87  --  5 17   HEMOGLOBIN g/dL 11 9*  --  12 7   HEMATOCRIT % 39 2  --  41 4   MCV fL 97  --  96   PLATELETS Thousands/uL 108* 112* 116*   MCH pg 29 5  --  29 4   MCHC g/dL 30 4*  --  30 7*   RDW % 15 5*  --  15 4*   MPV fL 13 0* 12 6 13 2*         Results from last 7 days  Lab Units 18  2231 18  1713 18  1348   TROPONIN I ng/mL 0 15* 0 16* 0 14*         Results from last 7 days  Lab Units 18  0243   NT-PRO BNP pg/mL 6,165*               INR:     Results from last 7 days  Lab Units 18  0243   INR  1 08       Lipid Profile:   No results found for: CHOL  No results found for: HDL  No results found for: LDLCALC  No results found for: TRIG      Hgb A1c:         Telemetry: personally reviewed, occasional NSVT, controlled HR        Cardiac testing:   Results for orders placed during the hospital encounter of 18   Echo complete with contrast if indicated    Narrative [place] [address], [address] [facility]  [phone]    Transthoracic Echocardiogram  2D, M-mode, Doppler, and Color Doppler    Study date:  2018    Patient: Chantel Cloud  MR number: [id number]  Account number: [de-identified]  : [id number]  Age: 82 years  Gender: Male  Status: Inpatient  Location: Bedside  Height: 70 in  Weight: 176 lb  BP: 145/ 81 mmHg    Indications: Heart Failure    Diagnoses: I50 9 - Heart failure, unspecified    Sonographer:  LC Maurice  Primary Physician:  Klever Lindo DO  Referring Physician:  Christina Tello PA-C  Group:  [facility]  Interpreting Physician:  Dinora Araiza MD    SUMMARY    LEFT VENTRICLE:  Systolic function was mildly reduced  Ejection fraction was estimated to be 40 %  There was mild diffuse hypokinesis  Wall thickness was increased  Concentric hypertrophy was present  RIGHT VENTRICLE:  The size was at the upper limits of normal   Systolic function was low normal     AORTIC VALVE:  The valve was trileaflet  Leaflets exhibited moderately increased thickness, moderate calcification, and moderately reduced cuspal separation  There was mild stenosis  There was mild regurgitation  TRICUSPID VALVE:  There was moderate regurgitation  Estimated peak PA pressure was 55 - 60 mmHg  The findings suggest moderate pulmonary hypertension  PERICARDIUM:  There was a left pleural effusion  HISTORY: PRIOR HISTORY: AFIB, Cardiomyopathy, Hypertension, CHF    PROCEDURE: The procedure was performed at the bedside  This was a routine study  The transthoracic approach was used  The study included complete 2D imaging, M-mode, complete spectral Doppler, and color Doppler  The heart rate was 92 bpm,  at the start of the study  Images were obtained from the parasternal, apical, subcostal, and suprasternal notch acoustic windows  Image quality was adequate  LEFT VENTRICLE: Size was normal  Systolic function was mildly reduced  Ejection fraction was estimated to be 40 %  There was mild diffuse hypokinesis  Wall thickness was increased  Concentric hypertrophy was present      RIGHT VENTRICLE: The size was at the upper limits of normal  Systolic function was low normal     LEFT ATRIUM: The atrium was dilated  RIGHT ATRIUM: The atrium was dilated  MITRAL VALVE: There was annular calcification  There was mild-moderate thickening  There was normal leaflet separation  There was systolic bowing, but without diagnostic evidence for prolapse  DOPPLER: There was no evidence for stenosis  There was mild to moderate regurgitation  AORTIC VALVE: The valve was trileaflet  Leaflets exhibited moderately increased thickness, moderate calcification, and moderately reduced cuspal separation  DOPPLER: There was mild stenosis  There was mild regurgitation  TRICUSPID VALVE: The valve structure was normal  There was normal leaflet separation  DOPPLER: There was no evidence for stenosis  There was moderate regurgitation  Estimated peak PA pressure was 55 - 60 mmHg  The findings suggest moderate  pulmonary hypertension  PULMONIC VALVE: Leaflets exhibited normal thickness, no calcification, and normal cuspal separation  DOPPLER: The transpulmonic velocity was within the normal range  There was mild regurgitation  PERICARDIUM: There was a left pleural effusion  AORTA: The root exhibited upper limit of normal size at 3 8 cm  SYSTEMIC VEINS: IVC: The inferior vena cava was dilated  The respirophasic change in diameter was less than 50%      SYSTEM MEASUREMENT TABLES    2D  %FS: 11 52 %  AV Diam: 3 32 cm  EDV(Teich): 126 97 ml  EF(Teich): 24 82 %  ESV(Teich): 95 45 ml  HR_4Ch_Q: 84 45 BPM  IVSd: 1 22 cm  LA Area: 32 95 cm2  LA Diam: 3 69 cm  LVCO_4Ch_Q: 1 86 L/min  LVEDV MOD A4C: 131 46 ml  LVEF MOD A4C: 49 93 %  LVEF_4Ch_Q: 25 61 %  LVESV MOD A4C: 65 82 ml  LVIDd: 5 16 cm  LVIDs: 4 56 cm  LVLd A4C: 7 96 cm  LVLd_4Ch_Q: 7 38 cm  LVLs A4C: 6 85 cm  LVLs_4Ch_Q: 6 67 cm  LVOT Diam: 2 24 cm  LVPWd: 1 3 cm  LVSV_4Ch_Q: 22 02 ml  LVVED_4Ch_Q: 85 98 ml  LVVES_4Ch_Q: 63 97 ml  RA Area: 24 87 cm2  RVIDd: 4 19 cm  SV MOD A4C: 65 64 ml  SV(Teich): 31 52 ml    CW  AR Dec [place]: 4 44 m/s2  AR Dec Time: 987 94 ms  AR PHT: 286 5 ms  AR Vmax: 4 38 m/s  AR maxP 96 mmHg  AV Env  Ti: 268 17 ms  AV MaxP 16 mmHg  AV SV: 374 94 ml  AV VTI: 43 45 cm  AV Vmax: 2 29 m/s  AV Vmean: 1 63 m/s  AV meanP 27 mmHg  TR MaxP 26 mmHg  TR Vmax: 3 51 m/s    MM  TAPSE: 1 69 cm    PW  GAUTAM (VTI): 0 99 cm2  GAUTAM Vmax: 0 91 cm2  LVOT Env  Ti: 293 43 ms  LVOT VTI: 10 93 cm  LVOT Vmax: 0 53 m/s  LVOT Vmean: 0 37 m/s  LVOT maxP 13 mmHg  LVOT meanP 65 mmHg  LVSV Dopp: 42 9 ml    [employer] Accredited Echocardiography Laboratory    Prepared and electronically signed by    Jasen De La Torre MD  Signed 2018 13:19:55

## 2018-06-22 NOTE — PROGRESS NOTES
Progress Note Amelia Robby 1935, 82 y o  male MRN: [id number]    Unit/Bed#: -01 Encounter: [id number]    Primary Care Provider: Patricio Osorio DO   Date and time admitted to hospital: 6/21/2018  2:20 AM    * Acute on chronic systolic congestive heart failure ([address] )   Assessment & Plan    · Reports improved shortness of breath since presentation  Continue IV diuretics today  · Echocardiogram revealed EF 40%, moderate pulmonary hypertension, mild to moderate mitral regurg, moderate TR  · Continue I's and O's, daily weights, 2 g sodium diet  · Monitor renal function closely while on IV diuretics  · Continue metoprolol, lisinopril       Type 2 myocardial infarction [facility])   Assessment & Plan    · Likely NSTEMI type II secondary to CHF  · Continue management of CHF  Cardiology following      Paroxysmal A-fib [facility])   Assessment & Plan    · Rate controlled  Continue metoprolol  Not on anticoagulation prior to admission for unclear reasons      Essential hypertension   Assessment & Plan    · Stable  Continue current medications      Pleural effusion, bilateral   Assessment & Plan    · R>L bilateral pleural effusion likely secondary to CHF  · Continue IV diuretics as above        VTE Pharmacologic Prophylaxis:   Pharmacologic: Enoxaparin (Lovenox)  Mechanical VTE Prophylaxis in Place: Yes    Patient Centered Rounds: I have performed bedside rounds with nursing staff today  Discussions with Specialists or Other Care Team Provider:   Nursing    Education and Discussions with Family / Patient:   Patient/updated patient's grandson/SANDOR Jo Bradley on phone  All questions answered      Current Length of Stay: 1 day(s)    Current Patient Status: Inpatient   Certification Statement: The patient will continue to require additional inpatient hospital stay due to Above diagnosis and care plan    Discharge Plan:   Not medically stable for discharge     Code Status: Level 1 - Full Code      Subjective: Patient seen and evaluated  Reports feeling better presentation  He denies chest pain, no shortness of breath at rest     Objective:     Vitals:   Temp (24hrs), Av 6 °F (36 4 °C), Min:97 3 °F (36 3 °C), Max:97 9 °F (36 6 °C)    HR:  [63-87] 83  Resp:  [16-18] 18  BP: ()/(52-76) 92/52  SpO2:  [97 %-100 %] 99 %  Body mass index is 24 2 kg/m²  Input and Output Summary (last 24 hours): Intake/Output Summary (Last 24 hours) at 18 1538  Last data filed at 18 1500   Gross per 24 hour   Intake              510 ml   Output             1294 ml   Net             -784 ml       Physical Exam:  General Appearance:    Alert, chronically ill-appearing cooperative, no distress, appropriately responsive    Head:    Normocephalic, without obvious abnormality, atraumatic, mucous membranes moist    Eyes:    Conjunctiva/corneas clear, EOM's intact   Neck:   Supple   Lungs:     Decreased emerald sounds at the bases    Heart:    Irregularly irregular, S1 and S2    Abdomen:     Soft, non-tender, nondistended   Extremities:   +2 bilateral lower extremity edema, chronic stasis changes   Neurologic:  nonfocal         Additional Data:     Labs:      Results from last 7 days  Lab Units 18  0519  18  0242   WBC Thousand/uL 5 87  --  5 17   HEMOGLOBIN g/dL 11 9*  --  12 7   HEMATOCRIT % 39 2  --  41 4   PLATELETS Thousands/uL 108*  < > 116*   NEUTROS PCT %  --   --  63   LYMPHS PCT %  --   --  27   MONOS PCT %  --   --  10   EOS PCT %  --   --  1   < > = values in this interval not displayed      Results from last 7 days  Lab Units 18  2231 18  0243   SODIUM mmol/L 140 142   POTASSIUM mmol/L 4 1 4 1   CHLORIDE mmol/L 103 104   CO2 mmol/L 36* 31   BUN mg/dL 31* 30*   CREATININE mg/dL 1 12 1 05   CALCIUM mg/dL 8 9 8 9   TOTAL PROTEIN g/dL  --  7 3   BILIRUBIN TOTAL mg/dL  --  0 50   ALK PHOS U/L  --  99   ALT U/L  --  37   AST U/L  --  37   GLUCOSE RANDOM mg/dL 89 102       Results from last  days  Lab Units 06/21/18  0243   INR  1 08       * I Have Reviewed All Lab Data Listed Above  * Additional Pertinent Lab Tests Reviewed: Dex 66 Admission Reviewed    Cultures:   Blood Culture:   Lab Results   Component Value Date    BLOODCX No Growth at 24 hrs  06/21/2018    BLOODCX No Growth at 24 hrs  06/21/2018     Urine Culture: No results found for: URINECX  Sputum Culture: No components found for: SPUTUMCX  Wound Culture: No results found for: WOUNDCULT    Last 24 Hours Medication List:     Current Facility-Administered Medications:  acetaminophen 650 mg Oral Q6H PRN Gerard Nicanor, PA-C   aspirin 81 mg Oral Daily Gerard Nicanor, PA-C   enoxaparin 40 mg Subcutaneous Daily Gerard Nicanor, PA-C   finasteride 5 mg Oral Daily Gerard Nicanor, PA-C   furosemide 40 mg Intravenous BID Anayeli Raphael MD   [START ON 6/23/2018] lisinopril 5 mg Oral Daily Anayeli Raphael MD   metoprolol tartrate 50 mg Oral Q12H [address] Anayeli Raphael MD   nitroglycerin 0 4 mg Sublingual Q5 Min PRN Gerard Nicanor, PA-C   ondansetron 4 mg Intravenous Q6H PRN Gerard Nicanor, PA-C        Today, Patient Was Seen By: Gustavo Trammell MD    ** Please Note: Dragon 360 Dictation voice to text software may have been used in the creation of this document   **

## 2018-06-22 NOTE — ASSESSMENT & PLAN NOTE
· Rate controlled  Continue metoprolol    Not on anticoagulation prior to admission for unclear reasons

## 2018-06-23 NOTE — PLAN OF CARE
Problem: Potential for Falls  Goal: Patient will remain free of falls  INTERVENTIONS:  - Assess patient frequently for physical needs  -  Identify cognitive and physical deficits and behaviors that affect risk of falls  -  Arthur fall precautions as indicated by assessment   - Educate patient/family on patient safety including physical limitations  - Instruct patient to call for assistance with activity based on assessment  - Modify environment to reduce risk of injury  - Consider OT/PT consult to assist with strengthening/mobility    Outcome: Progressing      Problem: PAIN - ADULT  Goal: Verbalizes/displays adequate comfort level or baseline comfort level  Interventions:  - Encourage patient to monitor pain and request assistance  - Assess pain using appropriate pain scale  - Administer analgesics based on type and severity of pain and evaluate response  - Implement non-pharmacological measures as appropriate and evaluate response  - Consider cultural and social influences on pain and pain management  - Notify physician/advanced practitioner if interventions unsuccessful or patient reports new pain   Outcome: Progressing      Problem: SAFETY ADULT  Goal: Patient will remain free of falls  INTERVENTIONS:  - Assess patient frequently for physical needs  -  Identify cognitive and physical deficits and behaviors that affect risk of falls    -  Arthur fall precautions as indicated by assessment   - Educate patient/family on patient safety including physical limitations  - Instruct patient to call for assistance with activity based on assessment  - Modify environment to reduce risk of injury  - Consider OT/PT consult to assist with strengthening/mobility    Outcome: Progressing      Problem: CARDIOVASCULAR - ADULT  Goal: Maintains optimal cardiac output and hemodynamic stability  INTERVENTIONS:  - Monitor I/O, vital signs and rhythm  - Monitor for S/S and trends of decreased cardiac output i e  bleeding, hypotension  - Administer and titrate ordered vasoactive medications to optimize hemodynamic stability  - Assess quality of pulses, skin color and temperature  - Assess for signs of decreased coronary artery perfusion - ex   Angina  - Instruct patient to report change in severity of symptoms   Outcome: Progressing    Goal: Absence of cardiac dysrhythmias or at baseline rhythm  INTERVENTIONS:  - Continuous cardiac monitoring, monitor vital signs, obtain 12 lead EKG if indicated  - Administer antiarrhythmic and heart rate control medications as ordered  - Monitor electrolytes and administer replacement therapy as ordered   Outcome: Progressing      Problem: RESPIRATORY - ADULT  Goal: Achieves optimal ventilation and oxygenation  INTERVENTIONS:  - Assess for changes in respiratory status  - Assess for changes in mentation and behavior  - Position to facilitate oxygenation and minimize respiratory effort  - Oxygen administration by appropriate delivery method based on oxygen saturation (per order) or ABGs  - Initiate smoking cessation education as indicated  - Encourage broncho-pulmonary hygiene including cough, deep breathe, Incentive Spirometry  - Assess the need for suctioning and aspirate as needed  - Assess and instruct to report SOB or any respiratory difficulty  - Respiratory Therapy support as indicated   Outcome: Progressing      Problem: METABOLIC, FLUID AND ELECTROLYTES - ADULT  Goal: Electrolytes maintained within normal limits  INTERVENTIONS:  - Monitor labs and assess patient for signs and symptoms of electrolyte imbalances  - Administer electrolyte replacement as ordered  - Monitor response to electrolyte replacements, including repeat lab results as appropriate  - Instruct patient on fluid and nutrition as appropriate   Outcome: Progressing    Goal: Fluid balance maintained  INTERVENTIONS:  - Monitor labs and assess for signs and symptoms of volume excess or deficit  - Monitor I/O and WT  - Instruct patient on fluid and nutrition as appropriate   Outcome: Progressing      Problem: Prexisting or High Potential for Compromised Skin Integrity  Goal: Skin integrity is maintained or improved  INTERVENTIONS:  - Identify patients at risk for skin breakdown  - Assess and monitor skin integrity  - Assess and monitor nutrition and hydration status  - Monitor labs (i e  albumin)  - Assess for incontinence   - Turn and reposition patient  - Assist with mobility/ambulation  - Relieve pressure over bony prominences  - Avoid friction and shearing  - Provide appropriate hygiene as needed including keeping skin clean and dry  - Evaluate need for skin moisturizer/barrier cream  - Collaborate with interdisciplinary team (i e  Nutrition, Rehabilitation, etc )   - Patient/family teaching   Outcome: Progressing

## 2018-06-23 NOTE — PROGRESS NOTES
Progress Note Gilma Cerrato 1935, 82 y o  male MRN: [id number]    Unit/Bed#: -01 Encounter: [id number]    Primary Care Provider: Desean Jacques DO   Date and time admitted to hospital: 6/21/2018  2:20 AM    * Acute on chronic systolic congestive heart failure ([address] )   Assessment & Plan    · Continue IV diuretics, sob improved but still clinically volume overload although much improved from admission  · He is -4L and weight down approx 10kg since admission  Renal function stable so far  · Echocardiogram revealed EF 40%, moderate pulmonary hypertension, mild to moderate mitral regurg, moderate TR  · Continue I's and O's, daily weights, 2 g sodium diet  · Monitor renal function closely while on IV diuretics  · Continue metoprolol, lisinopril   · PT/OT eval        Type 2 myocardial infarction [facility])   Assessment & Plan    · Likely NSTEMI type II secondary to CHF  · Continue management of CHF  Cardiology following      Paroxysmal A-fib [facility])   Assessment & Plan    · Rate controlled  Continue metoprolol  Not on anticoagulation prior to admission for unclear reasons      Pleural effusion, bilateral   Assessment & Plan    · R>L bilateral pleural effusion likely secondary to CHF  · Continue IV diuretics as above  · Overall clinically improved      Essential hypertension   Assessment & Plan    · Stable  Continue current medications      Chronic venous insufficiency   Assessment & Plan    · Bilateral LE edema/erythema is much improved from admission  · Continue diuretics as above, elevate extremity  · TEDS stockings on discharge        VTE Pharmacologic Prophylaxis:   Pharmacologic: Enoxaparin (Lovenox)  Mechanical VTE Prophylaxis in Place: Yes    Patient Centered Rounds: I have performed bedside rounds with nursing staff today      Discussions with Specialists or Other Care Team Provider: Nursing    Education and Discussions with Family / Patient: Patient    Current Length of Stay: 2 day(s)    Current Patient Status: Inpatient   Certification Statement: The patient will continue to require additional inpatient hospital stay due to Above diagnosis and care plan    Discharge Plan: Anticipate next 24-48hrs if stable    Code Status: Level 1 - Full Code      Subjective:   No new complaints  SOB improved and LE edema much decreased  Reports he went to outpatient PT 3x/week MWF prior to admission    Objective:     Vitals:   Temp (24hrs), Av 9 °F (36 6 °C), Min:97 5 °F (36 4 °C), Max:98 2 °F (36 8 °C)    HR:  [79-89] 79  Resp:  [18] 18  BP: ()/(52-88) 121/88  SpO2:  [99 %-100 %] 100 %  Body mass index is 24 04 kg/m²  Input and Output Summary (last 24 hours): Intake/Output Summary (Last 24 hours) at 18 0850  Last data filed at 18 0201   Gross per 24 hour   Intake              630 ml   Output             1658 ml   Net            -1028 ml       Physical Exam:  General Appearance:    Alert, Chronically ill looking, cooperative, no distress, appropriately responsive    Head:    Normocephalic, without obvious abnormality, atraumatic, mucous membranes moist    Eyes:    Conjunctiva/corneas clear, EOM's intact   Neck:   Supple   Lungs:     Decreased at the bases     Heart:    Irregularly irregular, S1 and S2    Abdomen:     Soft, non-tender, bowel sounds active all four quadrants,     no masses, no organomegaly   Extremities:   Decreased ble edema +1, chronic stasis changes   Neurologic:  nonfocal         Additional Data:     Labs:      Results from last 7 days  Lab Units 18  0519  18  0242   WBC Thousand/uL 5 87  --  5 17   HEMOGLOBIN g/dL 11 9*  --  12 7   HEMATOCRIT % 39 2  --  41 4   PLATELETS Thousands/uL 108*  < > 116*   NEUTROS PCT %  --   --  63   LYMPHS PCT %  --   --  27   MONOS PCT %  --   --  10   EOS PCT %  --   --  1   < > = values in this interval not displayed      Results from last 7 days  Lab Units 18  0508  18  0243   SODIUM mmol/L 141  < > 142   POTASSIUM mmol/L 3 9  < > 4 1   CHLORIDE mmol/L 103  < > 104   CO2 mmol/L 32  < > 31   BUN mg/dL 35*  < > 30*   CREATININE mg/dL 1 02  < > 1 05   CALCIUM mg/dL 8 2*  < > 8 9   TOTAL PROTEIN g/dL  --   --  7 3   BILIRUBIN TOTAL mg/dL  --   --  0 50   ALK PHOS U/L  --   --  99   ALT U/L  --   --  37   AST U/L  --   --  37   GLUCOSE RANDOM mg/dL 82  < > 102   < > = values in this interval not displayed  Results from last 7 days  Lab Units 06/21/18  0243   INR  1 08       * I Have Reviewed All Lab Data Listed Above  * Additional Pertinent Lab Tests Reviewed: Dex 66 Admission Reviewed    Cultures:   Blood Culture:   Lab Results   Component Value Date    BLOODCX No Growth at 24 hrs  06/21/2018    BLOODCX No Growth at 48 hrs  06/21/2018     Urine Culture: No results found for: URINECX  Sputum Culture: No components found for: SPUTUMCX  Wound Culture: No results found for: WOUNDCULT    Last 24 Hours Medication List:     Current Facility-Administered Medications:  acetaminophen 650 mg Oral Q6H PRN Starmelisa Fletcher, PA-C   aspirin 81 mg Oral Daily Starlett Justine, PA-C   enoxaparin 40 mg Subcutaneous Daily Starlett Holster, PA-C   finasteride 5 mg Oral Daily Starlett Holster, PA-C   furosemide 40 mg Intravenous BID (diuretic) Eric Foley MD   lisinopril 5 mg Oral Daily Dutch Rowe MD   metoprolol tartrate 50 mg Oral Q12H Venancio Almazan MD   nitroglycerin 0 4 mg Sublingual Q5 Min PRN Myranda Fletcher, PA-C   ondansetron 4 mg Intravenous Q6H PRN Myranda Fletcher, PA-C        Today, Patient Was Seen By: Eric Foley MD    ** Please Note: Dragon 360 Dictation voice to text software may have been used in the creation of this document   **

## 2018-06-23 NOTE — ASSESSMENT & PLAN NOTE
· R>L bilateral pleural effusion likely secondary to CHF  · Continue IV diuretics as above  · Overall clinically improved

## 2018-06-23 NOTE — PROGRESS NOTES
Progress Note - Cardiology   Vinnie Rizvi 82 y o  male MRN: [id number]  Encounter: [id number]  06/23/18  11:06 AM        Assessment/Plan:    1  Acute/chronic systolic HF  He is on Lasix 40 IV b i d  He is urinating well with improvement in symptoms  Continue IV Lasix today, then likely can transition back to Lasix 40 mg daily starting 6/24  Recheck BMP in a m    Will check portable CXR today to ensure pleural effusions have improved/resolved  2    Nonischemic cardiomyopathy with an EF of 40%  He has been started on metoprolol tartrate 50 b i d , transition to Metoprolol succinate for discharge  His home lisinopril 2 5 mg daily has been resumed and titrated up  Currently tolerating current dosing, continue to monitor  3    Paroxysmal atrial fibrillation  He is on metoprolol  Heart rate is controlled on telemetry  He was not on anticoagulation prior to admission for unclear reasons  4    Hypertension  Blood pressure is controlled on current regimen  Monitor with titration of heart failure regimen  5  Type II NSTEMI  Abnormal troponin likely due to volume overload/acute on chronic systolic HF exacerbation  He was previously following with Dr Vaelrie Olson at [facility]  He reports he will continue to follow with her after discharge  Subjective/Objective   Chief Complaint:   Chief Complaint   Patient presents with    Shortness of Breath     Patient brought in by EMS for sob that has been ongoing on and off x 1 week  Patient woke up out of sleep tonight with sob  EMS also states that pt has some kind of "cellulitis on his lower extremities " Patient spo2 was in 70's at scene and was using accessory muscles to breathe           Subjective:   33-year-old man with a history of nonischemic cardiomyopathy with an ejection fraction of 99%, chronic systolic heart failure, hypertension, paroxysmal atrial fibrillation not on anticoagulation prior to admission, who was admitted with shortness of breath for last 3 weeks, found to have acute/chronic systolic HF  Chest x-ray showed right greater than left pleural effusions  CTA of the chest showed a moderate right and small to moderate left pleural effusion  He has a caretaker through [employer], who reports that multiple medications he was previously taking were discontinued approximately 4 months prior to admission, including Lasix, for unclear reasons  He is bedbound/wheelchair bound at baseline due to spinal stenosis  ECHO during this admission 6/18 shows EF 40% with mild diffuse hypokinesis, LVH, RV size at upper limits of normal with low normal systolic function  Mild aortic stenosis with mild aortic regurgitation  Moderate tricuspid regurgitation with the pulmonary artery systolic pressure of 02-21 mm Hg, consistent with moderate pulmonary HTN  He reports his breathing is better today  His lower extremity edema is improving  He reports he is urinating well  No fevers  No chest pain  No nausea or vomiting  He is bedbound at baseline, nonambulatory         Patient Active Problem List   Diagnosis    Paroxysmal A-fib ([address] )    Essential hypertension    Acute on chronic systolic congestive heart failure ([address] )    Cardiomyopathy ([address] )    Chronic venous insufficiency    Type 2 myocardial infarction ([address] )    Pleural effusion, bilateral     Past Medical History:   Diagnosis Date    Cardiomyopathy [facility])     with EF of 40 % 12/15    Cellulitis of lower extremity 6/21/2018    Cervical spinal stenosis     Cervical spondylosis     [facility] failure[facility])     Chronic venous stasis dermatitis of both lower extremities     DDD (degenerative disc disease), lumbar     Degenerative cervical disc     Discitis of lumbar region     Elevated troponin 6/21/2018    History of BPH     Hypertension     Lumbar canal stenosis     OA (osteoarthritis)     Osteomyelitis (HCC)     Paroxysmal A-fib ([address] )     Pneumonia of both lower lobes due to infectious organism ([facility] ) 6/21/2018    Spinal stenosis     Urinary retention        Allergies   Allergen Reactions    Morphine        Current Facility-Administered Medications   Medication Dose Route Frequency Provider Last Rate Last Dose    acetaminophen (TYLENOL) tablet 650 mg  650 mg Oral Q6H PRN Emaline Cage, PA-C        aspirin chewable tablet 81 mg  81 mg Oral Daily Emaline Cage, PA-C   81 mg at 06/22/18 0919    enoxaparin (LOVENOX) subcutaneous injection 40 mg  40 mg Subcutaneous Daily Emaline Cage, PA-C   40 mg at 06/22/18 8720    finasteride (PROSCAR) tablet 5 mg  5 mg Oral Daily Emaline Cage, PA-C   5 mg at 06/22/18 3252    furosemide (LASIX) injection 40 mg  40 mg Intravenous BID (diuretic) Jennifer Dougherty MD        lisinopril (ZESTRIL) tablet 5 mg  5 mg Oral Daily Valente Howard MD        metoprolol tartrate (LOPRESSOR) tablet 50 mg  50 mg Oral Q12H [address] Valente Howard MD   50 mg at 06/22/18 2236    nitroglycerin (NITROSTAT) SL tablet 0 4 mg  0 4 mg Sublingual Q5 Min PRN Emaline Cage, PA-C        ondansetron [facility]) injection 4 mg  4 mg Intravenous Q6H PRN Emaline Cage, PA-C           Vitals: /88 (BP Location: Left arm)   Pulse 79   Temp 98 2 °F (36 8 °C) (Axillary)   Resp 18   Ht 5' 10" (1 778 m)   Wt 76 kg (167 lb 8 8 oz)   SpO2 100%   BMI 24 04 kg/m²     Intake/Output Summary (Last 24 hours) at 06/23/18 1106  Last data filed at 06/23/18 1057   Gross per 24 hour   Intake              750 ml   Output             1158 ml   Net             -408 ml     Wt Readings from Last 3 Encounters:   06/23/18 76 kg (167 lb 8 8 oz)   01/03/17 68 5 kg (151 lb)   11/03/16 78 kg (172 lb)       Body mass index is 24 04 kg/m²  ,     Vitals:    06/22/18 1500 06/22/18 1840 06/22/18 2300 06/23/18 0801   BP: 92/52 98/62 100/70 121/88   Pulse: 83  89 79   Patient Position - Orthostatic VS: Lying Lying Lying Lying       Physical Exam:     GEN: Awake and alert, in no acute distress  HEENT: Sclera anicteric, conjunctivae pink, mucous membranes moist   NECK: Supple, no carotid bruits, JVD to 5 cm above sternal angle  HEART: Regular rhythm, normal S1 and S2, no murmurs, clicks, gallops or rubs  LUNGS: Clear anteriorly bilaterally; no wheezes, rales, or rhonchi   ABDOMEN: Soft, nontender, nondistended, normoactive bowel sounds  EXTREMITIES: Skin warm and well perfused, no clubbing, cyanosis, skin wrinkling bilaterally, without significant pitting edema, improving  NEURO: No focal findings  SKIN: Normal without suspicious lesions on exposed skin  Lab Results:     BMP:    Results from last 7 days  Lab Units 06/23/18  0508 06/21/18  2231 06/21/18  0243   SODIUM mmol/L 141 140 142   POTASSIUM mmol/L 3 9 4 1 4 1   CHLORIDE mmol/L 103 103 104   CO2 mmol/L 32 36* 31   BUN mg/dL 35* 31* 30*   CREATININE mg/dL 1 02 1 12 1 05   GLUCOSE RANDOM mg/dL 82 89 102   CALCIUM mg/dL 8 2* 8 9 8 9       CBC:     Results from last 7 days  Lab Units 06/22/18  0519 06/21/18  0903 06/21/18  0242   WBC Thousand/uL 5 87  --  5 17   HEMOGLOBIN g/dL 11 9*  --  12 7   HEMATOCRIT % 39 2  --  41 4   MCV fL 97  --  96   PLATELETS Thousands/uL 108* 112* 116*   MCH pg 29 5  --  29 4   MCHC g/dL 30 4*  --  30 7*   RDW % 15 5*  --  15 4*   MPV fL 13 0* 12 6 13 2*         Results from last 7 days  Lab Units 06/21/18  2231 06/21/18  1713 06/21/18  1348   TROPONIN I ng/mL 0 15* 0 16* 0 14*         Results from last 7 days  Lab Units 06/21/18  0243   NT-PRO BNP pg/mL 6,165*               INR:     Results from last 7 days  Lab Units 06/21/18  0243   INR  1 08       Lipid Profile:   No results found for: CHOL  No results found for: HDL  No results found for: LDLCALC  No results found for: TRIG      Hgb A1c:         Telemetry: personally reviewed, occasional NSVT (up to 4 beats), controlled HR        Cardiac testing:   Results for orders placed during the hospital encounter of 06/21/18   Echo complete with contrast if indicated    Narrative [facility], [address] [facility]  [phone]    Transthoracic Echocardiogram  2D, M-mode, Doppler, and Color Doppler    Study date:  2018    Patient: Haseeb Mak  MR number: [id number]  Account number: [de-identified]  : 1935  Age: 82 years  Gender: Male  Status: Inpatient  Location: Bedside  Height: 70 in  Weight: 176 lb  BP: 145/ 81 mmHg    Indications: Heart Failure    Diagnoses: I50 9 - Heart failure, unspecified    Sonographer:  LC Landry  Primary Physician:  Johnita Closs, DO  Referring Physician:  Vandana Washburn PA-C  [facility]  [facility]  Interpreting Physician:  Adina Loera MD    SUMMARY    LEFT VENTRICLE:  Systolic function was mildly reduced  Ejection fraction was estimated to be 40 %  There was mild diffuse hypokinesis  Wall thickness was increased  Concentric hypertrophy was present  RIGHT VENTRICLE:  The size was at the upper limits of normal   Systolic function was low normal     AORTIC VALVE:  The valve was trileaflet  Leaflets exhibited moderately increased thickness, moderate calcification, and moderately reduced cuspal separation  There was mild stenosis  There was mild regurgitation  TRICUSPID VALVE:  There was moderate regurgitation  Estimated peak PA pressure was 55 - 60 mmHg  The findings suggest moderate pulmonary hypertension  PERICARDIUM:  There was a left pleural effusion  HISTORY: PRIOR HISTORY: AFIB, Cardiomyopathy, Hypertension, CHF    PROCEDURE: The procedure was performed at the bedside  This was a routine study  The transthoracic approach was used  The study included complete 2D imaging, M-mode, complete spectral Doppler, and color Doppler  The heart rate was 92 bpm,  at the start of the study  Images were obtained from the parasternal, apical, subcostal, and suprasternal notch acoustic windows  Image quality was adequate      LEFT VENTRICLE: Size was normal  Systolic function was mildly reduced  Ejection fraction was estimated to be 40 %  There was mild diffuse hypokinesis  Wall thickness was increased  Concentric hypertrophy was present  RIGHT VENTRICLE: The size was at the upper limits of normal  Systolic function was low normal     LEFT ATRIUM: The atrium was dilated  RIGHT ATRIUM: The atrium was dilated  MITRAL VALVE: There was annular calcification  There was mild-moderate thickening  There was normal leaflet separation  There was systolic bowing, but without diagnostic evidence for prolapse  DOPPLER: There was no evidence for stenosis  There was mild to moderate regurgitation  AORTIC VALVE: The valve was trileaflet  Leaflets exhibited moderately increased thickness, moderate calcification, and moderately reduced cuspal separation  DOPPLER: There was mild stenosis  There was mild regurgitation  TRICUSPID VALVE: The valve structure was normal  There was normal leaflet separation  DOPPLER: There was no evidence for stenosis  There was moderate regurgitation  Estimated peak PA pressure was 55 - 60 mmHg  The findings suggest moderate  pulmonary hypertension  PULMONIC VALVE: Leaflets exhibited normal thickness, no calcification, and normal cuspal separation  DOPPLER: The transpulmonic velocity was within the normal range  There was mild regurgitation  PERICARDIUM: There was a left pleural effusion  AORTA: The root exhibited upper limit of normal size at 3 8 cm  SYSTEMIC VEINS: IVC: The inferior vena cava was dilated  The respirophasic change in diameter was less than 50%      SYSTEM MEASUREMENT TABLES    2D  %FS: 11 52 %  AV Diam: 3 32 cm  EDV(Teich): 126 97 ml  EF(Teich): 24 82 %  ESV(Teich): 95 45 ml  HR_4Ch_Q: 84 45 BPM  IVSd: 1 22 cm  LA Area: 32 95 cm2  LA Diam: 3 69 cm  LVCO_4Ch_Q: 1 86 L/min  LVEDV MOD A4C: 131 46 ml  LVEF MOD A4C: 49 93 %  LVEF_4Ch_Q: 25 61 %  LVESV MOD A4C: 65 82 ml  LVIDd: 5 16 cm  LVIDs: 4 56 cm  LVLd A4C: 7 96 cm  LVLd_4Ch_Q: 7 38 cm  LVLs A4C: 6 85 cm  LVLs_4Ch_Q: 6 67 cm  LVOT Diam: 2 24 cm  LVPWd: 1 3 cm  LVSV_4Ch_Q: 22 02 ml  LVVED_4Ch_Q: 85 98 ml  LVVES_4Ch_Q: 63 97 ml  RA Area: 24 87 cm2  RVIDd: 4 19 cm  SV MOD A4C: 65 64 ml  SV(Teich): 31 52 ml    CW  AR Dec [place]: 4 44 m/s2  AR Dec Time: 987 94 ms  AR PHT: 286 5 ms  AR Vmax: 4 38 m/s  AR maxP 96 mmHg  AV Env  Ti: 268 17 ms  AV MaxP 16 mmHg  AV SV: 374 94 ml  AV VTI: 43 45 cm  AV Vmax: 2 29 m/s  AV Vmean: 1 63 m/s  AV meanP 27 mmHg  TR MaxP 26 mmHg  TR Vmax: 3 51 m/s    MM  TAPSE: 1 69 cm    PW  GAUTAM (VTI): 0 99 cm2  GAUTAM Vmax: 0 91 cm2  LVOT Env  Ti: 293 43 ms  LVOT VTI: 10 93 cm  LVOT Vmax: 0 53 m/s  LVOT Vmean: 0 37 m/s  LVOT maxP 13 mmHg  LVOT meanP 65 mmHg  LVSV Dopp: 42 9 ml    [facility] Accredited Echocardiography [facility]    Prepared and electronically signed by    Angella Still MD  Signed 2018 13:19:55

## 2018-06-23 NOTE — ASSESSMENT & PLAN NOTE
· Bilateral LE edema/erythema is much improved from admission  · Continue diuretics as above, elevate extremity  · TEDS stockings on discharge

## 2018-06-23 NOTE — ASSESSMENT & PLAN NOTE
· Continue IV diuretics, sob improved but still clinically volume overload although much improved from admission  · He is -4L and weight down approx 10kg since admission   Renal function stable so far  · Echocardiogram revealed EF 40%, moderate pulmonary hypertension, mild to moderate mitral regurg, moderate TR  · Continue I's and O's, daily weights, 2 g sodium diet  · Monitor renal function closely while on IV diuretics  · Continue metoprolol, lisinopril

## 2018-06-24 NOTE — PHYSICAL THERAPY NOTE
PHYSICAL THERAPY EVALUATION  NAME:  Dexter Nolen  DATE: 06/24/18    AGE:   82 y o  Mrn:   [id number]  ADMIT DX:  Atrial fibrillation (HCC) [I48 91]  Shortness of breath [R06 02]  CHF (congestive heart failure) ([facility]) [I50 9]  Pneumonia [J18 9]  Elevated troponin [R74 8]  Raised TSH level [R94 6]  Cellulitis of both lower extremities [L03 115, L03 116]  Acute congestive heart failure, unspecified heart failure type ([address] ) [I50 9]    Past Medical History:   Diagnosis Date    Cardiomyopathy [facility])     with EF of 40 % 12/15    Cellulitis of lower extremity 6/21/2018    Cervical spinal stenosis     Cervical spondylosis     CHF (congestive heart failure) ([facility])     Chronic venous stasis dermatitis of both lower extremities     DDD (degenerative disc disease), lumbar     Degenerative cervical disc     Discitis of lumbar region     Elevated troponin 6/21/2018    History of BPH     Hypertension     Lumbar canal stenosis     OA (osteoarthritis)     Osteomyelitis ([facility])     Paroxysmal A-fib ([facility])     Pneumonia of both lower lobes due to infectious organism ([facility] ) 6/21/2018    Spinal stenosis     Urinary retention      Length Of Stay: 3  Performed at least 2 patient identifiers during session: Name and ID bracelet    PHYSICAL THERAPY EVALUATION :      06/24/18 0950   Note Type   Note type Eval/Treat   Pain Assessment   Pain Assessment No/denies pain   Home Living   Type of Home House  (bilevel)   Home Layout Two level;Ramped entrance  (bilevel)   Home Equipment Walker; Wheelchair-manual   Additional Comments was able to amb 45' prior to admission 1 day pta   Prior Function   Level of [place] Independent with ADLs and functional mobility; Needs assistance with IADLs   Lives With Other (Comment)  (24 hr care)   ADL Assistance Independent   IADLs Needs assistance   Falls in the last 6 months 0   Restrictions/Precautions   Braces or Orthoses (Pt reports his molded shoes are not here)   Other Precautions Bed Alarm;Chair Alarm; Fall Risk   General   Family/Caregiver Present No   Cognition   Overall Cognitive Status WFL   Arousal/Participation Alert   Orientation Level Oriented X4   Memory Within functional limits   Following Commands Follows one step commands with increased time or repetition   RUE Strength   RUE Overall Strength Deficits  (shoulder <3 flex/abd; arthritic changes)   LUE Strength   LUE Overall Strength Deficits  (shoulder tested to 3 flex/abd; arthritic changes)   RLE Overall PROM   R Ankle Dorsiflexion limited from neutral   Strength RLE   R Hip Flexion 4-/5   R Knee Extension 4-/5   R Ankle Dorsiflexion 3-/5   LLE Overall PROM   L Ankle Dorsiflexion limited from neutral   Strength LLE   L Hip Flexion 4-/5   L Knee Flexion 4-/5   L Knee Extension 3-/5   Coordination   Movements are Fluid and Coordinated 0   Coordination and Movement Description dysmetria, bradykinesia   Sensation X  ([place], has hearing aide and amplifier)   Light Touch   RLE Light Touch Absent   RLE Light Touch Comments toes/plantar aspect midfoot   LLE Light Touch Absent   LLE Light Touch Comments toes/plantar aspect midfoot   Bed Mobility   Supine to Sit 3  Moderate assistance   Additional items Assist x 1; Increased time required;Verbal cues;HOB elevated; Bedrails   Transfers   Sit to Stand 3  Moderate assistance  (using quick move, elevated height of bed)   Additional items Assist x 1; Increased time required;Verbal cues   Stand to Sit 4  Minimal assistance  (using quick move for UE support)   Additional items Assist x 1; Increased time required;Verbal cues;Armrests   Additional Comments Posture:, forward flexion, protracted shoulders, forward head      Ambulation/Elevation   Gait pattern Not tested  (due to limited standing tolerance)   Balance   Static Sitting Fair +   Static Standing Poor +  (standing tolernace 45 seconds)   Endurance Deficit   Endurance Deficit Yes   Endurance Deficit Description limited standing tolerance   Activity Tolerance   Activity Tolerance Patient limited by fatigue   Nurse Made Aware spoke to [facility] PCA   Assessment   Prognosis Fair   Problem List Decreased strength;Decreased range of motion;Decreased endurance; Impaired balance;Decreased mobility; Decreased coordination; Impaired hearing  (abnormal posture)   Goals   Patient Goals to be able to go home and manage, start adult coloring again when I feel better   STG Expiration Date 07/04/18   Treatment Day 1  (treatment documented in note)   Plan   Treatment/Interventions Functional transfer training;LE strengthening/ROM; Bed mobility;Gait training;Cognitive reorientation;Patient/family training;Equipment eval/education; Endurance training; Therapeutic exercise;Spoke to nursing   PT Frequency (3-5x/wk)   Recommendation   Recommendation Home PT  (and home with 24 hr caregivers)   Equipment Recommended Viola Flash; Wheelchair  (eventually, recommend quick move for OOB this session)   Barthel Index   Feeding 5   Bathing 0   Grooming Score 0   Dressing Score 0   Bladder Score 0   Bowels Score 10   Toilet Use Score 0   Transfers (Bed/Chair) Score 5   Mobility (Level Surface) Score 0   Stairs Score 0   Barthel Index Score 20   (Please find full objective findings from PT assessment regarding body systems outlined above)  Assessment: Pt is a 82 y o  male seen for PT evaluation s/p admit to [address] [place] [address] on 6/21/2018 w/ Acute on chronic systolic congestive heart failure ([facility] )  Order placed for PT  Prior to admission, pt lived in one floor environment, lived with 24 hr caregivers and used rolling walker in home and WC in/out of home for mobility  Upon evaluation: Pt requires mod assistance for bed mobilty and min-mod assistance for transfers    Pt's clinical presentation is currently unstable/unpredictable given the functional mobility deficits above, especially weakness, decreased ROM, decreased endurance, decreased functional mobility tolerance and limited posture, coupled with fall risks including impaired balance and impaired coordination, and combined with medical complications of abnormal CO2 values  Pt to benefit from continued skilled PT tx while in hospital and upon DC to address deficits as defined above and maximize level of functional mobility  From PT/mobility standpoint, recommendation at time of d/c would be Home PT and caregivers pending progress in order to maximize pt's functional independence and consistency w/ mobility in order to facilitate return to PLOF  Recommend trial with walker next 1-2 sessions, ther ex next 1-2 sessions and Recommend nursing continue to use quick move for OOB/chair transfers as appropriate  Goals: Pt will: Perform bed mobility tasks to min A to decrease burden of care  Perform transfers to Supervision to decrease burden of care  Perform ambulation with rolling walker for >50' to  Supervision  to increase Indep in home environment and decrease burden of care  Standing tolerance with BUE support for >5 min S to improve activity tolerance and improve ability to perform upright tasks at home, indep with HEP for LE strengthening to improve ease of transfers and improve activity tolerance  The following objective measures were performed on IE: Barthel Index 20/100  Comorbidities affecting pt's physical performance at time of assessment include: HTN, limited hearing, CHF, A fib and C And L spine stenosis, back and hip surgery, RTC repair and premorbid limitations  Personal factors affecting pt at time of IE include: advanced age, inability to perform IADLs and inability to navigate community distances  Shila Powers, PT, DPT  PHYSICAL THERAPY TREATMENT NOTE  Time In: 10:12  Time Out:10:21   Total Time: 9 min  S:  Pt agreeable to perform additional standing trials and postural therex  O:  Transfers with BUE support of quick move min A to S x 2 trials   Standing tolerance with intermittent min A for 1 min first trial and 3 min second trial  Pt able to perform  Scapular retraction therex while in standing without physical/tactile support  A:  Pt requires no physical assistance to perform exercises, intermittent (and less) A for transfers, and requires 25% verbal instructionto perform exercises with proper form and technique     P:  Recommend addition of therapeutic exercises to current functional mobility program and progression with PT to rolling walker for ambulation     Cha Kay, PT, DPT

## 2018-06-24 NOTE — PLAN OF CARE
Problem: PHYSICAL THERAPY ADULT  Goal: Performs mobility at highest level of function for planned discharge setting  See evaluation for individualized goals  Treatment/Interventions: Functional transfer training, LE strengthening/ROM, Bed mobility, Gait training, Cognitive reorientation, Patient/family training, Equipment eval/education, Endurance training, Therapeutic exercise, Spoke to nursing  Equipment Recommended: Christine Spanner, Wheelchair (eventually, recommend quick move for OOB this session)       See flowsheet documentation for full assessment, interventions and recommendations  Outcome: Progressing  Prognosis: Fair  Problem List: Decreased strength, Decreased range of motion, Decreased endurance, Impaired balance, Decreased mobility, Decreased coordination, Impaired hearing (abnormal posture)  Assessment: Pt requires no physical assistance to perform exercises, intermittent (and less) A for transfers, and requires 25% verbal instruction to perform exercises with proper form and technique  Recommendation: Home PT (and home with 24 hr caregivers)          See flowsheet documentation for full assessment

## 2018-06-24 NOTE — PROGRESS NOTES
Progress Note - Cardiology   Ethan Martinez 82 y o  male MRN: [id number]  Encounter: [id number]  06/24/18  9:33 AM        Assessment/Plan:    1  Acute/chronic systolic HF  He was treated with Lasix 40 IV b i d  CXR from 6/23 shows improvement in bilateral pleural effusions, but not completely resolved, personally reviewed  BUN/Cr starting to trend up  Would transition to Lasix 40 mg bid for 5 days, then decrease to 40 daily  2    Nonischemic cardiomyopathy with an EF of 40%  He has been started on metoprolol tartrate 50 b i d , transitioned to Metoprolol succinate for discharge  His home lisinopril 2 5 mg daily has been resumed and titrated up  Currently tolerating current dosing, continue to monitor  3    Paroxysmal atrial fibrillation  He is on metoprolol  Heart rate is controlled on telemetry  He was not on anticoagulation prior to admission for unclear reasons  4    Hypertension  Blood pressure is controlled on current regimen  Monitor with titration of heart failure regimen  5  Type II NSTEMI  Abnormal troponin likely due to volume overload/acute on chronic systolic HF exacerbation  He was previously following with Dr Charley Sanders at [facility]  He reports he will continue to follow with her after discharge  Subjective/Objective   Chief Complaint:   Chief Complaint   Patient presents with    Shortness of Breath     Patient brought in by EMS for sob that has been ongoing on and off x 1 week  Patient woke up out of sleep tonight with sob  EMS also states that pt has some kind of "cellulitis on his lower extremities " Patient spo2 was in 70's at scene and was using accessory muscles to breathe           Subjective:   51-year-old man with a history of nonischemic cardiomyopathy with an ejection fraction of 38%, chronic systolic heart failure, hypertension, paroxysmal atrial fibrillation not on anticoagulation prior to admission, who was admitted with shortness of breath for last 3 weeks, found to have acute/chronic systolic HF  Chest x-ray showed right greater than left pleural effusions  CTA of the chest showed a moderate right and small to moderate left pleural effusion  He has a caretaker through [employer], who reports that multiple medications he was previously taking were discontinued approximately 4 months prior to admission, including Lasix, for unclear reasons  He is bedbound/wheelchair bound at baseline due to spinal stenosis  ECHO during this admission 6/18 shows EF 40% with mild diffuse hypokinesis, LVH, RV size at upper limits of normal with low normal systolic function  Mild aortic stenosis with mild aortic regurgitation  Moderate tricuspid regurgitation with the pulmonary artery systolic pressure of 88-76 mm Hg, consistent with moderate pulmonary HTN  He denies any SOB today  His lower extremity edema is resolved  No fevers  No chest pain  No nausea or vomiting  He is bedbound at baseline, nonambulatory         Patient Active Problem List   Diagnosis    Paroxysmal A-fib ([address] )    Essential hypertension    Acute on chronic systolic congestive heart failure (HCC)    Cardiomyopathy ([address] )    Chronic venous insufficiency    Type 2 myocardial infarction ([address] )    Pleural effusion, bilateral     Past Medical History:   Diagnosis Date    Cardiomyopathy [facility])     with EF of 40 % 12/15    Cellulitis of lower extremity 6/21/2018    Cervical spinal stenosis     Cervical spondylosis     [facility]est[facility])     Chronic venous stasis dermatitis of both lower extremities     DDD (degenerative disc disease), lumbar     Degenerative cervical disc     Discitis of lumbar region     Elevated troponin 6/21/2018    History of BPH     Hypertension     Lumbar canal stenosis     OA (osteoarthritis)     Osteomyelitis (HCC)     Paroxysmal A-fib ([address] )     Pneumonia of both lower lobes due to infectious organism ([address] ) 6/21/2018    Spinal stenosis     Urinary retention        Allergies   Allergen Reactions    Morphine        Current Facility-Administered Medications   Medication Dose Route Frequency Provider Last Rate Last Dose    acetaminophen (TYLENOL) tablet 650 mg  650 mg Oral Q6H PRN Chiki Velazquez PA-C        aspirin chewable tablet 81 mg  81 mg Oral Daily DEVON EscalanteC   81 mg at 06/24/18 2608    enoxaparin (LOVENOX) subcutaneous injection 40 mg  40 mg Subcutaneous Daily Chiki Velazquez PA-C   40 mg at 06/24/18 8159    finasteride (PROSCAR) tablet 5 mg  5 mg Oral Daily Chiki Velazquez PA-C   5 mg at 06/24/18 3097    furosemide (LASIX) tablet 40 mg  40 mg Oral Daily Amado Hawley MD        lisinopril (ZESTRIL) tablet 10 mg  10 mg Oral Daily Guy Wells MD   10 mg at 06/24/18 7591    metoprolol succinate (TOPROL-XL) 24 hr tablet 50 mg  50 mg Oral BID Guy Wells MD   50 mg at 06/24/18 0829    nitroglycerin (NITROSTAT) SL tablet 0 4 mg  0 4 mg Sublingual Q5 Min PRN Chiki Velazquez PA-C        ondansetron [facility]) injection 4 mg  4 mg Intravenous Q6H PRN Chiki Velazquez PA-C           Vitals: /58 (BP Location: Right arm)   Pulse 73   Temp 97 5 °F (36 4 °C) (Oral)   Resp 20   Ht 5' 10" (1 778 m)   Wt 75 5 kg (166 lb 7 2 oz)   SpO2 95%   BMI 23 88 kg/m²     Intake/Output Summary (Last 24 hours) at 06/24/18 0933  Last data filed at 06/24/18 0700   Gross per 24 hour   Intake              490 ml   Output             1700 ml   Net            -1210 ml     Wt Readings from Last 3 Encounters:   06/24/18 75 5 kg (166 lb 7 2 oz)   01/03/17 68 5 kg (151 lb)   11/03/16 78 kg (172 lb)       Body mass index is 23 88 kg/m²  ,     Vitals:    06/23/18 1534 06/23/18 1955 06/23/18 2300 06/24/18 0700   BP: 109/55 95/56 112/70 117/58   Pulse: 83 74 80 73   Patient Position - Orthostatic VS:  Sitting Sitting Lying       Physical Exam:     GEN: Awake and alert, in no acute distress      HEENT: Sclera anicteric, conjunctivae pink, mucous membranes moist   NECK: Supple, no carotid bruits, JVD to 5 cm above sternal angle  HEART: Regular rhythm, normal S1 and S2, no murmurs, clicks, gallops or rubs  LUNGS: Clear anteriorly bilaterally; no wheezes, rales, or rhonchi   ABDOMEN: Soft, nontender, nondistended, normoactive bowel sounds  EXTREMITIES: Skin warm and well perfused, no clubbing, cyanosis, skin wrinkling bilaterally, without significant pitting edema, improved  NEURO: No focal findings  SKIN: Normal without suspicious lesions on exposed skin  Lab Results:     BMP:    Results from last 7 days  Lab Units 06/24/18  0507 06/23/18  0508 06/21/18  2231 06/21/18  0243   SODIUM mmol/L 142 141 140 142   POTASSIUM mmol/L 4 0 3 9 4 1 4 1   CHLORIDE mmol/L 102 103 103 104   CO2 mmol/L 35* 32 36* 31   BUN mg/dL 44* 35* 31* 30*   CREATININE mg/dL 1 16 1 02 1 12 1 05   GLUCOSE RANDOM mg/dL 85 82 89 102   CALCIUM mg/dL 8 7 8 2* 8 9 8 9       CBC:     Results from last 7 days  Lab Units 06/22/18  0519 06/21/18  0903 06/21/18  0242   WBC Thousand/uL 5 87  --  5 17   HEMOGLOBIN g/dL 11 9*  --  12 7   HEMATOCRIT % 39 2  --  41 4   MCV fL 97  --  96   PLATELETS Thousands/uL 108* 112* 116*   MCH pg 29 5  --  29 4   MCHC g/dL 30 4*  --  30 7*   RDW % 15 5*  --  15 4*   MPV fL 13 0* 12 6 13 2*         Results from last 7 days  Lab Units 06/21/18  2231 06/21/18  1713 06/21/18  1348   TROPONIN I ng/mL 0 15* 0 16* 0 14*         Results from last 7 days  Lab Units 06/21/18  0243   NT-PRO BNP pg/mL 6,165*               INR:     Results from last 7 days  Lab Units 06/21/18  0243   INR  1 08       Lipid Profile:   No results found for: CHOL  No results found for: HDL  No results found for: LDLCALC  No results found for: TRIG      Hgb A1c:         Telemetry: personally reviewed, SR with occasional PVCs, up to 3 beats NSVT         Cardiac testing:   Results for orders placed during the hospital encounter of 06/21/18   Echo complete with contrast if indicated    Narrative [facility] [address] [facility]  [phone]    Transthoracic Echocardiogram  2D, M-mode, Doppler, and Color Doppler    Study date:  2018    Patient: Miguel Dacosta  MR number: [id number]  Account number: [de-identified]  : [id number]  Age: 82 years  Gender: Male  Status: Inpatient  Location: Bedside  Height: 70 in  Weight: 176 lb  BP: 145/ 81 mmHg    Indications: Heart Failure    Diagnoses: I50 9 - Heart failure, unspecified    Sonographer:  LC Schmidt  Primary Physician:  Manuel Art DO  Referring Physician:  Gayatri Zambrano PA-C  Group:  [facility]  Interpreting Physician:  Jose Thakkar MD    SUMMARY    LEFT VENTRICLE:  Systolic function was mildly reduced  Ejection fraction was estimated to be 40 %  There was mild diffuse hypokinesis  Wall thickness was increased  Concentric hypertrophy was present  RIGHT VENTRICLE:  The size was at the upper limits of normal   Systolic function was low normal     AORTIC VALVE:  The valve was trileaflet  Leaflets exhibited moderately increased thickness, moderate calcification, and moderately reduced cuspal separation  There was mild stenosis  There was mild regurgitation  TRICUSPID VALVE:  There was moderate regurgitation  Estimated peak PA pressure was 55 - 60 mmHg  The findings suggest moderate pulmonary hypertension  PERICARDIUM:  There was a left pleural effusion  HISTORY: PRIOR HISTORY: AFIB, Cardiomyopathy, Hypertension, CHF    PROCEDURE: The procedure was performed at the bedside  This was a routine study  The transthoracic approach was used  The study included complete 2D imaging, M-mode, complete spectral Doppler, and color Doppler  The heart rate was 92 bpm,  at the start of the study  Images were obtained from the parasternal, apical, subcostal, and suprasternal notch acoustic windows  Image quality was adequate      LEFT VENTRICLE: Size was normal  Systolic function was mildly reduced  Ejection fraction was estimated to be 40 %  There was mild diffuse hypokinesis  Wall thickness was increased  Concentric hypertrophy was present  RIGHT VENTRICLE: The size was at the upper limits of normal  Systolic function was low normal     LEFT ATRIUM: The atrium was dilated  RIGHT ATRIUM: The atrium was dilated  MITRAL VALVE: There was annular calcification  There was mild-moderate thickening  There was normal leaflet separation  There was systolic bowing, but without diagnostic evidence for prolapse  DOPPLER: There was no evidence for stenosis  There was mild to moderate regurgitation  AORTIC VALVE: The valve was trileaflet  Leaflets exhibited moderately increased thickness, moderate calcification, and moderately reduced cuspal separation  DOPPLER: There was mild stenosis  There was mild regurgitation  TRICUSPID VALVE: The valve structure was normal  There was normal leaflet separation  DOPPLER: There was no evidence for stenosis  There was moderate regurgitation  Estimated peak PA pressure was 55 - 60 mmHg  The findings suggest moderate  pulmonary hypertension  PULMONIC VALVE: Leaflets exhibited normal thickness, no calcification, and normal cuspal separation  DOPPLER: The transpulmonic velocity was within the normal range  There was mild regurgitation  PERICARDIUM: There was a left pleural effusion  AORTA: The root exhibited upper limit of normal size at 3 8 cm  SYSTEMIC VEINS: IVC: The inferior vena cava was dilated  The respirophasic change in diameter was less than 50%      SYSTEM MEASUREMENT TABLES    2D  %FS: 11 52 %  AV Diam: 3 32 cm  EDV(Teich): 126 97 ml  EF(Teich): 24 82 %  ESV(Teich): 95 45 ml  HR_4Ch_Q: 84 45 BPM  IVSd: 1 22 cm  LA Area: 32 95 cm2  LA Diam: 3 69 cm  LVCO_4Ch_Q: 1 86 L/min  LVEDV MOD A4C: 131 46 ml  LVEF MOD A4C: 49 93 %  LVEF_4Ch_Q: 25 61 %  LVESV MOD A4C: 65 82 ml  LVIDd: 5 16 cm  LVIDs: 4 56 cm  LVLd A4C: 7 96 cm  LVLd_4Ch_Q: 7 38 cm  LVLs A4C: 6 85 cm  LVLs_4Ch_Q: 6 67 cm  LVOT Diam: 2 24 cm  LVPWd: 1 3 cm  LVSV_4Ch_Q: 22 02 ml  LVVED_4Ch_Q: 85 98 ml  LVVES_4Ch_Q: 63 97 ml  RA Area: 24 87 cm2  RVIDd: 4 19 cm  SV MOD A4C: 65 64 ml  SV(Teich): 31 52 ml    CW  AR Dec [place]: 4 44 m/s2  AR Dec Time: 987 94 ms  AR PHT: 286 5 ms  AR Vmax: 4 38 m/s  AR maxP 96 mmHg  AV Env  Ti: 268 17 ms  AV MaxP 16 mmHg  AV SV: 374 94 ml  AV VTI: 43 45 cm  AV Vmax: 2 29 m/s  AV Vmean: 1 63 m/s  AV meanP 27 mmHg  TR MaxP 26 mmHg  TR Vmax: 3 51 m/s    MM  TAPSE: 1 69 cm    PW  GAUTAM (VTI): 0 99 cm2  GAUTAM Vmax: 0 91 cm2  LVOT Env  Ti: 293 43 ms  LVOT VTI: 10 93 cm  LVOT Vmax: 0 53 m/s  LVOT Vmean: 0 37 m/s  LVOT maxP 13 mmHg  LVOT meanP 65 mmHg  LVSV Dopp: 42 9 ml    [employer] Accredited Echocardiography Laboratory    Prepared and electronically signed by    Nikkie Chaudhry MD  Signed 2018 13:19:55

## 2018-06-24 NOTE — ASSESSMENT & PLAN NOTE
· R>L bilateral pleural effusion likely secondary to CHF  · Chest x-ray from 6/23/18 showed "No significant interval change in the bilateral pleural effusions right greater than left and associated bibasal regions of airspace opacity"  · Patient however is overall clinically improved  · Continue b i d  diuretics p o  over the next 5 days as recommended per Cardiology

## 2018-06-24 NOTE — PLAN OF CARE
CARDIOVASCULAR - ADULT     Maintains optimal cardiac output and hemodynamic stability Progressing     Absence of cardiac dysrhythmias or at baseline rhythm Progressing        METABOLIC, FLUID AND ELECTROLYTES - ADULT     Electrolytes maintained within normal limits Progressing     Fluid balance maintained Progressing        PAIN - ADULT     Verbalizes/displays adequate comfort level or baseline comfort level Progressing        Potential for Falls     Patient will remain free of falls Progressing        Prexisting or High Potential for Compromised Skin Integrity     Skin integrity is maintained or improved Progressing        RESPIRATORY - ADULT     Achieves optimal ventilation and oxygenation Progressing        SAFETY ADULT     Patient will remain free of falls Progressing

## 2018-06-24 NOTE — PLAN OF CARE
Problem: PHYSICAL THERAPY ADULT  Goal: Performs mobility at highest level of function for planned discharge setting  See evaluation for individualized goals  Treatment/Interventions: Functional transfer training, LE strengthening/ROM, Bed mobility, Gait training, Cognitive reorientation, Patient/family training, Equipment eval/education, Endurance training, Therapeutic exercise, Spoke to nursing  Equipment Recommended: Harvey [place], Wheelchair (eventually, recommend quick move for OOB this session)       See flowsheet documentation for full assessment, interventions and recommendations  Prognosis: Fair  Problem List: Decreased strength, Decreased range of motion, Decreased endurance, Impaired balance, Decreased mobility, Decreased coordination, Impaired hearing (abnormal posture)  Assessment: Pt is a 82 y o  male seen for PT evaluation s/p admit to [address] [place] [address] on 6/21/2018 w/ Acute on chronic systolic congestive heart failure ([facility] [address] )  Order placed for PT  Prior to admission, pt lived in one floor environment, lived with 24 hr caregivers and used rolling walker in home and WC in/out of home for mobility  Upon evaluation: Pt requires mod assistance for bed mobilty and min-mod assistance for transfers  Pt's clinical presentation is currently unstable/unpredictable given the functional mobility deficits above, especially weakness, decreased ROM, decreased endurance, decreased functional mobility tolerance and limited posture, coupled with fall risks including impaired balance and impaired coordination, and combined with medical complications of abnormal CO2 values  Pt to benefit from continued skilled PT tx while in hospital and upon DC to address deficits as defined above and maximize level of functional mobility   From PT/mobility standpoint, recommendation at time of d/c would be Home PT and caregivers pending progress in order to maximize pt's functional independence and consistency w/ mobility in order to facilitate return to PLOF  Recommend trial with walker next 1-2 sessions, ther ex next 1-2 sessions and Recommend nursing continue to use quick move for OOB/chair transfers as appropriate  Recommendation: Home PT (and home with 24 hr caregivers)          See flowsheet documentation for full assessment

## 2018-06-24 NOTE — ASSESSMENT & PLAN NOTE
· Bilateral LE edema/erythema is mostly resolved  · Continue diuretics as above, elevate extremity  · TEDS stockings on discharge

## 2018-06-24 NOTE — PROGRESS NOTES
Progress Note Megha Hernandez 1935, 82 y o  male MRN: [id number]    Unit/Bed#: -01 Encounter: [id number]    Primary Care Provider: Jessica Jules DO   Date and time admitted to hospital: 6/21/2018  2:20 AM    * Acute on chronic systolic congestive heart failure ([address] )   Assessment & Plan    · Overall clinically improved  He is >5L negative since admission with approximately 11 kg weight loss  · Echocardiogram revealed EF 40%, moderate pulmonary hypertension, mild to moderate mitral regurg, moderate TR  · Renal function remains relatively stable  Transitioned to oral Lasix today  · Continue I's and O's, daily weights, 2 g sodium diet  · Continue metoprolol, lisinopril       Type 2 myocardial infarction [facility])   Assessment & Plan    · Likely NSTEMI type II secondary to CHF  · Continue management of CHF  Cardiology following      Paroxysmal A-fib [facility])   Assessment & Plan    · Rate controlled  Continue metoprolol  Not on anticoagulation prior to admission for unclear reasons      Pleural effusion, bilateral   Assessment & Plan    · R>L bilateral pleural effusion likely secondary to CHF  · Chest x-ray from 6/23/18 showed "No significant interval change in the bilateral pleural effusions right greater than left and associated bibasal regions of airspace opacity"  · Patient however is overall clinically improved  · Continue b i d  diuretics p o  over the next 5 days as recommended per Cardiology      Essential hypertension   Assessment & Plan    · Stable  Continue current medications      Chronic venous insufficiency   Assessment & Plan    · Bilateral LE edema/erythema is mostly resolved  · Continue diuretics as above, elevate extremity  · TEDS stockings on discharge        VTE Pharmacologic Prophylaxis:   Pharmacologic: Enoxaparin (Lovenox)  Mechanical VTE Prophylaxis in Place: Yes    Patient Centered Rounds: I have performed bedside rounds with nursing staff today      Discussions with Specialists or Other Care Team Provider:  Nursing    Education and Discussions with Family / Patient:  Patient    Current Length of Stay: 3 day(s)    Current Patient Status: Inpatient   Certification Statement: The patient will continue to require additional inpatient hospital stay due to Above diagnosis and care plan    Discharge Plan:  Anticipate home with PT tomorrow    Code Status: Level 1 - Full Code      Subjective:   No new complaints or acute overnight events  He feels overall improved  Denies chest pain or shortness of breath    Objective:     Vitals:   Temp (24hrs), Av 4 °F (36 9 °C), Min:97 5 °F (36 4 °C), Max:98 9 °F (37 2 °C)    HR:  [73-80] 76  Resp:  [18-20] 18  BP: ()/(56-70) 98/58  SpO2:  [95 %-98 %] 95 %  Body mass index is 23 88 kg/m²  Input and Output Summary (last 24 hours):        Intake/Output Summary (Last 24 hours) at 18 1720  Last data filed at 18 1701   Gross per 24 hour   Intake              210 ml   Output             1500 ml   Net            -1290 ml       Physical Exam:  General Appearance:    Alert, chronically ill-appearing, cooperative, no distress, appropriately responsive    Head:    Normocephalic, without obvious abnormality, atraumatic, mucous membranes moist    Eyes:    Conjunctiva/corneas clear, EOM's intact   Neck:   Supple   Lungs:     Decreased at the bases with few rales    Heart:    Irregularly irregular, S1 and S2    Abdomen:     Soft, non-tender, bowel sounds active all four quadrants,     no masses, no organomegaly   Extremities:   Mostly resolved bilateral lower extremity edema   Neurologic:  nonfocal         Additional Data:     Labs:      Results from last 7 days  Lab Units 18  0519  18  0242   WBC Thousand/uL 5 87  --  5 17   HEMOGLOBIN g/dL 11 9*  --  12 7   HEMATOCRIT % 39 2  --  41 4   PLATELETS Thousands/uL 108*  < > 116*   NEUTROS PCT %  --   --  63   LYMPHS PCT %  --   --  27   MONOS PCT %  --   --  10   EOS PCT %  --   --  1   < > = values in this interval not displayed  Results from last 7 days  Lab Units 06/24/18  0507  06/21/18  0243   SODIUM mmol/L 142  < > 142   POTASSIUM mmol/L 4 0  < > 4 1   CHLORIDE mmol/L 102  < > 104   CO2 mmol/L 35*  < > 31   BUN mg/dL 44*  < > 30*   CREATININE mg/dL 1 16  < > 1 05   CALCIUM mg/dL 8 7  < > 8 9   TOTAL PROTEIN g/dL  --   --  7 3   BILIRUBIN TOTAL mg/dL  --   --  0 50   ALK PHOS U/L  --   --  99   ALT U/L  --   --  37   AST U/L  --   --  37   GLUCOSE RANDOM mg/dL 85  < > 102   < > = values in this interval not displayed  Results from last 7 days  Lab Units 06/21/18  0243   INR  1 08       * I Have Reviewed All Lab Data Listed Above  * Additional Pertinent Lab Tests Reviewed: Dex 66 Admission Reviewed    Cultures:   Blood Culture:   Lab Results   Component Value Date    BLOODCX No Growth at 72 hrs  06/21/2018    BLOODCX No Growth at 72 hrs  06/21/2018     Urine Culture: No results found for: URINECX  Sputum Culture: No components found for: SPUTUMCX  Wound Culture: No results found for: WOUNDCULT    Last 24 Hours Medication List:     Current Facility-Administered Medications:  acetaminophen 650 mg Oral Q6H PRN Eriberto Abu, PA-C   aspirin 81 mg Oral Daily Eriberto Abu, PA-C   enoxaparin 40 mg Subcutaneous Daily Eriberto Abu, PA-C   finasteride 5 mg Oral Daily Eriberto Abu, PA-C   furosemide 40 mg Oral BID (diuretic) Buck Haro MD   lisinopril 10 mg Oral Daily Buck Haro MD   metoprolol succinate 50 mg Oral BID Buck Haro MD   nitroglycerin 0 4 mg Sublingual Q5 Min PRN Eriberto Abu, PA-C   ondansetron 4 mg Intravenous Q6H PRN Eriberto Abu, PA-C        Today, Patient Was Seen By: Jaquan Preciado MD    ** Please Note: Dragon 360 Dictation voice to text software may have been used in the creation of this document   **

## 2018-06-24 NOTE — ASSESSMENT & PLAN NOTE
· Overall clinically improved  He is >5L negative since admission with approximately 11 kg weight loss  · Echocardiogram revealed EF 40%, moderate pulmonary hypertension, mild to moderate mitral regurg, moderate TR  · Renal function remains relatively stable    Transitioned to oral Lasix today  · Continue I's and O's, daily weights, 2 g sodium diet  · Continue metoprolol, lisinopril

## 2018-06-25 NOTE — CASE MANAGEMENT
Notification of Discharge  This is a Notification of Discharge from our facility [address]  Please be advised that this patient has been discharge from our facility  Below you will find the admission and discharge date and time including the patients disposition  PRESENTATION DATE: 6/21/2018  2:20 AM  IP ADMISSION DATE: 6/21/18 0547  DISCHARGE DATE: 6/25/2018  3:55 PM  DISPOSITION: Home with [address] [place] [address] in the [facility] by Rohith Randall for 2017  Network Utilization Review Department  Phone: [phone]; Fax [phone]  ATTENTION: The Network Utilization Review Department is now centralized for our 7 Facilities  Make a note that we have a new phone and fax numbers for our Department  Please call with any questions or concerns to [phone] and carefully follow the prompts so that you are directed to the right person  All voicemails are confidential  Fax any determinations, approvals, denials, and requests for initial or continue stay review clinical to [phone]  Due to HIGH CALL volume, it would be easier if you could please send faxed requests to expedite your requests and in part, help us provide discharge notifications faster        Alec Brasher MD Physician Addendum Hospitalist  Discharge Summaries Date of Service: 6/25/2018  1:41 PM         []Hide copied text     Discharge Summary - [facility]     Patient Information: Dai Kern 82 y o  male MRN: [id number]  Unit/Bed#: -01 Encounter: [id number]     Discharging Physician / Practitioner: Alec Brasher MD  PCP: Viridiana Adams DO  Admission Date: 6/21/2018  Discharge Date: 06/25/18     Reason for Admission:  Shortness of breath     Discharge Diagnoses:      Principal Problem:    Acute on chronic systolic congestive heart failure ([address] )  Active Problems:    Type 2 myocardial infarction ([address] )    Paroxysmal A-fib (HCC)    Pleural effusion, bilateral    Essential hypertension    Chronic venous insufficiency     Consultations During Hospital Stay:  · Cardiology     Procedures Performed:   · None     Significant findings:  · Echocardiogram - LVEF 40%, mild diffuse hypokinesis, moderate pulmonary hypertension  · Chest x-ray - Cardiomegaly  Right greater than left pleural effusions  · CTA chest - Moderate right and small-to-moderate left pleural effusions with bilateral basilar consolidations     Hospital Course:   Sunny Boland is a 82 y o  male patient who originally presented to the hospital on 6/21/2018 due to shortness of breath  He was diagnosed with acute on chronic systolic CHF and started on IV diuretics with Lasix  He was seen by the cardiology team and had echocardiogram done with results as indicated above  He did have positive troponins which was determined to be secondary to acute CHF  He achieved significant diuresis during his hospital course losing more than 5 L fluid and 11kg in weight  His symptoms improved significantly and he was cleared for discharge home on 6/25/18 with PT services  He was initially planned to continue 5 more days of b i d  Lasix, however due to significant fluid and weight loss, he was transition to once daily Lasix  He is to follow up with the cardiologist and his primary care physician and is to have a repeat BMP done in approximately 3-5 days      Condition at Discharge: stable      Discharge Day Visit / Exam:      Subjective:  Reported some mild lightheadedness while seated out of bed in chair for prolonged period yesterday    None since      Vitals: Blood Pressure: 116/67 (06/25/18 0747)  Pulse: 68 (06/25/18 0747)  Temperature: 97 9 °F (36 6 °C) (06/25/18 0747)  Temp Source: Oral (06/25/18 0747)  Respirations: 20 (06/25/18 0747)  Height: 5' 10" (177 8 cm) (06/21/18 0730)  Weight - Scale: 74 7 kg (164 lb 10 9 oz) (06/25/18 0452)  SpO2: 98 % (06/25/18 0747)     General Appearance:    Alert, chronically ill-appearing, cooperative, no distress, appropriately responsive    Head:    Normocephalic, without obvious abnormality, atraumatic, mucous membranes moist    Eyes:    Conjunctiva/corneas clear, EOM's intact   Neck:   Supple   Lungs:     Decreased at the bases, few rales     Heart:    Irregularly irregular, S1 and S2    Abdomen:     Soft, non-tender, bowel sounds active all four quadrants,     no masses, no organomegaly   Extremities:   Mostly resolved bilateral lower extremity edema   Neurologic:  nonfocal      Discharge instructions/Information to patient and family:   See after visit summary for information provided to patient and family        Provisions for Follow-Up Care:  See after visit summary for information related to follow-up care and any pertinent home health orders        Disposition: Home      Updated patient's grandson/SANDOR Vega on phone  All questions answered and concerns addressed      Discharge Statement:  I spent >30 minutes discharging the patient  This time was spent on the day of discharge  I had direct contact with the patient on the day of discharge  Greater than 50% of the total time was spent examining patient, answering all patient questions, arranging and discussing plan of care with patient as well as directly providing post-discharge instructions  Additional time then spent on discharge activities      Discharge Medications:  See after visit summary for reconciled discharge medications provided to patient and family        ** Please Note: Dragon 360 Dictation voice to text software may have been used in the creation of this document   **

## 2018-06-25 NOTE — DISCHARGE INSTRUCTIONS
Follow-up with your primary care physician for a repeat BMP in 3-5 days    Heart Failure   WHAT YOU NEED TO KNOW:   Heart failure (HF) is a condition that does not allow your heart to fill or pump properly  Not enough oxygen in your blood gets to your organs and tissues  HF can occur in the right side, the left side, or both lower chambers of your heart  HF is often caused by damage or injury to your heart  The damage may be caused by heart attack, other heart conditions, or high blood pressure  HF is a long-term condition that tends to get worse over time  It is important to manage your health to improve your quality of life  HF can be worsened by heavy alcohol use, smoking, diabetes that is not controlled, or obesity  DISCHARGE INSTRUCTIONS:   Call 911 if:   · You have any of the following signs of a heart attack:      ¨ Squeezing, pressure, or pain in your chest that lasts longer than 5 minutes or returns    ¨ Discomfort or pain in your back, neck, jaw, stomach, or arm     ¨ Trouble breathing    ¨ Nausea or vomiting    ¨ Lightheadedness or a sudden cold sweat, especially with chest pain or trouble breathing    Seek care immediately if:   · You gain 3 or more pounds (1 4 kg) in a day, or more than your healthcare provider says you should  · Your heartbeat is fast, slow, or uneven all the time  Contact your healthcare provider if:   · You have symptoms of worsening HF:      ¨ Shortness of breath at rest, at night, or that is getting worse in any way     ¨ Weight gain of 5 or more pounds (2 2 kg) in a week     ¨ More swelling in your legs or ankles     ¨ Abdominal pain or swelling     ¨ More coughing     ¨ Loss of appetite     ¨ Feeling tired all the time    · You feel hopeless or depressed, or you have lost interest in things you used to enjoy  · You often feel worried or afraid  · You have questions or concerns about your condition or care  Medicines:   You may  need any of the following:  · Medicines  may be given to help regulate your heart rhythm  You may also need medicines to lower your blood pressure, and to get rid of extra fluids  · Take your medicine as directed  Contact your healthcare provider if you think your medicine is not helping or if you have side effects  Tell him or her if you are allergic to any medicine  Keep a list of the medicines, vitamins, and herbs you take  Include the amounts, and when and why you take them  Bring the list or the pill bottles to follow-up visits  Carry your medicine list with you in case of an emergency  Follow up with your healthcare provider or cardiologist as directed: You may need to return for other tests  You may need home health care  A healthcare provider will monitor your vital signs, weight, and make sure your medicines are working  Write down your questions so you remember to ask them during your visits  Go to cardiac rehab as directed:  Cardiac rehab is a program run by specialists who will help you safely strengthen your heart  The program includes exercise, relaxation, stress management, and heart-healthy nutrition  Healthcare providers will also make sure your medicines are helping to reduce your symptoms  Manage your HF:  · Do not smoke  Nicotine and other chemicals in cigarettes and cigars can cause lung damage and make HF difficult to manage  Ask your healthcare provider for information if you currently smoke and need help to quit  E-cigarettes or smokeless tobacco still contain nicotine  Talk to your healthcare provider before you use these products  · Do not drink alcohol or take illegal drugs  Alcohol and drugs can worsen your symptoms quickly  · Weigh yourself every morning  Use the same scale, in the same spot  Do this after you use the bathroom, but before you eat or drink anything  Wear the same type of clothing  Do not wear shoes   Record your weight each day so you will notice any sudden weight gain  Swelling and weight gain are signs of fluid retention  If you are overweight, ask how to lose weight safely  · Check your blood pressure and heart rate every day  Ask for more information about how to measure your blood pressure and heart rate correctly  Ask what these numbers should be for you  · Manage any chronic health conditions you have  These include high blood pressure, diabetes, obesity, high cholesterol, metabolic syndrome, and COPD  You will have fewer symptoms if you manage these health conditions  Follow your healthcare provider's recommendations and follow up with him or her regularly  · Eat heart-healthy foods and limit sodium (salt)  An easy way to do this is to eat more fresh fruits and vegetables and fewer canned and processed foods  Replace butter and margarine with heart-healthy oils such as olive oil and canola oil  Other heart-healthy foods include walnuts, whole-grain breads, low-fat dairy products, beans, and lean meats  Fatty fish such as salmon and tuna are also heart healthy  Ask how much salt you can eat each day  Do not use salt substitutes  · Drink liquids as directed  You may need to limit the amount of liquids you drink if you retain fluid  Ask how much liquid to drink each day and which liquids are best for you  · Stay active  If you are not active, your symptoms are likely to worsen quickly  Walking, bicycling, and other types of physical activity help maintain your strength and improve your mood  Physical activity also helps you manage your weight  Work with your healthcare provider to create an exercise plan that is right for you  · Get vaccines as directed  Get a flu shot every year  You may also need the pneumonia vaccine  The flu and pneumonia can be severe for a person who has HF  Vaccines protect you from these infections  Join a support group:  Living with HF can be difficult  It may be helpful to talk with others who have HF   You may learn how to better manage your condition or get emotional support  For more information:   · [address] , [place]   Phone: 9- 783 - 978-9581  Web Address: https://www strong com/  org   © 2017 [employer] Information is for End User's use only and may not be sold, redistributed or otherwise used for commercial purposes  All illustrations and images included in CareNotes® are the copyrighted property of [employer]  or Rohith Randall  The above information is an  only  It is not intended as medical advice for individual conditions or treatments  Talk to your doctor, nurse or pharmacist before following any medical regimen to see if it is safe and effective for you

## 2018-06-25 NOTE — PLAN OF CARE
CARDIOVASCULAR - ADULT     Maintains optimal cardiac output and hemodynamic stability Adequate for Discharge     Absence of cardiac dysrhythmias or at baseline rhythm Adequate for Discharge        METABOLIC, FLUID AND ELECTROLYTES - ADULT     Electrolytes maintained within normal limits Adequate for Discharge     Fluid balance maintained Adequate for Discharge        PAIN - ADULT     Verbalizes/displays adequate comfort level or baseline comfort level Adequate for Discharge        Potential for Falls     Patient will remain free of falls Adequate for Discharge        Prexisting or High Potential for Compromised Skin Integrity     Skin integrity is maintained or improved Adequate for Discharge        RESPIRATORY - ADULT     Achieves optimal ventilation and oxygenation Adequate for Discharge        SAFETY ADULT     Patient will remain free of falls Adequate for Discharge

## 2018-06-25 NOTE — PROGRESS NOTES
General Cardiology   Progress Note -  Team One   Daquan Coleman 82 y o  male MRN: [id number]    Unit/Bed#: -01 Encounter: [id number]        Subjective: pt seen for follow up, no events overnight  He is feeling well this AM, not experiencing any SOB, chest pain or palpitations  LE swelling significantly improved  He has mild lightheadedness yesterday when he got out of bed but resolved quickly  Review of Systems   Constitution: Negative for decreased appetite, fever and weakness  Cardiovascular: Negative for chest pain, dyspnea on exertion and leg swelling  Respiratory: Negative for cough, shortness of breath and wheezing  Gastrointestinal: Negative for abdominal pain, nausea and vomiting  Genitourinary: Negative for dysuria  Neurological: Negative for dizziness and light-headedness  Psychiatric/Behavioral: Negative for altered mental status  All other systems reviewed and are negative  Objective:   Vitals: Blood pressure 116/67, pulse 68, temperature 97 9 °F (36 6 °C), temperature source Oral, resp  rate 20, height 5' 10" (1 778 m), weight 74 7 kg (164 lb 10 9 oz), SpO2 98 %  ,     Body mass index is 23 63 kg/m²  ,     Systolic (54XUP), TXW:70 , Min:78 , PGT:031     Diastolic (13WLU), ZS, Min:50, Max:67      Intake/Output Summary (Last 24 hours) at 18 1033  Last data filed at 18 0747   Gross per 24 hour   Intake              240 ml   Output              700 ml   Net             -460 ml     Weight (last 2 days)     Date/Time   Weight    18 0452  74 7 (164 68)    18 0600  75 5 (166 45)    18 0552  76 (167 55)            Telemetry Review: No significant arrhythmias seen on telemetry review  Physical Exam   Constitutional: He is oriented to person, place, and time  No distress  Patient is sitting up in bed in NAD alert pleasant cooperative   HENT:   Head: Normocephalic and atraumatic  Neck: No JVD present     Cardiovascular: Normal rate, S1 normal and S2 normal   An irregular rhythm present  No murmur heard  No lower extremity edema   Pulmonary/Chest: Effort normal and breath sounds normal  No respiratory distress  He has no wheezes  He has no rales  On room air, no respiratory distress or crackles   Abdominal: Soft  Neurological: He is alert and oriented to person, place, and time  Skin: He is not diaphoretic  Psychiatric: He has a normal mood and affect  His behavior is normal    Nursing note and vitals reviewed      LABORATORY RESULTS    Results from last 7 days  Lab Units 06/21/18  2231 06/21/18  1713 06/21/18  1348   TROPONIN I ng/mL 0 15* 0 16* 0 14*     CBC with diff:   Results from last 7 days  Lab Units 06/22/18  0519 06/21/18  0903 06/21/18  0242   WBC Thousand/uL 5 87  --  5 17   HEMOGLOBIN g/dL 11 9*  --  12 7   HEMATOCRIT % 39 2  --  41 4   MCV fL 97  --  96   PLATELETS Thousands/uL 108* 112* 116*   MCH pg 29 5  --  29 4   MCHC g/dL 30 4*  --  30 7*   RDW % 15 5*  --  15 4*   MPV fL 13 0* 12 6 13 2*     CMP:  Results from last 7 days  Lab Units 06/25/18  0452 06/24/18  0507 06/23/18  0508 06/21/18  2231 06/21/18  0243   SODIUM mmol/L 141 142 141 140 142   POTASSIUM mmol/L 4 0 4 0 3 9 4 1 4 1   CHLORIDE mmol/L 102 102 103 103 104   CO2 mmol/L 36* 35* 32 36* 31   ANION GAP mmol/L 3* 5 6 1* 7   BUN mg/dL 47* 44* 35* 31* 30*   CREATININE mg/dL 1 24 1 16 1 02 1 12 1 05   GLUCOSE RANDOM mg/dL 82 85 82 89 102   CALCIUM mg/dL 8 3 8 7 8 2* 8 9 8 9   AST U/L  --   --   --   --  37   ALT U/L  --   --   --   --  37   ALK PHOS U/L  --   --   --   --  99   TOTAL PROTEIN g/dL  --   --   --   --  7 3   BILIRUBIN TOTAL mg/dL  --   --   --   --  0 50   EGFR ml/min/1 73sq m 54 59 69 61 66     BMP:  Results from last 7 days  Lab Units 06/25/18  0452 06/24/18  0507 06/23/18  0508 06/21/18  2231 06/21/18  0243   SODIUM mmol/L 141 142 141 140 142   POTASSIUM mmol/L 4 0 4 0 3 9 4 1 4 1   CHLORIDE mmol/L 102 102 103 103 104   CO2 mmol/L 36* 35* 32 36* 31   BUN mg/dL 47* 44* 35* 31* 30*   CREATININE mg/dL 1 24 1 16 1 02 1 12 1 05   GLUCOSE RANDOM mg/dL 82 85 82 89 102   CALCIUM mg/dL 8 3 8 7 8 2* 8 9 8 9     Lab Results   Component Value Date    NTBNP 6,165 (H) 2018         Results from last 7 days  Lab Units 18  0242   TSH 3RD GENERATON uIU/mL 4 494*       Results from last 7 days  Lab Units 18  0243   INR  1 08     Cardiac testing:   Results for orders placed during the hospital encounter of 18   Echo complete with contrast if indicated    Narrative [facility] [address] [facility]  [phone]    Transthoracic Echocardiogram  2D, M-mode, Doppler, and Color Doppler    Study date:  2018    Patient: Rashard Acosta  MR number: [id number]  Account number: [de-identified]  : 1935  Age: 82 years  Gender: Male  Status: Inpatient  Location: Bedside  Height: 70 in  Weight: 176 lb  BP: 145/ 81 mmHg    Indications: Heart Failure    Diagnoses: I50 9 - Heart failure, unspecified    Sonographer:  LC Galvan  Primary Physician:  Rosy Castro DO  Referring Physician:  Cathy Lester PA-C  Group:  [facility]  Interpreting Physician:  Mumtaz Patel MD    SUMMARY    LEFT VENTRICLE:  Systolic function was mildly reduced  Ejection fraction was estimated to be 40 %  There was mild diffuse hypokinesis  Wall thickness was increased  Concentric hypertrophy was present  RIGHT VENTRICLE:  The size was at the upper limits of normal   Systolic function was low normal     AORTIC VALVE:  The valve was trileaflet  Leaflets exhibited moderately increased thickness, moderate calcification, and moderately reduced cuspal separation  There was mild stenosis  There was mild regurgitation  TRICUSPID VALVE:  There was moderate regurgitation  Estimated peak PA pressure was 55 - 60 mmHg  The findings suggest moderate pulmonary hypertension      PERICARDIUM:  There was a left pleural effusion  HISTORY: PRIOR HISTORY: AFIB, Cardiomyopathy, Hypertension, CHF    PROCEDURE: The procedure was performed at the bedside  This was a routine study  The transthoracic approach was used  The study included complete 2D imaging, M-mode, complete spectral Doppler, and color Doppler  The heart rate was 92 bpm,  at the start of the study  Images were obtained from the parasternal, apical, subcostal, and suprasternal notch acoustic windows  Image quality was adequate  LEFT VENTRICLE: Size was normal  Systolic function was mildly reduced  Ejection fraction was estimated to be 40 %  There was mild diffuse hypokinesis  Wall thickness was increased  Concentric hypertrophy was present  RIGHT VENTRICLE: The size was at the upper limits of normal  Systolic function was low normal     LEFT ATRIUM: The atrium was dilated  RIGHT ATRIUM: The atrium was dilated  MITRAL VALVE: There was annular calcification  There was mild-moderate thickening  There was normal leaflet separation  There was systolic bowing, but without diagnostic evidence for prolapse  DOPPLER: There was no evidence for stenosis  There was mild to moderate regurgitation  AORTIC VALVE: The valve was trileaflet  Leaflets exhibited moderately increased thickness, moderate calcification, and moderately reduced cuspal separation  DOPPLER: There was mild stenosis  There was mild regurgitation  TRICUSPID VALVE: The valve structure was normal  There was normal leaflet separation  DOPPLER: There was no evidence for stenosis  There was moderate regurgitation  Estimated peak PA pressure was 55 - 60 mmHg  The findings suggest moderate  pulmonary hypertension  PULMONIC VALVE: Leaflets exhibited normal thickness, no calcification, and normal cuspal separation  DOPPLER: The transpulmonic velocity was within the normal range  There was mild regurgitation  PERICARDIUM: There was a left pleural effusion      AORTA: The root exhibited upper limit of normal size at 3 8 cm  SYSTEMIC VEINS: IVC: The inferior vena cava was dilated  The respirophasic change in diameter was less than 50%  SYSTEM MEASUREMENT TABLES    2D  %FS: 11 52 %  AV Diam: 3 32 cm  EDV(Teich): 126 97 ml  EF(Teich): 24 82 %  ESV(Teich): 95 45 ml  HR_4Ch_Q: 84 45 BPM  IVSd: 1 22 cm  LA Area: 32 95 cm2  LA Diam: 3 69 cm  LVCO_4Ch_Q: 1 86 L/min  LVEDV MOD A4C: 131 46 ml  LVEF MOD A4C: 49 93 %  LVEF_4Ch_Q: 25 61 %  LVESV MOD A4C: 65 82 ml  LVIDd: 5 16 cm  LVIDs: 4 56 cm  LVLd A4C: 7 96 cm  LVLd_4Ch_Q: 7 38 cm  LVLs A4C: 6 85 cm  LVLs_4Ch_Q: 6 67 cm  LVOT Diam: 2 24 cm  LVPWd: 1 3 cm  LVSV_4Ch_Q: 22 02 ml  LVVED_4Ch_Q: 85 98 ml  LVVES_4Ch_Q: 63 97 ml  RA Area: 24 87 cm2  RVIDd: 4 19 cm  SV MOD A4C: 65 64 ml  SV(Teich): 31 52 ml    CW  AR Dec Warrick: 4 44 m/s2  AR Dec Time: 987 94 ms  AR PHT: 286 5 ms  AR Vmax: 4 38 m/s  AR maxP 96 mmHg  AV Env  Ti: 268 17 ms  AV MaxP 16 mmHg  AV SV: 374 94 ml  AV VTI: 43 45 cm  AV Vmax: 2 29 m/s  AV Vmean: 1 63 m/s  AV meanP 27 mmHg  TR MaxP 26 mmHg  TR Vmax: 3 51 m/s    MM  TAPSE: 1 69 cm    PW  GAUTAM (VTI): 0 99 cm2  GAUTAM Vmax: 0 91 cm2  LVOT Env  Ti: 293 43 ms  LVOT VTI: 10 93 cm  LVOT Vmax: 0 53 m/s  LVOT Vmean: 0 37 m/s  LVOT maxP 13 mmHg  LVOT meanP 65 mmHg  LVSV Dopp: 42 9 ml    [employer] Accredited Echocardiography Laboratory    Prepared and electronically signed by    Mumtaz Patel MD  Signed 2018 13:19:55       Meds/Allergies   current meds:   Current Facility-Administered Medications   Medication Dose Route Frequency    acetaminophen (TYLENOL) tablet 650 mg  650 mg Oral Q6H PRN    aspirin chewable tablet 81 mg  81 mg Oral Daily    enoxaparin (LOVENOX) subcutaneous injection 40 mg  40 mg Subcutaneous Daily    finasteride (PROSCAR) tablet 5 mg  5 mg Oral Daily    furosemide (LASIX) tablet 40 mg  40 mg Oral BID (diuretic)    lisinopril (ZESTRIL) tablet 10 mg  10 mg Oral Daily    metoprolol succinate (TOPROL-XL) 24 hr tablet 50 mg  50 mg Oral BID    nitroglycerin (NITROSTAT) SL tablet 0 4 mg  0 4 mg Sublingual Q5 Min PRN    ondansetron (ZOFRAN) injection 4 mg  4 mg Intravenous Q6H PRN     Prescriptions Prior to Admission   Medication    bifidobacterium infantis (ALIGN) capsule    diltiazem (CARDIZEM) 90 mg tablet    finasteride (PROSCAR) 5 mg tablet    lisinopril (ZESTRIL) 2 5 mg tablet     Assessment:  Principal Problem:    Acute on chronic systolic congestive heart failure (HCC)  Active Problems:    Paroxysmal A-fib (HCC)    Essential hypertension    Chronic venous insufficiency    Type 2 myocardial infarction (HCC)    Pleural effusion, bilateral    Assessment/ Plan:    Acute on chronic systolic CHF:  Patient had been taken off of some of his medications few months ago including his Lasix  He presented in acute CHF with bilateral pleural effusions; treated with IV Lasix; he is now on 40 mg p  O  b i d  for total of 5 days been planning to decrease to 40 mg daily  He now appears well compensated on exam   Repeat chest x-ray on 06/23 showed improvement but not resolution of bilateral pleural effusions  Continue current oral diuretics, creatinine stable 1 2  Nonischemic cardiomyopathy:  LVEF 40%  Continue aspirin, metoprolol and lisinopril  He was mildly hypotensive with systolic blood pressure in the 80s yesterday; otherwise tolerating his heart failure medications  Paroxysmal atrial fibrillation:  Rate controlled on metoprolol  He was not anticoagulated prior to admission    NSTEMI type 2:  Secondary to volume overload/acute CHF exacerbation  On aspirin, beta-blocker    Counseling / Coordination of Care  Total floor / unit time spent today 30 minutes  Greater than 50% of total time was spent with the patient and / or family counseling and / or coordination of care  ** Please Note: Dragon 360 Dictation voice to text software may have been used in the creation of this document   **

## 2018-06-25 NOTE — DISCHARGE SUMMARY
Discharge Summary - [facility]    Patient Information: Krissy Barron 82 y o  male MRN: [id number]  Unit/Bed#: -01 Encounter: [id number]    Discharging Physician / Practitioner: David Webster MD  PCP: Anushka Mcdowell DO  Admission Date: 6/21/2018  Discharge Date: 06/25/18    Reason for Admission:  Shortness of breath    Discharge Diagnoses:     Principal Problem:    Acute on chronic systolic congestive heart failure ([facility] [address] )  Active Problems:    Type 2 myocardial infarction ([facility] [address] )    Paroxysmal A-fib (HCC)    Pleural effusion, bilateral    Essential hypertension    Chronic venous insufficiency    Consultations During Hospital Stay:  · Cardiology    Procedures Performed:   · None    Significant findings:  · Echocardiogram - LVEF 40%, mild diffuse hypokinesis, moderate pulmonary hypertension  · Chest x-ray - Cardiomegaly  Right greater than left pleural effusions  · CTA chest - Moderate right and small-to-moderate left pleural effusions with bilateral basilar consolidations    Hospital Course:   Krissy Barron is a 82 y o  male patient who originally presented to the hospital on 6/21/2018 due to shortness of breath  He was diagnosed with acute on chronic systolic CHF and started on IV diuretics with Lasix  He was seen by the cardiology team and had echocardiogram done with results as indicated above  He did have positive troponins which was determined to be secondary to acute CHF  He achieved significant diuresis during his hospital course losing more than 5 L fluid and 11kg in weight  His symptoms improved significantly and he was cleared for discharge home on 6/25/18 with PT services  He was initially planned to continue 5 more days of b i d  Lasix, however due to significant fluid and weight loss, he was transition to once daily Lasix  He is to follow up with the cardiologist and his primary care physician and is to have a repeat BMP done in approximately 3-5 days      Condition at Discharge: stable     Discharge Day Visit / Exam:     Subjective:  Reported some mild lightheadedness while seated out of bed in chair for prolonged period yesterday  None since  Vitals: Blood Pressure: 116/67 (06/25/18 0747)  Pulse: 68 (06/25/18 0747)  Temperature: 97 9 °F (36 6 °C) (06/25/18 0747)  Temp Source: Oral (06/25/18 0747)  Respirations: 20 (06/25/18 0747)  Height: 5' 10" (177 8 cm) (06/21/18 0730)  Weight - Scale: 74 7 kg (164 lb 10 9 oz) (06/25/18 0452)  SpO2: 98 % (06/25/18 0747)    General Appearance:    Alert, chronically ill-appearing, cooperative, no distress, appropriately responsive    Head:    Normocephalic, without obvious abnormality, atraumatic, mucous membranes moist    Eyes:    Conjunctiva/corneas clear, EOM's intact   Neck:   Supple   Lungs:     Decreased at the bases, few rales     Heart:    Irregularly irregular, S1 and S2    Abdomen:     Soft, non-tender, bowel sounds active all four quadrants,     no masses, no organomegaly   Extremities:   Mostly resolved bilateral lower extremity edema   Neurologic:  nonfocal      Discharge instructions/Information to patient and family:   See after visit summary for information provided to patient and family  Provisions for Follow-Up Care:  See after visit summary for information related to follow-up care and any pertinent home health orders  Disposition: Home     Updated patient's grandson/SANDOR Wlison on phone  All questions answered and concerns addressed  Discharge Statement:  I spent >30 minutes discharging the patient  This time was spent on the day of discharge  I had direct contact with the patient on the day of discharge  Greater than 50% of the total time was spent examining patient, answering all patient questions, arranging and discussing plan of care with patient as well as directly providing post-discharge instructions  Additional time then spent on discharge activities      Discharge Medications:  See after visit summary for reconciled discharge medications provided to patient and family  ** Please Note: Dragon 360 Dictation voice to text software may have been used in the creation of this document   **

## 2019-01-01 ENCOUNTER — APPOINTMENT (INPATIENT)
Dept: ULTRASOUND IMAGING | Facility: HOSPITAL | Age: 84
DRG: 871 | End: 2019-01-01
Payer: MEDICARE

## 2019-01-01 ENCOUNTER — HOSPITAL ENCOUNTER (INPATIENT)
Facility: HOSPITAL | Age: 84
LOS: 9 days | DRG: 871 | End: 2019-04-15
Attending: EMERGENCY MEDICINE | Admitting: HOSPITALIST
Payer: MEDICARE

## 2019-01-01 ENCOUNTER — APPOINTMENT (INPATIENT)
Dept: RADIOLOGY | Facility: HOSPITAL | Age: 84
DRG: 871 | End: 2019-01-01
Payer: MEDICARE

## 2019-01-01 ENCOUNTER — HOSPITAL ENCOUNTER (INPATIENT)
Facility: HOSPITAL | Age: 84
LOS: 4 days | Discharge: NURSING, REHAB OR LONG-TERM CARE | DRG: 683 | End: 2019-01-22
Attending: EMERGENCY MEDICINE | Admitting: INTERNAL MEDICINE
Payer: MEDICARE

## 2019-01-01 ENCOUNTER — PATIENT OUTREACH (OUTPATIENT)
Dept: CARDIOLOGY CLINIC | Facility: CLINIC | Age: 84
End: 2019-01-01

## 2019-01-01 ENCOUNTER — HOSPITAL ENCOUNTER (INPATIENT)
Facility: HOSPITAL | Age: 84
LOS: 5 days | Discharge: NURSING, REHAB OR LONG-TERM CARE | DRG: 280 | End: 2019-02-14
Attending: EMERGENCY MEDICINE | Admitting: INTERNAL MEDICINE
Payer: MEDICARE

## 2019-01-01 ENCOUNTER — APPOINTMENT (INPATIENT)
Dept: NON INVASIVE DIAGNOSTICS | Facility: HOSPITAL | Age: 84
DRG: 871 | End: 2019-01-01
Payer: MEDICARE

## 2019-01-01 ENCOUNTER — APPOINTMENT (INPATIENT)
Dept: RADIOLOGY | Facility: HOSPITAL | Age: 84
DRG: 280 | End: 2019-01-01
Payer: MEDICARE

## 2019-01-01 ENCOUNTER — PATIENT OUTREACH (OUTPATIENT)
Dept: CARDIOLOGY UNIT | Facility: HOSPITAL | Age: 84
End: 2019-01-01

## 2019-01-01 ENCOUNTER — APPOINTMENT (INPATIENT)
Dept: NON INVASIVE DIAGNOSTICS | Facility: HOSPITAL | Age: 84
DRG: 280 | End: 2019-01-01
Payer: MEDICARE

## 2019-01-01 ENCOUNTER — APPOINTMENT (INPATIENT)
Dept: ULTRASOUND IMAGING | Facility: HOSPITAL | Age: 84
DRG: 683 | End: 2019-01-01
Payer: MEDICARE

## 2019-01-01 ENCOUNTER — OFFICE VISIT (OUTPATIENT)
Dept: CARDIOLOGY CLINIC | Facility: CLINIC | Age: 84
End: 2019-01-01
Payer: MEDICARE

## 2019-01-01 ENCOUNTER — APPOINTMENT (EMERGENCY)
Dept: RADIOLOGY | Facility: HOSPITAL | Age: 84
DRG: 280 | End: 2019-01-01
Payer: MEDICARE

## 2019-01-01 VITALS
HEIGHT: 70 IN | BODY MASS INDEX: 25.31 KG/M2 | OXYGEN SATURATION: 97 % | HEART RATE: 96 BPM | DIASTOLIC BLOOD PRESSURE: 64 MMHG | WEIGHT: 176.81 LBS | TEMPERATURE: 97.5 F | SYSTOLIC BLOOD PRESSURE: 111 MMHG | RESPIRATION RATE: 18 BRPM

## 2019-01-01 VITALS
DIASTOLIC BLOOD PRESSURE: 66 MMHG | HEART RATE: 96 BPM | OXYGEN SATURATION: 96 % | TEMPERATURE: 97.7 F | SYSTOLIC BLOOD PRESSURE: 106 MMHG | HEIGHT: 70 IN | WEIGHT: 190.2 LBS | BODY MASS INDEX: 27.23 KG/M2 | RESPIRATION RATE: 18 BRPM

## 2019-01-01 VITALS
TEMPERATURE: 97.3 F | SYSTOLIC BLOOD PRESSURE: 90 MMHG | RESPIRATION RATE: 18 BRPM | DIASTOLIC BLOOD PRESSURE: 50 MMHG | HEART RATE: 104 BPM | BODY MASS INDEX: 28.94 KG/M2 | OXYGEN SATURATION: 98 % | WEIGHT: 201.72 LBS

## 2019-01-01 VITALS — DIASTOLIC BLOOD PRESSURE: 62 MMHG | HEART RATE: 68 BPM | SYSTOLIC BLOOD PRESSURE: 98 MMHG

## 2019-01-01 DIAGNOSIS — R60.0 LOWER LEG EDEMA: ICD-10-CM

## 2019-01-01 DIAGNOSIS — L08.9 WOUND INFECTION: Primary | ICD-10-CM

## 2019-01-01 DIAGNOSIS — T14.8XXA WOUND INFECTION: Primary | ICD-10-CM

## 2019-01-01 DIAGNOSIS — R65.10 SIRS (SYSTEMIC INFLAMMATORY RESPONSE SYNDROME) (HCC): ICD-10-CM

## 2019-01-01 DIAGNOSIS — N17.9 ACUTE KIDNEY INJURY (HCC): Primary | ICD-10-CM

## 2019-01-01 DIAGNOSIS — I50.9 CHF EXACERBATION (HCC): ICD-10-CM

## 2019-01-01 DIAGNOSIS — I42.9 CARDIOMYOPATHY (HCC): ICD-10-CM

## 2019-01-01 DIAGNOSIS — R77.8 ELEVATED TROPONIN: ICD-10-CM

## 2019-01-01 DIAGNOSIS — T68.XXXA HYPOTHERMIA, INITIAL ENCOUNTER: ICD-10-CM

## 2019-01-01 DIAGNOSIS — I48.92 ATRIAL FLUTTER BY ELECTROCARDIOGRAM (HCC): ICD-10-CM

## 2019-01-01 DIAGNOSIS — E86.0 DEHYDRATION: ICD-10-CM

## 2019-01-01 DIAGNOSIS — J90 PLEURAL EFFUSION, RIGHT: ICD-10-CM

## 2019-01-01 DIAGNOSIS — I50.22 CHRONIC SYSTOLIC CONGESTIVE HEART FAILURE (HCC): ICD-10-CM

## 2019-01-01 DIAGNOSIS — S91.301A WOUND OF RIGHT FOOT: ICD-10-CM

## 2019-01-01 DIAGNOSIS — I95.9 HYPOTENSION: ICD-10-CM

## 2019-01-01 DIAGNOSIS — I10 HTN (HYPERTENSION), BENIGN: ICD-10-CM

## 2019-01-01 DIAGNOSIS — I48.19 PERSISTENT ATRIAL FIBRILLATION (HCC): Primary | ICD-10-CM

## 2019-01-01 DIAGNOSIS — I50.33 ACUTE ON CHRONIC DIASTOLIC CHF (CONGESTIVE HEART FAILURE) (HCC): ICD-10-CM

## 2019-01-01 DIAGNOSIS — I50.32 CHRONIC DIASTOLIC CHF (CONGESTIVE HEART FAILURE), NYHA CLASS 3 (HCC): ICD-10-CM

## 2019-01-01 DIAGNOSIS — I50.23 ACUTE ON CHRONIC SYSTOLIC HEART FAILURE (HCC): Primary | ICD-10-CM

## 2019-01-01 DIAGNOSIS — R26.2 AMBULATORY DYSFUNCTION: ICD-10-CM

## 2019-01-01 DIAGNOSIS — E87.5 HYPERKALEMIA: ICD-10-CM

## 2019-01-01 DIAGNOSIS — K59.00 CONSTIPATION: ICD-10-CM

## 2019-01-01 DIAGNOSIS — I87.2 CHRONIC VENOUS INSUFFICIENCY: ICD-10-CM

## 2019-01-01 DIAGNOSIS — N17.9 ACUTE KIDNEY INJURY (HCC): ICD-10-CM

## 2019-01-01 DIAGNOSIS — I73.9 PERIPHERAL VASCULAR DISEASE OF LOWER EXTREMITY (HCC): ICD-10-CM

## 2019-01-01 LAB
ALBUMIN SERPL BCP-MCNC: 2.8 G/DL (ref 3.5–5)
ALBUMIN SERPL BCP-MCNC: 2.8 G/DL (ref 3.5–5)
ALBUMIN SERPL BCP-MCNC: 2.9 G/DL (ref 3.5–5)
ALBUMIN SERPL BCP-MCNC: 3 G/DL (ref 3.5–5)
ALBUMIN SERPL BCP-MCNC: 3.2 G/DL (ref 3.5–5)
ALP SERPL-CCNC: 105 U/L (ref 46–116)
ALP SERPL-CCNC: 112 U/L (ref 46–116)
ALP SERPL-CCNC: 113 U/L (ref 46–116)
ALP SERPL-CCNC: 121 U/L (ref 46–116)
ALT SERPL W P-5'-P-CCNC: 20 U/L (ref 12–78)
ALT SERPL W P-5'-P-CCNC: 27 U/L (ref 12–78)
ALT SERPL W P-5'-P-CCNC: 31 U/L (ref 12–78)
ALT SERPL W P-5'-P-CCNC: 46 U/L (ref 12–78)
ANION GAP SERPL CALCULATED.3IONS-SCNC: 1 MMOL/L (ref 4–13)
ANION GAP SERPL CALCULATED.3IONS-SCNC: 10 MMOL/L (ref 4–13)
ANION GAP SERPL CALCULATED.3IONS-SCNC: 4 MMOL/L (ref 4–13)
ANION GAP SERPL CALCULATED.3IONS-SCNC: 5 MMOL/L (ref 4–13)
ANION GAP SERPL CALCULATED.3IONS-SCNC: 6 MMOL/L (ref 4–13)
ANION GAP SERPL CALCULATED.3IONS-SCNC: 7 MMOL/L (ref 4–13)
ANION GAP SERPL CALCULATED.3IONS-SCNC: 8 MMOL/L (ref 4–13)
ANION GAP SERPL CALCULATED.3IONS-SCNC: 9 MMOL/L (ref 4–13)
ANION GAP SERPL CALCULATED.3IONS-SCNC: 9 MMOL/L (ref 4–13)
APTT PPP: 106 SECONDS (ref 26–38)
APTT PPP: 128 SECONDS (ref 26–38)
APTT PPP: 28 SECONDS (ref 26–38)
APTT PPP: 42 SECONDS (ref 26–38)
ARTERIAL PATENCY WRIST A: YES
AST SERPL W P-5'-P-CCNC: 29 U/L (ref 5–45)
AST SERPL W P-5'-P-CCNC: 38 U/L (ref 5–45)
AST SERPL W P-5'-P-CCNC: 40 U/L (ref 5–45)
AST SERPL W P-5'-P-CCNC: 42 U/L (ref 5–45)
ATRIAL RATE: 288 BPM
ATRIAL RATE: 416 BPM
BACTERIA BLD CULT: NORMAL
BACTERIA BLD CULT: NORMAL
BACTERIA UR QL AUTO: ABNORMAL /HPF
BASE EXCESS BLDA CALC-SCNC: -0.5 MMOL/L
BASE EXCESS BLDA CALC-SCNC: 0 MMOL/L (ref -2–3)
BASOPHILS # BLD AUTO: 0.01 THOUSANDS/ΜL (ref 0–0.1)
BASOPHILS # BLD AUTO: 0.02 THOUSANDS/ΜL (ref 0–0.1)
BASOPHILS # BLD AUTO: 0.03 THOUSANDS/ΜL (ref 0–0.1)
BASOPHILS NFR BLD AUTO: 0 % (ref 0–1)
BASOPHILS NFR BLD AUTO: 1 % (ref 0–1)
BILIRUB SERPL-MCNC: 0.6 MG/DL (ref 0.2–1)
BILIRUB SERPL-MCNC: 0.6 MG/DL (ref 0.2–1)
BILIRUB SERPL-MCNC: 0.7 MG/DL (ref 0.2–1)
BILIRUB SERPL-MCNC: 0.77 MG/DL (ref 0.2–1)
BILIRUB UR QL STRIP: NEGATIVE
BUN SERPL-MCNC: 103 MG/DL (ref 5–25)
BUN SERPL-MCNC: 111 MG/DL (ref 5–25)
BUN SERPL-MCNC: 114 MG/DL (ref 5–25)
BUN SERPL-MCNC: 116 MG/DL (ref 5–25)
BUN SERPL-MCNC: 121 MG/DL (ref 5–25)
BUN SERPL-MCNC: 47 MG/DL (ref 5–25)
BUN SERPL-MCNC: 48 MG/DL (ref 5–25)
BUN SERPL-MCNC: 49 MG/DL (ref 5–25)
BUN SERPL-MCNC: 50 MG/DL (ref 5–25)
BUN SERPL-MCNC: 51 MG/DL (ref 5–25)
BUN SERPL-MCNC: 52 MG/DL (ref 5–25)
BUN SERPL-MCNC: 67 MG/DL (ref 5–25)
BUN SERPL-MCNC: 73 MG/DL (ref 5–25)
BUN SERPL-MCNC: 74 MG/DL (ref 5–25)
BUN SERPL-MCNC: 74 MG/DL (ref 5–25)
BUN SERPL-MCNC: 77 MG/DL (ref 5–25)
BUN SERPL-MCNC: 78 MG/DL (ref 5–25)
BUN SERPL-MCNC: 82 MG/DL (ref 5–25)
BUN SERPL-MCNC: 87 MG/DL (ref 5–25)
BUN SERPL-MCNC: 90 MG/DL (ref 5–25)
CA-I BLD-SCNC: 1.29 MMOL/L (ref 1.12–1.32)
CALCIUM SERPL-MCNC: 8.5 MG/DL (ref 8.3–10.1)
CALCIUM SERPL-MCNC: 8.7 MG/DL (ref 8.3–10.1)
CALCIUM SERPL-MCNC: 8.8 MG/DL (ref 8.3–10.1)
CALCIUM SERPL-MCNC: 9 MG/DL (ref 8.3–10.1)
CALCIUM SERPL-MCNC: 9 MG/DL (ref 8.3–10.1)
CALCIUM SERPL-MCNC: 9.1 MG/DL (ref 8.3–10.1)
CALCIUM SERPL-MCNC: 9.1 MG/DL (ref 8.3–10.1)
CALCIUM SERPL-MCNC: 9.2 MG/DL (ref 8.3–10.1)
CALCIUM SERPL-MCNC: 9.3 MG/DL (ref 8.3–10.1)
CALCIUM SERPL-MCNC: 9.3 MG/DL (ref 8.3–10.1)
CALCIUM SERPL-MCNC: 9.4 MG/DL (ref 8.3–10.1)
CALCIUM SERPL-MCNC: 9.6 MG/DL (ref 8.3–10.1)
CHLORIDE SERPL-SCNC: 104 MMOL/L (ref 100–108)
CHLORIDE SERPL-SCNC: 104 MMOL/L (ref 100–108)
CHLORIDE SERPL-SCNC: 106 MMOL/L (ref 100–108)
CHLORIDE SERPL-SCNC: 106 MMOL/L (ref 100–108)
CHLORIDE SERPL-SCNC: 108 MMOL/L (ref 100–108)
CHLORIDE SERPL-SCNC: 108 MMOL/L (ref 100–108)
CHLORIDE SERPL-SCNC: 109 MMOL/L (ref 100–108)
CHLORIDE SERPL-SCNC: 110 MMOL/L (ref 100–108)
CHLORIDE SERPL-SCNC: 111 MMOL/L (ref 100–108)
CHLORIDE SERPL-SCNC: 112 MMOL/L (ref 100–108)
CHLORIDE SERPL-SCNC: 93 MMOL/L (ref 100–108)
CHLORIDE SERPL-SCNC: 94 MMOL/L (ref 100–108)
CHLORIDE SERPL-SCNC: 95 MMOL/L (ref 100–108)
CHLORIDE SERPL-SCNC: 98 MMOL/L (ref 100–108)
CHLORIDE SERPL-SCNC: 98 MMOL/L (ref 100–108)
CHLORIDE SERPL-SCNC: 99 MMOL/L (ref 100–108)
CK SERPL-CCNC: 57 U/L (ref 39–308)
CLARITY UR: CLEAR
CO2 SERPL-SCNC: 24 MMOL/L (ref 21–32)
CO2 SERPL-SCNC: 26 MMOL/L (ref 21–32)
CO2 SERPL-SCNC: 27 MMOL/L (ref 21–32)
CO2 SERPL-SCNC: 28 MMOL/L (ref 21–32)
CO2 SERPL-SCNC: 28 MMOL/L (ref 21–32)
CO2 SERPL-SCNC: 29 MMOL/L (ref 21–32)
CO2 SERPL-SCNC: 29 MMOL/L (ref 21–32)
CO2 SERPL-SCNC: 30 MMOL/L (ref 21–32)
CO2 SERPL-SCNC: 31 MMOL/L (ref 21–32)
CO2 SERPL-SCNC: 31 MMOL/L (ref 21–32)
CO2 SERPL-SCNC: 32 MMOL/L (ref 21–32)
CO2 SERPL-SCNC: 38 MMOL/L (ref 21–32)
CO2 SERPL-SCNC: 39 MMOL/L (ref 21–32)
CO2 SERPL-SCNC: 39 MMOL/L (ref 21–32)
CO2 SERPL-SCNC: 40 MMOL/L (ref 21–32)
CO2 SERPL-SCNC: 41 MMOL/L (ref 21–32)
CO2 SERPL-SCNC: 42 MMOL/L (ref 21–32)
COLOR UR: YELLOW
CORTIS SERPL-MCNC: 20.4 UG/DL
CREAT SERPL-MCNC: 1.24 MG/DL (ref 0.6–1.3)
CREAT SERPL-MCNC: 1.25 MG/DL (ref 0.6–1.3)
CREAT SERPL-MCNC: 1.27 MG/DL (ref 0.6–1.3)
CREAT SERPL-MCNC: 1.3 MG/DL (ref 0.6–1.3)
CREAT SERPL-MCNC: 1.32 MG/DL (ref 0.6–1.3)
CREAT SERPL-MCNC: 1.35 MG/DL (ref 0.6–1.3)
CREAT SERPL-MCNC: 1.58 MG/DL (ref 0.6–1.3)
CREAT SERPL-MCNC: 1.79 MG/DL (ref 0.6–1.3)
CREAT SERPL-MCNC: 1.8 MG/DL (ref 0.6–1.3)
CREAT SERPL-MCNC: 1.81 MG/DL (ref 0.6–1.3)
CREAT SERPL-MCNC: 1.84 MG/DL (ref 0.6–1.3)
CREAT SERPL-MCNC: 1.84 MG/DL (ref 0.6–1.3)
CREAT SERPL-MCNC: 1.87 MG/DL (ref 0.6–1.3)
CREAT SERPL-MCNC: 1.92 MG/DL (ref 0.6–1.3)
CREAT SERPL-MCNC: 1.94 MG/DL (ref 0.6–1.3)
CREAT SERPL-MCNC: 1.99 MG/DL (ref 0.6–1.3)
CREAT SERPL-MCNC: 2.14 MG/DL (ref 0.6–1.3)
CREAT SERPL-MCNC: 2.19 MG/DL (ref 0.6–1.3)
CREAT SERPL-MCNC: 2.25 MG/DL (ref 0.6–1.3)
CREAT SERPL-MCNC: 2.28 MG/DL (ref 0.6–1.3)
CREAT SERPL-MCNC: 2.33 MG/DL (ref 0.6–1.3)
CREAT SERPL-MCNC: 2.63 MG/DL (ref 0.6–1.3)
CRP SERPL QL: 7.8 MG/L
EOSINOPHIL # BLD AUTO: 0.01 THOUSAND/ΜL (ref 0–0.61)
EOSINOPHIL # BLD AUTO: 0.01 THOUSAND/ΜL (ref 0–0.61)
EOSINOPHIL # BLD AUTO: 0.02 THOUSAND/ΜL (ref 0–0.61)
EOSINOPHIL # BLD AUTO: 0.03 THOUSAND/ΜL (ref 0–0.61)
EOSINOPHIL NFR BLD AUTO: 0 % (ref 0–6)
EOSINOPHIL NFR BLD AUTO: 1 % (ref 0–6)
ERYTHROCYTE [DISTWIDTH] IN BLOOD BY AUTOMATED COUNT: 16.6 % (ref 11.6–15.1)
ERYTHROCYTE [DISTWIDTH] IN BLOOD BY AUTOMATED COUNT: 16.7 % (ref 11.6–15.1)
ERYTHROCYTE [DISTWIDTH] IN BLOOD BY AUTOMATED COUNT: 16.8 % (ref 11.6–15.1)
ERYTHROCYTE [DISTWIDTH] IN BLOOD BY AUTOMATED COUNT: 17.2 % (ref 11.6–15.1)
ERYTHROCYTE [DISTWIDTH] IN BLOOD BY AUTOMATED COUNT: 17.8 % (ref 11.6–15.1)
ERYTHROCYTE [DISTWIDTH] IN BLOOD BY AUTOMATED COUNT: 18 % (ref 11.6–15.1)
ERYTHROCYTE [DISTWIDTH] IN BLOOD BY AUTOMATED COUNT: 18.1 % (ref 11.6–15.1)
ERYTHROCYTE [DISTWIDTH] IN BLOOD BY AUTOMATED COUNT: 18.1 % (ref 11.6–15.1)
ERYTHROCYTE [DISTWIDTH] IN BLOOD BY AUTOMATED COUNT: 18.2 % (ref 11.6–15.1)
ERYTHROCYTE [DISTWIDTH] IN BLOOD BY AUTOMATED COUNT: 18.2 % (ref 11.6–15.1)
ERYTHROCYTE [DISTWIDTH] IN BLOOD BY AUTOMATED COUNT: 18.3 % (ref 11.6–15.1)
ERYTHROCYTE [DISTWIDTH] IN BLOOD BY AUTOMATED COUNT: 18.5 % (ref 11.6–15.1)
ERYTHROCYTE [SEDIMENTATION RATE] IN BLOOD: 23 MM/HOUR (ref 0–10)
FOLATE SERPL-MCNC: 17.5 NG/ML (ref 3.1–17.5)
GFR SERPL CREATININE-BSD FRML MDRD: 22 ML/MIN/1.73SQ M
GFR SERPL CREATININE-BSD FRML MDRD: 25 ML/MIN/1.73SQ M
GFR SERPL CREATININE-BSD FRML MDRD: 26 ML/MIN/1.73SQ M
GFR SERPL CREATININE-BSD FRML MDRD: 26 ML/MIN/1.73SQ M
GFR SERPL CREATININE-BSD FRML MDRD: 27 ML/MIN/1.73SQ M
GFR SERPL CREATININE-BSD FRML MDRD: 28 ML/MIN/1.73SQ M
GFR SERPL CREATININE-BSD FRML MDRD: 31 ML/MIN/1.73SQ M
GFR SERPL CREATININE-BSD FRML MDRD: 32 ML/MIN/1.73SQ M
GFR SERPL CREATININE-BSD FRML MDRD: 32 ML/MIN/1.73SQ M
GFR SERPL CREATININE-BSD FRML MDRD: 33 ML/MIN/1.73SQ M
GFR SERPL CREATININE-BSD FRML MDRD: 34 ML/MIN/1.73SQ M
GFR SERPL CREATININE-BSD FRML MDRD: 35 ML/MIN/1.73SQ M
GFR SERPL CREATININE-BSD FRML MDRD: 35 ML/MIN/1.73SQ M
GFR SERPL CREATININE-BSD FRML MDRD: 40 ML/MIN/1.73SQ M
GFR SERPL CREATININE-BSD FRML MDRD: 49 ML/MIN/1.73SQ M
GFR SERPL CREATININE-BSD FRML MDRD: 50 ML/MIN/1.73SQ M
GFR SERPL CREATININE-BSD FRML MDRD: 51 ML/MIN/1.73SQ M
GFR SERPL CREATININE-BSD FRML MDRD: 53 ML/MIN/1.73SQ M
GFR SERPL CREATININE-BSD FRML MDRD: 54 ML/MIN/1.73SQ M
GFR SERPL CREATININE-BSD FRML MDRD: 54 ML/MIN/1.73SQ M
GLUCOSE SERPL-MCNC: 109 MG/DL (ref 65–140)
GLUCOSE SERPL-MCNC: 110 MG/DL (ref 65–140)
GLUCOSE SERPL-MCNC: 114 MG/DL (ref 65–140)
GLUCOSE SERPL-MCNC: 124 MG/DL (ref 65–140)
GLUCOSE SERPL-MCNC: 127 MG/DL (ref 65–140)
GLUCOSE SERPL-MCNC: 166 MG/DL (ref 65–140)
GLUCOSE SERPL-MCNC: 52 MG/DL (ref 65–140)
GLUCOSE SERPL-MCNC: 67 MG/DL (ref 65–140)
GLUCOSE SERPL-MCNC: 68 MG/DL (ref 65–140)
GLUCOSE SERPL-MCNC: 70 MG/DL (ref 65–140)
GLUCOSE SERPL-MCNC: 75 MG/DL (ref 65–140)
GLUCOSE SERPL-MCNC: 78 MG/DL (ref 65–140)
GLUCOSE SERPL-MCNC: 79 MG/DL (ref 65–140)
GLUCOSE SERPL-MCNC: 80 MG/DL (ref 65–140)
GLUCOSE SERPL-MCNC: 80 MG/DL (ref 65–140)
GLUCOSE SERPL-MCNC: 81 MG/DL (ref 65–140)
GLUCOSE SERPL-MCNC: 81 MG/DL (ref 65–140)
GLUCOSE SERPL-MCNC: 84 MG/DL (ref 65–140)
GLUCOSE SERPL-MCNC: 85 MG/DL (ref 65–140)
GLUCOSE SERPL-MCNC: 86 MG/DL (ref 65–140)
GLUCOSE SERPL-MCNC: 86 MG/DL (ref 65–140)
GLUCOSE SERPL-MCNC: 90 MG/DL (ref 65–140)
GLUCOSE SERPL-MCNC: 91 MG/DL (ref 65–140)
GLUCOSE SERPL-MCNC: 91 MG/DL (ref 65–140)
GLUCOSE SERPL-MCNC: 93 MG/DL (ref 65–140)
GLUCOSE SERPL-MCNC: 93 MG/DL (ref 65–140)
GLUCOSE SERPL-MCNC: 95 MG/DL (ref 65–140)
GLUCOSE SERPL-MCNC: 96 MG/DL (ref 65–140)
GLUCOSE UR STRIP-MCNC: NEGATIVE MG/DL
HCO3 BLDA-SCNC: 24.2 MMOL/L (ref 22–28)
HCO3 BLDA-SCNC: 29.2 MMOL/L (ref 22–28)
HCT VFR BLD AUTO: 31.7 % (ref 36.5–49.3)
HCT VFR BLD AUTO: 32.5 % (ref 36.5–49.3)
HCT VFR BLD AUTO: 32.6 % (ref 36.5–49.3)
HCT VFR BLD AUTO: 33 % (ref 36.5–49.3)
HCT VFR BLD AUTO: 34.9 % (ref 36.5–49.3)
HCT VFR BLD AUTO: 35.4 % (ref 36.5–49.3)
HCT VFR BLD AUTO: 35.6 % (ref 36.5–49.3)
HCT VFR BLD AUTO: 36.8 % (ref 36.5–49.3)
HCT VFR BLD AUTO: 37 % (ref 36.5–49.3)
HCT VFR BLD AUTO: 37.8 % (ref 36.5–49.3)
HCT VFR BLD AUTO: 37.9 % (ref 36.5–49.3)
HCT VFR BLD AUTO: 38.9 % (ref 36.5–49.3)
HCT VFR BLD AUTO: 40.8 % (ref 36.5–49.3)
HCT VFR BLD AUTO: 43.5 % (ref 36.5–49.3)
HCT VFR BLD CALC: 31 % (ref 36.5–49.3)
HGB BLD-MCNC: 10.1 G/DL (ref 12–17)
HGB BLD-MCNC: 10.3 G/DL (ref 12–17)
HGB BLD-MCNC: 10.5 G/DL (ref 12–17)
HGB BLD-MCNC: 10.8 G/DL (ref 12–17)
HGB BLD-MCNC: 11 G/DL (ref 12–17)
HGB BLD-MCNC: 11.1 G/DL (ref 12–17)
HGB BLD-MCNC: 11.1 G/DL (ref 12–17)
HGB BLD-MCNC: 12 G/DL (ref 12–17)
HGB BLD-MCNC: 12.1 G/DL (ref 12–17)
HGB BLD-MCNC: 13.4 G/DL (ref 12–17)
HGB BLD-MCNC: 9.3 G/DL (ref 12–17)
HGB BLD-MCNC: 9.7 G/DL (ref 12–17)
HGB BLD-MCNC: 9.7 G/DL (ref 12–17)
HGB BLD-MCNC: 9.8 G/DL (ref 12–17)
HGB BLDA-MCNC: 10.5 G/DL (ref 12–17)
HGB UR QL STRIP.AUTO: ABNORMAL
IMM GRANULOCYTES # BLD AUTO: 0.01 THOUSAND/UL (ref 0–0.2)
IMM GRANULOCYTES # BLD AUTO: 0.02 THOUSAND/UL (ref 0–0.2)
IMM GRANULOCYTES NFR BLD AUTO: 0 % (ref 0–2)
INR PPP: 1.08 (ref 0.86–1.17)
IPAP: 14
KETONES UR STRIP-MCNC: NEGATIVE MG/DL
LACTATE SERPL-SCNC: 0.6 MMOL/L (ref 0.5–2)
LACTATE SERPL-SCNC: 1.9 MMOL/L (ref 0.5–2)
LEUKOCYTE ESTERASE UR QL STRIP: ABNORMAL
LYMPHOCYTES # BLD AUTO: 0.31 THOUSANDS/ΜL (ref 0.6–4.47)
LYMPHOCYTES # BLD AUTO: 0.39 THOUSANDS/ΜL (ref 0.6–4.47)
LYMPHOCYTES # BLD AUTO: 0.48 THOUSANDS/ΜL (ref 0.6–4.47)
LYMPHOCYTES # BLD AUTO: 0.52 THOUSANDS/ΜL (ref 0.6–4.47)
LYMPHOCYTES # BLD AUTO: 0.53 THOUSANDS/ΜL (ref 0.6–4.47)
LYMPHOCYTES # BLD AUTO: 0.53 THOUSANDS/ΜL (ref 0.6–4.47)
LYMPHOCYTES # BLD AUTO: 0.62 THOUSANDS/ΜL (ref 0.6–4.47)
LYMPHOCYTES # BLD AUTO: 0.68 THOUSANDS/ΜL (ref 0.6–4.47)
LYMPHOCYTES NFR BLD AUTO: 10 % (ref 14–44)
LYMPHOCYTES NFR BLD AUTO: 12 % (ref 14–44)
LYMPHOCYTES NFR BLD AUTO: 13 % (ref 14–44)
LYMPHOCYTES NFR BLD AUTO: 13 % (ref 14–44)
LYMPHOCYTES NFR BLD AUTO: 5 % (ref 14–44)
LYMPHOCYTES NFR BLD AUTO: 9 % (ref 14–44)
MAGNESIUM SERPL-MCNC: 2.3 MG/DL (ref 1.6–2.6)
MAGNESIUM SERPL-MCNC: 2.6 MG/DL (ref 1.6–2.6)
MAGNESIUM SERPL-MCNC: 2.7 MG/DL (ref 1.6–2.6)
MAGNESIUM SERPL-MCNC: 2.8 MG/DL (ref 1.6–2.6)
MCH RBC QN AUTO: 29.8 PG (ref 26.8–34.3)
MCH RBC QN AUTO: 30 PG (ref 26.8–34.3)
MCH RBC QN AUTO: 30.4 PG (ref 26.8–34.3)
MCH RBC QN AUTO: 30.5 PG (ref 26.8–34.3)
MCH RBC QN AUTO: 30.5 PG (ref 26.8–34.3)
MCH RBC QN AUTO: 30.6 PG (ref 26.8–34.3)
MCH RBC QN AUTO: 30.7 PG (ref 26.8–34.3)
MCH RBC QN AUTO: 30.8 PG (ref 26.8–34.3)
MCH RBC QN AUTO: 30.9 PG (ref 26.8–34.3)
MCH RBC QN AUTO: 31 PG (ref 26.8–34.3)
MCH RBC QN AUTO: 31.2 PG (ref 26.8–34.3)
MCH RBC QN AUTO: 31.7 PG (ref 26.8–34.3)
MCHC RBC AUTO-ENTMCNC: 28.9 G/DL (ref 31.4–37.4)
MCHC RBC AUTO-ENTMCNC: 28.9 G/DL (ref 31.4–37.4)
MCHC RBC AUTO-ENTMCNC: 29.2 G/DL (ref 31.4–37.4)
MCHC RBC AUTO-ENTMCNC: 29.3 G/DL (ref 31.4–37.4)
MCHC RBC AUTO-ENTMCNC: 29.3 G/DL (ref 31.4–37.4)
MCHC RBC AUTO-ENTMCNC: 29.4 G/DL (ref 31.4–37.4)
MCHC RBC AUTO-ENTMCNC: 29.4 G/DL (ref 31.4–37.4)
MCHC RBC AUTO-ENTMCNC: 29.7 G/DL (ref 31.4–37.4)
MCHC RBC AUTO-ENTMCNC: 29.7 G/DL (ref 31.4–37.4)
MCHC RBC AUTO-ENTMCNC: 29.8 G/DL (ref 31.4–37.4)
MCHC RBC AUTO-ENTMCNC: 29.9 G/DL (ref 31.4–37.4)
MCHC RBC AUTO-ENTMCNC: 30.2 G/DL (ref 31.4–37.4)
MCHC RBC AUTO-ENTMCNC: 30.8 G/DL (ref 31.4–37.4)
MCHC RBC AUTO-ENTMCNC: 30.8 G/DL (ref 31.4–37.4)
MCV RBC AUTO: 100 FL (ref 82–98)
MCV RBC AUTO: 102 FL (ref 82–98)
MCV RBC AUTO: 102 FL (ref 82–98)
MCV RBC AUTO: 103 FL (ref 82–98)
MCV RBC AUTO: 104 FL (ref 82–98)
MCV RBC AUTO: 104 FL (ref 82–98)
MCV RBC AUTO: 105 FL (ref 82–98)
MCV RBC AUTO: 106 FL (ref 82–98)
MCV RBC AUTO: 108 FL (ref 82–98)
MCV RBC AUTO: 109 FL (ref 82–98)
MCV RBC AUTO: 99 FL (ref 82–98)
MONOCYTES # BLD AUTO: 0.37 THOUSAND/ΜL (ref 0.17–1.22)
MONOCYTES # BLD AUTO: 0.4 THOUSAND/ΜL (ref 0.17–1.22)
MONOCYTES # BLD AUTO: 0.4 THOUSAND/ΜL (ref 0.17–1.22)
MONOCYTES # BLD AUTO: 0.42 THOUSAND/ΜL (ref 0.17–1.22)
MONOCYTES # BLD AUTO: 0.44 THOUSAND/ΜL (ref 0.17–1.22)
MONOCYTES # BLD AUTO: 0.47 THOUSAND/ΜL (ref 0.17–1.22)
MONOCYTES # BLD AUTO: 0.49 THOUSAND/ΜL (ref 0.17–1.22)
MONOCYTES # BLD AUTO: 0.54 THOUSAND/ΜL (ref 0.17–1.22)
MONOCYTES NFR BLD AUTO: 10 % (ref 4–12)
MONOCYTES NFR BLD AUTO: 6 % (ref 4–12)
MONOCYTES NFR BLD AUTO: 9 % (ref 4–12)
MONOCYTES NFR BLD AUTO: 9 % (ref 4–12)
MRSA NOSE QL CULT: NORMAL
NEUTROPHILS # BLD AUTO: 3.14 THOUSANDS/ΜL (ref 1.85–7.62)
NEUTROPHILS # BLD AUTO: 3.26 THOUSANDS/ΜL (ref 1.85–7.62)
NEUTROPHILS # BLD AUTO: 3.27 THOUSANDS/ΜL (ref 1.85–7.62)
NEUTROPHILS # BLD AUTO: 3.31 THOUSANDS/ΜL (ref 1.85–7.62)
NEUTROPHILS # BLD AUTO: 3.66 THOUSANDS/ΜL (ref 1.85–7.62)
NEUTROPHILS # BLD AUTO: 3.88 THOUSANDS/ΜL (ref 1.85–7.62)
NEUTROPHILS # BLD AUTO: 4.21 THOUSANDS/ΜL (ref 1.85–7.62)
NEUTROPHILS # BLD AUTO: 5.67 THOUSANDS/ΜL (ref 1.85–7.62)
NEUTS SEG NFR BLD AUTO: 75 % (ref 43–75)
NEUTS SEG NFR BLD AUTO: 76 % (ref 43–75)
NEUTS SEG NFR BLD AUTO: 77 % (ref 43–75)
NEUTS SEG NFR BLD AUTO: 78 % (ref 43–75)
NEUTS SEG NFR BLD AUTO: 79 % (ref 43–75)
NEUTS SEG NFR BLD AUTO: 80 % (ref 43–75)
NEUTS SEG NFR BLD AUTO: 80 % (ref 43–75)
NEUTS SEG NFR BLD AUTO: 89 % (ref 43–75)
NITRITE UR QL STRIP: NEGATIVE
NON VENT- BIPAP: ABNORMAL
NON-SQ EPI CELLS URNS QL MICRO: ABNORMAL /HPF
NRBC BLD AUTO-RTO: 0 /100 WBCS
NRBC BLD AUTO-RTO: 1 /100 WBCS
NT-PROBNP SERPL-MCNC: ABNORMAL PG/ML
NT-PROBNP SERPL-MCNC: ABNORMAL PG/ML
O2 CT BLDA-SCNC: 14.7 ML/DL (ref 16–23)
OXYHGB MFR BLDA: 97.5 % (ref 94–97)
PCO2 BLD: 31 MMOL/L (ref 21–32)
PCO2 BLD: 69.8 MM HG (ref 36–44)
PCO2 BLDA: 40.3 MM HG (ref 36–44)
PEEP MAX SETTING VENT: 6 CM[H2O]
PH BLD: 7.23 [PH] (ref 7.35–7.45)
PH BLDA: 7.4 [PH] (ref 7.35–7.45)
PH UR STRIP.AUTO: 5.5 [PH]
PHOSPHATE SERPL-MCNC: 4.7 MG/DL (ref 2.3–4.1)
PHOSPHATE SERPL-MCNC: 4.9 MG/DL (ref 2.3–4.1)
PLATELET # BLD AUTO: 62 THOUSANDS/UL (ref 149–390)
PLATELET # BLD AUTO: 67 THOUSANDS/UL (ref 149–390)
PLATELET # BLD AUTO: 68 THOUSANDS/UL (ref 149–390)
PLATELET # BLD AUTO: 71 THOUSANDS/UL (ref 149–390)
PLATELET # BLD AUTO: 73 THOUSANDS/UL (ref 149–390)
PLATELET # BLD AUTO: 79 THOUSANDS/UL (ref 149–390)
PLATELET # BLD AUTO: 80 THOUSANDS/UL (ref 149–390)
PLATELET # BLD AUTO: 80 THOUSANDS/UL (ref 149–390)
PLATELET # BLD AUTO: 82 THOUSANDS/UL (ref 149–390)
PLATELET # BLD AUTO: 84 THOUSANDS/UL (ref 149–390)
PLATELET # BLD AUTO: 84 THOUSANDS/UL (ref 149–390)
PLATELET # BLD AUTO: 90 THOUSANDS/UL (ref 149–390)
PLATELET # BLD AUTO: 94 THOUSANDS/UL (ref 149–390)
PMV BLD AUTO: 11.4 FL (ref 8.9–12.7)
PMV BLD AUTO: 12.3 FL (ref 8.9–12.7)
PMV BLD AUTO: 12.3 FL (ref 8.9–12.7)
PMV BLD AUTO: 12.4 FL (ref 8.9–12.7)
PMV BLD AUTO: 12.5 FL (ref 8.9–12.7)
PMV BLD AUTO: 12.6 FL (ref 8.9–12.7)
PMV BLD AUTO: 12.7 FL (ref 8.9–12.7)
PMV BLD AUTO: 12.8 FL (ref 8.9–12.7)
PMV BLD AUTO: 12.8 FL (ref 8.9–12.7)
PMV BLD AUTO: 12.9 FL (ref 8.9–12.7)
PMV BLD AUTO: 13.1 FL (ref 8.9–12.7)
PMV BLD AUTO: 13.5 FL (ref 8.9–12.7)
PMV BLD AUTO: 13.6 FL (ref 8.9–12.7)
PMV BLD AUTO: 13.9 FL (ref 8.9–12.7)
PO2 BLD: 347 MM HG (ref 75–129)
PO2 BLDA: 138 MM HG (ref 75–129)
POTASSIUM BLD-SCNC: 4.8 MMOL/L (ref 3.5–5.3)
POTASSIUM SERPL-SCNC: 3.6 MMOL/L (ref 3.5–5.3)
POTASSIUM SERPL-SCNC: 3.6 MMOL/L (ref 3.5–5.3)
POTASSIUM SERPL-SCNC: 3.9 MMOL/L (ref 3.5–5.3)
POTASSIUM SERPL-SCNC: 4.1 MMOL/L (ref 3.5–5.3)
POTASSIUM SERPL-SCNC: 4.5 MMOL/L (ref 3.5–5.3)
POTASSIUM SERPL-SCNC: 4.6 MMOL/L (ref 3.5–5.3)
POTASSIUM SERPL-SCNC: 4.7 MMOL/L (ref 3.5–5.3)
POTASSIUM SERPL-SCNC: 4.9 MMOL/L (ref 3.5–5.3)
POTASSIUM SERPL-SCNC: 4.9 MMOL/L (ref 3.5–5.3)
POTASSIUM SERPL-SCNC: 5 MMOL/L (ref 3.5–5.3)
POTASSIUM SERPL-SCNC: 5 MMOL/L (ref 3.5–5.3)
POTASSIUM SERPL-SCNC: 5.1 MMOL/L (ref 3.5–5.3)
POTASSIUM SERPL-SCNC: 5.1 MMOL/L (ref 3.5–5.3)
POTASSIUM SERPL-SCNC: 5.3 MMOL/L (ref 3.5–5.3)
POTASSIUM SERPL-SCNC: 5.3 MMOL/L (ref 3.5–5.3)
POTASSIUM SERPL-SCNC: 5.4 MMOL/L (ref 3.5–5.3)
POTASSIUM SERPL-SCNC: 5.7 MMOL/L (ref 3.5–5.3)
POTASSIUM SERPL-SCNC: 5.9 MMOL/L (ref 3.5–5.3)
POTASSIUM SERPL-SCNC: 6 MMOL/L (ref 3.5–5.3)
PROCALCITONIN SERPL-MCNC: <0.05 NG/ML
PROCALCITONIN SERPL-MCNC: <0.05 NG/ML
PROT SERPL-MCNC: 6.2 G/DL (ref 6.4–8.2)
PROT SERPL-MCNC: 6.8 G/DL (ref 6.4–8.2)
PROT SERPL-MCNC: 7 G/DL (ref 6.4–8.2)
PROT SERPL-MCNC: 7.1 G/DL (ref 6.4–8.2)
PROT UR STRIP-MCNC: ABNORMAL MG/DL
PROTHROMBIN TIME: 14.1 SECONDS (ref 11.8–14.2)
QRS AXIS: 259 DEGREES
QRS AXIS: 264 DEGREES
QRSD INTERVAL: 100 MS
QRSD INTERVAL: 98 MS
QT INTERVAL: 310 MS
QT INTERVAL: 392 MS
QTC INTERVAL: 413 MS
QTC INTERVAL: 429 MS
RBC # BLD AUTO: 3.02 MILLION/UL (ref 3.88–5.62)
RBC # BLD AUTO: 3.15 MILLION/UL (ref 3.88–5.62)
RBC # BLD AUTO: 3.17 MILLION/UL (ref 3.88–5.62)
RBC # BLD AUTO: 3.19 MILLION/UL (ref 3.88–5.62)
RBC # BLD AUTO: 3.19 MILLION/UL (ref 3.88–5.62)
RBC # BLD AUTO: 3.3 MILLION/UL (ref 3.88–5.62)
RBC # BLD AUTO: 3.42 MILLION/UL (ref 3.88–5.62)
RBC # BLD AUTO: 3.48 MILLION/UL (ref 3.88–5.62)
RBC # BLD AUTO: 3.59 MILLION/UL (ref 3.88–5.62)
RBC # BLD AUTO: 3.7 MILLION/UL (ref 3.88–5.62)
RBC # BLD AUTO: 3.72 MILLION/UL (ref 3.88–5.62)
RBC # BLD AUTO: 3.9 MILLION/UL (ref 3.88–5.62)
RBC # BLD AUTO: 3.97 MILLION/UL (ref 3.88–5.62)
RBC # BLD AUTO: 4.39 MILLION/UL (ref 3.88–5.62)
RBC #/AREA URNS AUTO: ABNORMAL /HPF
SAO2 % BLD FROM PO2: 100 % (ref 95–98)
SODIUM BLD-SCNC: 143 MMOL/L (ref 136–145)
SODIUM SERPL-SCNC: 139 MMOL/L (ref 136–145)
SODIUM SERPL-SCNC: 140 MMOL/L (ref 136–145)
SODIUM SERPL-SCNC: 141 MMOL/L (ref 136–145)
SODIUM SERPL-SCNC: 142 MMOL/L (ref 136–145)
SODIUM SERPL-SCNC: 143 MMOL/L (ref 136–145)
SODIUM SERPL-SCNC: 144 MMOL/L (ref 136–145)
SODIUM SERPL-SCNC: 144 MMOL/L (ref 136–145)
SODIUM SERPL-SCNC: 145 MMOL/L (ref 136–145)
SODIUM SERPL-SCNC: 146 MMOL/L (ref 136–145)
SODIUM SERPL-SCNC: 147 MMOL/L (ref 136–145)
SODIUM SERPL-SCNC: 148 MMOL/L (ref 136–145)
SODIUM SERPL-SCNC: 148 MMOL/L (ref 136–145)
SP GR UR STRIP.AUTO: 1.02 (ref 1–1.03)
SPECIMEN SOURCE: ABNORMAL
SPECIMEN SOURCE: ABNORMAL
T WAVE AXIS: 248 DEGREES
T WAVE AXIS: 69 DEGREES
T3FREE SERPL-MCNC: 1.39 PG/ML (ref 2.3–4.2)
T4 FREE SERPL-MCNC: 0.67 NG/DL (ref 0.76–1.46)
T4 FREE SERPL-MCNC: 0.69 NG/DL (ref 0.76–1.46)
T4 FREE SERPL-MCNC: 0.91 NG/DL (ref 0.76–1.46)
TROPONIN I SERPL-MCNC: 0.28 NG/ML
TROPONIN I SERPL-MCNC: 0.31 NG/ML
TROPONIN I SERPL-MCNC: 0.32 NG/ML
TSH SERPL DL<=0.05 MIU/L-ACNC: 10.2 UIU/ML (ref 0.36–3.74)
TSH SERPL DL<=0.05 MIU/L-ACNC: 14.14 UIU/ML (ref 0.36–3.74)
UROBILINOGEN UR QL STRIP.AUTO: 0.2 E.U./DL
VENT BIPAP FIO2: 35 %
VENTRICULAR RATE: 107 BPM
VENTRICULAR RATE: 72 BPM
WBC # BLD AUTO: 4.11 THOUSAND/UL (ref 4.31–10.16)
WBC # BLD AUTO: 4.16 THOUSAND/UL (ref 4.31–10.16)
WBC # BLD AUTO: 4.2 THOUSAND/UL (ref 4.31–10.16)
WBC # BLD AUTO: 4.27 THOUSAND/UL (ref 4.31–10.16)
WBC # BLD AUTO: 4.29 THOUSAND/UL (ref 4.31–10.16)
WBC # BLD AUTO: 4.39 THOUSAND/UL (ref 4.31–10.16)
WBC # BLD AUTO: 4.46 THOUSAND/UL (ref 4.31–10.16)
WBC # BLD AUTO: 4.53 THOUSAND/UL (ref 4.31–10.16)
WBC # BLD AUTO: 4.66 THOUSAND/UL (ref 4.31–10.16)
WBC # BLD AUTO: 4.66 THOUSAND/UL (ref 4.31–10.16)
WBC # BLD AUTO: 5.17 THOUSAND/UL (ref 4.31–10.16)
WBC # BLD AUTO: 5.35 THOUSAND/UL (ref 4.31–10.16)
WBC # BLD AUTO: 6.16 THOUSAND/UL (ref 4.31–10.16)
WBC # BLD AUTO: 6.42 THOUSAND/UL (ref 4.31–10.16)
WBC #/AREA URNS AUTO: ABNORMAL /HPF

## 2019-01-01 PROCEDURE — 99285 EMERGENCY DEPT VISIT HI MDM: CPT

## 2019-01-01 PROCEDURE — 81001 URINALYSIS AUTO W/SCOPE: CPT | Performed by: PHYSICIAN ASSISTANT

## 2019-01-01 PROCEDURE — G8998 SWALLOW D/C STATUS: HCPCS

## 2019-01-01 PROCEDURE — 76770 US EXAM ABDO BACK WALL COMP: CPT

## 2019-01-01 PROCEDURE — 85027 COMPLETE CBC AUTOMATED: CPT | Performed by: PHYSICIAN ASSISTANT

## 2019-01-01 PROCEDURE — 84295 ASSAY OF SERUM SODIUM: CPT

## 2019-01-01 PROCEDURE — 99232 SBSQ HOSP IP/OBS MODERATE 35: CPT | Performed by: INTERNAL MEDICINE

## 2019-01-01 PROCEDURE — G8988 SELF CARE GOAL STATUS: HCPCS

## 2019-01-01 PROCEDURE — 97163 PT EVAL HIGH COMPLEX 45 MIN: CPT

## 2019-01-01 PROCEDURE — 71045 X-RAY EXAM CHEST 1 VIEW: CPT

## 2019-01-01 PROCEDURE — 97167 OT EVAL HIGH COMPLEX 60 MIN: CPT

## 2019-01-01 PROCEDURE — 99232 SBSQ HOSP IP/OBS MODERATE 35: CPT | Performed by: PHYSICIAN ASSISTANT

## 2019-01-01 PROCEDURE — 84484 ASSAY OF TROPONIN QUANT: CPT | Performed by: EMERGENCY MEDICINE

## 2019-01-01 PROCEDURE — 80048 BASIC METABOLIC PNL TOTAL CA: CPT | Performed by: PHYSICIAN ASSISTANT

## 2019-01-01 PROCEDURE — 84484 ASSAY OF TROPONIN QUANT: CPT | Performed by: PHYSICIAN ASSISTANT

## 2019-01-01 PROCEDURE — 82803 BLOOD GASES ANY COMBINATION: CPT

## 2019-01-01 PROCEDURE — 99284 EMERGENCY DEPT VISIT MOD MDM: CPT

## 2019-01-01 PROCEDURE — 80053 COMPREHEN METABOLIC PANEL: CPT | Performed by: INTERNAL MEDICINE

## 2019-01-01 PROCEDURE — 99232 SBSQ HOSP IP/OBS MODERATE 35: CPT | Performed by: HOSPITALIST

## 2019-01-01 PROCEDURE — 99223 1ST HOSP IP/OBS HIGH 75: CPT | Performed by: INTERNAL MEDICINE

## 2019-01-01 PROCEDURE — 80053 COMPREHEN METABOLIC PANEL: CPT | Performed by: EMERGENCY MEDICINE

## 2019-01-01 PROCEDURE — 85014 HEMATOCRIT: CPT

## 2019-01-01 PROCEDURE — 85730 THROMBOPLASTIN TIME PARTIAL: CPT | Performed by: EMERGENCY MEDICINE

## 2019-01-01 PROCEDURE — 94760 N-INVAS EAR/PLS OXIMETRY 1: CPT

## 2019-01-01 PROCEDURE — 84100 ASSAY OF PHOSPHORUS: CPT | Performed by: INTERNAL MEDICINE

## 2019-01-01 PROCEDURE — 97530 THERAPEUTIC ACTIVITIES: CPT

## 2019-01-01 PROCEDURE — 84481 FREE ASSAY (FT-3): CPT | Performed by: PHYSICIAN ASSISTANT

## 2019-01-01 PROCEDURE — 84443 ASSAY THYROID STIM HORMONE: CPT | Performed by: PHYSICIAN ASSISTANT

## 2019-01-01 PROCEDURE — 80048 BASIC METABOLIC PNL TOTAL CA: CPT | Performed by: INTERNAL MEDICINE

## 2019-01-01 PROCEDURE — 85652 RBC SED RATE AUTOMATED: CPT | Performed by: PHYSICIAN ASSISTANT

## 2019-01-01 PROCEDURE — 83735 ASSAY OF MAGNESIUM: CPT | Performed by: PHYSICIAN ASSISTANT

## 2019-01-01 PROCEDURE — 84439 ASSAY OF FREE THYROXINE: CPT | Performed by: PHYSICIAN ASSISTANT

## 2019-01-01 PROCEDURE — 85025 COMPLETE CBC W/AUTO DIFF WBC: CPT | Performed by: EMERGENCY MEDICINE

## 2019-01-01 PROCEDURE — 86140 C-REACTIVE PROTEIN: CPT | Performed by: PHYSICIAN ASSISTANT

## 2019-01-01 PROCEDURE — 99233 SBSQ HOSP IP/OBS HIGH 50: CPT | Performed by: INTERNAL MEDICINE

## 2019-01-01 PROCEDURE — 83880 ASSAY OF NATRIURETIC PEPTIDE: CPT | Performed by: EMERGENCY MEDICINE

## 2019-01-01 PROCEDURE — 99231 SBSQ HOSP IP/OBS SF/LOW 25: CPT | Performed by: INTERNAL MEDICINE

## 2019-01-01 PROCEDURE — 36620 INSERTION CATHETER ARTERY: CPT

## 2019-01-01 PROCEDURE — 82330 ASSAY OF CALCIUM: CPT

## 2019-01-01 PROCEDURE — 82533 TOTAL CORTISOL: CPT | Performed by: PHYSICIAN ASSISTANT

## 2019-01-01 PROCEDURE — 82550 ASSAY OF CK (CPK): CPT | Performed by: PHYSICIAN ASSISTANT

## 2019-01-01 PROCEDURE — 93308 TTE F-UP OR LMTD: CPT

## 2019-01-01 PROCEDURE — 94660 CPAP INITIATION&MGMT: CPT

## 2019-01-01 PROCEDURE — 36415 COLL VENOUS BLD VENIPUNCTURE: CPT | Performed by: EMERGENCY MEDICINE

## 2019-01-01 PROCEDURE — 99239 HOSP IP/OBS DSCHRG MGMT >30: CPT | Performed by: INTERNAL MEDICINE

## 2019-01-01 PROCEDURE — G8979 MOBILITY GOAL STATUS: HCPCS

## 2019-01-01 PROCEDURE — 93010 ELECTROCARDIOGRAM REPORT: CPT | Performed by: INTERNAL MEDICINE

## 2019-01-01 PROCEDURE — G8978 MOBILITY CURRENT STATUS: HCPCS

## 2019-01-01 PROCEDURE — 99232 SBSQ HOSP IP/OBS MODERATE 35: CPT | Performed by: NEUROLOGICAL SURGERY

## 2019-01-01 PROCEDURE — 99204 OFFICE O/P NEW MOD 45 MIN: CPT | Performed by: INTERNAL MEDICINE

## 2019-01-01 PROCEDURE — 84132 ASSAY OF SERUM POTASSIUM: CPT

## 2019-01-01 PROCEDURE — 93005 ELECTROCARDIOGRAM TRACING: CPT

## 2019-01-01 PROCEDURE — 85025 COMPLETE CBC W/AUTO DIFF WBC: CPT | Performed by: INTERNAL MEDICINE

## 2019-01-01 PROCEDURE — 96365 THER/PROPH/DIAG IV INF INIT: CPT

## 2019-01-01 PROCEDURE — 82040 ASSAY OF SERUM ALBUMIN: CPT | Performed by: INTERNAL MEDICINE

## 2019-01-01 PROCEDURE — 87081 CULTURE SCREEN ONLY: CPT | Performed by: PHYSICIAN ASSISTANT

## 2019-01-01 PROCEDURE — G8997 SWALLOW GOAL STATUS: HCPCS

## 2019-01-01 PROCEDURE — 93306 TTE W/DOPPLER COMPLETE: CPT

## 2019-01-01 PROCEDURE — G8996 SWALLOW CURRENT STATUS: HCPCS

## 2019-01-01 PROCEDURE — 85025 COMPLETE CBC W/AUTO DIFF WBC: CPT | Performed by: PHYSICIAN ASSISTANT

## 2019-01-01 PROCEDURE — 71046 X-RAY EXAM CHEST 2 VIEWS: CPT

## 2019-01-01 PROCEDURE — 99239 HOSP IP/OBS DSCHRG MGMT >30: CPT | Performed by: HOSPITALIST

## 2019-01-01 PROCEDURE — G8987 SELF CARE CURRENT STATUS: HCPCS

## 2019-01-01 PROCEDURE — 72100 X-RAY EXAM L-S SPINE 2/3 VWS: CPT

## 2019-01-01 PROCEDURE — 99222 1ST HOSP IP/OBS MODERATE 55: CPT | Performed by: NEUROLOGICAL SURGERY

## 2019-01-01 PROCEDURE — 96375 TX/PRO/DX INJ NEW DRUG ADDON: CPT

## 2019-01-01 PROCEDURE — 87040 BLOOD CULTURE FOR BACTERIA: CPT | Performed by: EMERGENCY MEDICINE

## 2019-01-01 PROCEDURE — 94762 N-INVAS EAR/PLS OXIMTRY CONT: CPT

## 2019-01-01 PROCEDURE — 82948 REAGENT STRIP/BLOOD GLUCOSE: CPT

## 2019-01-01 PROCEDURE — 93306 TTE W/DOPPLER COMPLETE: CPT | Performed by: INTERNAL MEDICINE

## 2019-01-01 PROCEDURE — 80053 COMPREHEN METABOLIC PANEL: CPT | Performed by: HOSPITALIST

## 2019-01-01 PROCEDURE — 82947 ASSAY GLUCOSE BLOOD QUANT: CPT

## 2019-01-01 PROCEDURE — 97535 SELF CARE MNGMENT TRAINING: CPT

## 2019-01-01 PROCEDURE — 93321 DOPPLER ECHO F-UP/LMTD STD: CPT | Performed by: INTERNAL MEDICINE

## 2019-01-01 PROCEDURE — 92610 EVALUATE SWALLOWING FUNCTION: CPT

## 2019-01-01 PROCEDURE — 84145 PROCALCITONIN (PCT): CPT | Performed by: PHYSICIAN ASSISTANT

## 2019-01-01 PROCEDURE — 82746 ASSAY OF FOLIC ACID SERUM: CPT | Performed by: PHYSICIAN ASSISTANT

## 2019-01-01 PROCEDURE — 83880 ASSAY OF NATRIURETIC PEPTIDE: CPT | Performed by: PHYSICIAN ASSISTANT

## 2019-01-01 PROCEDURE — 83735 ASSAY OF MAGNESIUM: CPT | Performed by: INTERNAL MEDICINE

## 2019-01-01 PROCEDURE — 85027 COMPLETE CBC AUTOMATED: CPT | Performed by: INTERNAL MEDICINE

## 2019-01-01 PROCEDURE — 85610 PROTHROMBIN TIME: CPT | Performed by: EMERGENCY MEDICINE

## 2019-01-01 PROCEDURE — 36600 WITHDRAWAL OF ARTERIAL BLOOD: CPT

## 2019-01-01 PROCEDURE — 93325 DOPPLER ECHO COLOR FLOW MAPG: CPT | Performed by: INTERNAL MEDICINE

## 2019-01-01 PROCEDURE — 83605 ASSAY OF LACTIC ACID: CPT | Performed by: EMERGENCY MEDICINE

## 2019-01-01 PROCEDURE — 0H9KXZZ DRAINAGE OF RIGHT LOWER LEG SKIN, EXTERNAL APPROACH: ICD-10-PCS | Performed by: PODIATRIST

## 2019-01-01 PROCEDURE — 93308 TTE F-UP OR LMTD: CPT | Performed by: INTERNAL MEDICINE

## 2019-01-01 PROCEDURE — 94640 AIRWAY INHALATION TREATMENT: CPT

## 2019-01-01 PROCEDURE — 99291 CRITICAL CARE FIRST HOUR: CPT | Performed by: EMERGENCY MEDICINE

## 2019-01-01 PROCEDURE — 99222 1ST HOSP IP/OBS MODERATE 55: CPT | Performed by: INTERNAL MEDICINE

## 2019-01-01 PROCEDURE — 93923 UPR/LXTR ART STDY 3+ LVLS: CPT

## 2019-01-01 PROCEDURE — 85730 THROMBOPLASTIN TIME PARTIAL: CPT | Performed by: INTERNAL MEDICINE

## 2019-01-01 PROCEDURE — 85025 COMPLETE CBC W/AUTO DIFF WBC: CPT | Performed by: HOSPITALIST

## 2019-01-01 PROCEDURE — 84100 ASSAY OF PHOSPHORUS: CPT | Performed by: PHYSICIAN ASSISTANT

## 2019-01-01 PROCEDURE — 85049 AUTOMATED PLATELET COUNT: CPT | Performed by: PHYSICIAN ASSISTANT

## 2019-01-01 PROCEDURE — 93923 UPR/LXTR ART STDY 3+ LVLS: CPT | Performed by: SURGERY

## 2019-01-01 PROCEDURE — 83605 ASSAY OF LACTIC ACID: CPT | Performed by: PHYSICIAN ASSISTANT

## 2019-01-01 PROCEDURE — 72072 X-RAY EXAM THORAC SPINE 3VWS: CPT

## 2019-01-01 PROCEDURE — 80048 BASIC METABOLIC PNL TOTAL CA: CPT | Performed by: EMERGENCY MEDICINE

## 2019-01-01 PROCEDURE — 80048 BASIC METABOLIC PNL TOTAL CA: CPT | Performed by: HOSPITALIST

## 2019-01-01 PROCEDURE — 82805 BLOOD GASES W/O2 SATURATION: CPT | Performed by: HOSPITALIST

## 2019-01-01 RX ORDER — BISACODYL 10 MG
10 SUPPOSITORY, RECTAL RECTAL DAILY PRN
Status: DISCONTINUED | OUTPATIENT
Start: 2019-01-01 | End: 2019-01-01

## 2019-01-01 RX ORDER — SENNOSIDES 8.6 MG
1 TABLET ORAL DAILY
Status: DISCONTINUED | OUTPATIENT
Start: 2019-01-01 | End: 2019-01-01 | Stop reason: HOSPADM

## 2019-01-01 RX ORDER — SODIUM CHLORIDE 450 MG/100ML
100 INJECTION, SOLUTION INTRAVENOUS CONTINUOUS
Status: DISCONTINUED | OUTPATIENT
Start: 2019-01-01 | End: 2019-01-01

## 2019-01-01 RX ORDER — DOCUSATE SODIUM 100 MG/1
100 CAPSULE, LIQUID FILLED ORAL 2 TIMES DAILY
Status: DISCONTINUED | OUTPATIENT
Start: 2019-01-01 | End: 2019-01-01 | Stop reason: HOSPADM

## 2019-01-01 RX ORDER — HEPARIN SODIUM 1000 [USP'U]/ML
4000 INJECTION, SOLUTION INTRAVENOUS; SUBCUTANEOUS ONCE
Status: DISCONTINUED | OUTPATIENT
Start: 2019-01-01 | End: 2019-01-01

## 2019-01-01 RX ORDER — ASPIRIN 81 MG/1
81 TABLET, CHEWABLE ORAL DAILY
Status: DISCONTINUED | OUTPATIENT
Start: 2019-01-01 | End: 2019-01-01 | Stop reason: HOSPADM

## 2019-01-01 RX ORDER — FUROSEMIDE 40 MG/1
40 TABLET ORAL DAILY
Status: DISCONTINUED | OUTPATIENT
Start: 2019-01-01 | End: 2019-01-01

## 2019-01-01 RX ORDER — METOPROLOL SUCCINATE 25 MG/1
12.5 TABLET, EXTENDED RELEASE ORAL 2 TIMES DAILY
Status: DISCONTINUED | OUTPATIENT
Start: 2019-01-01 | End: 2019-01-01 | Stop reason: HOSPADM

## 2019-01-01 RX ORDER — HEPARIN SODIUM 5000 [USP'U]/ML
5000 INJECTION, SOLUTION INTRAVENOUS; SUBCUTANEOUS EVERY 8 HOURS SCHEDULED
Status: DISCONTINUED | OUTPATIENT
Start: 2019-01-01 | End: 2019-01-01 | Stop reason: HOSPADM

## 2019-01-01 RX ORDER — ALUMINUM ZIRCONIUM OCTACHLOROHYDREX GLY 16 G/100G
1 GEL TOPICAL DAILY
Status: DISCONTINUED | OUTPATIENT
Start: 2019-01-01 | End: 2019-01-01 | Stop reason: HOSPADM

## 2019-01-01 RX ORDER — VANCOMYCIN HYDROCHLORIDE 1 G/200ML
12.5 INJECTION, SOLUTION INTRAVENOUS EVERY 24 HOURS
Status: DISCONTINUED | OUTPATIENT
Start: 2019-01-01 | End: 2019-01-01

## 2019-01-01 RX ORDER — FUROSEMIDE 10 MG/ML
40 INJECTION INTRAMUSCULAR; INTRAVENOUS ONCE
Status: COMPLETED | OUTPATIENT
Start: 2019-01-01 | End: 2019-01-01

## 2019-01-01 RX ORDER — ASPIRIN 81 MG
TABLET,CHEWABLE ORAL 2 TIMES DAILY PRN
COMMUNITY
End: 2019-01-01

## 2019-01-01 RX ORDER — FUROSEMIDE 20 MG/1
20 TABLET ORAL DAILY
Status: DISCONTINUED | OUTPATIENT
Start: 2019-01-01 | End: 2019-01-01 | Stop reason: HOSPADM

## 2019-01-01 RX ORDER — ONDANSETRON 2 MG/ML
4 INJECTION INTRAMUSCULAR; INTRAVENOUS EVERY 6 HOURS PRN
Status: DISCONTINUED | OUTPATIENT
Start: 2019-01-01 | End: 2019-01-01 | Stop reason: HOSPADM

## 2019-01-01 RX ORDER — HYDROMORPHONE HCL/PF 1 MG/ML
0.5 SYRINGE (ML) INJECTION
Status: DISCONTINUED | OUTPATIENT
Start: 2019-01-01 | End: 2019-01-01 | Stop reason: HOSPADM

## 2019-01-01 RX ORDER — FUROSEMIDE 10 MG/ML
40 INJECTION INTRAMUSCULAR; INTRAVENOUS
Status: DISCONTINUED | OUTPATIENT
Start: 2019-01-01 | End: 2019-01-01

## 2019-01-01 RX ORDER — ONDANSETRON 2 MG/ML
4 INJECTION INTRAMUSCULAR; INTRAVENOUS EVERY 6 HOURS PRN
Status: DISCONTINUED | OUTPATIENT
Start: 2019-01-01 | End: 2019-01-01

## 2019-01-01 RX ORDER — DEXTROSE MONOHYDRATE 50 MG/ML
50 INJECTION, SOLUTION INTRAVENOUS CONTINUOUS
Status: DISCONTINUED | OUTPATIENT
Start: 2019-01-01 | End: 2019-01-01

## 2019-01-01 RX ORDER — SODIUM POLYSTYRENE SULFONATE 4.1 MEQ/G
30 POWDER, FOR SUSPENSION ORAL; RECTAL ONCE
Status: COMPLETED | OUTPATIENT
Start: 2019-01-01 | End: 2019-01-01

## 2019-01-01 RX ORDER — FINASTERIDE 5 MG/1
5 TABLET, FILM COATED ORAL DAILY
Status: DISCONTINUED | OUTPATIENT
Start: 2019-01-01 | End: 2019-01-01 | Stop reason: HOSPADM

## 2019-01-01 RX ORDER — METOPROLOL SUCCINATE 25 MG/1
25 TABLET, EXTENDED RELEASE ORAL DAILY
Status: DISCONTINUED | OUTPATIENT
Start: 2019-01-01 | End: 2019-01-01

## 2019-01-01 RX ORDER — ACETAMINOPHEN 325 MG/1
2 TABLET ORAL EVERY 4 HOURS PRN
COMMUNITY
Start: 2014-08-28 | End: 2019-01-01 | Stop reason: HOSPADM

## 2019-01-01 RX ORDER — SENNOSIDES 8.6 MG
1 TABLET ORAL
Qty: 120 EACH | Refills: 0 | Status: SHIPPED | OUTPATIENT
Start: 2019-01-01 | End: 2019-01-01 | Stop reason: HOSPADM

## 2019-01-01 RX ORDER — ASPIRIN 325 MG
325 TABLET ORAL ONCE
Status: COMPLETED | OUTPATIENT
Start: 2019-01-01 | End: 2019-01-01

## 2019-01-01 RX ORDER — HEPARIN SODIUM 10000 [USP'U]/100ML
3-30 INJECTION, SOLUTION INTRAVENOUS
Status: DISCONTINUED | OUTPATIENT
Start: 2019-01-01 | End: 2019-01-01

## 2019-01-01 RX ORDER — MIDODRINE HYDROCHLORIDE 5 MG/1
5 TABLET ORAL
Status: DISCONTINUED | OUTPATIENT
Start: 2019-01-01 | End: 2019-01-01

## 2019-01-01 RX ORDER — METOPROLOL SUCCINATE 25 MG/1
12.5 TABLET, EXTENDED RELEASE ORAL 2 TIMES DAILY
Status: DISCONTINUED | OUTPATIENT
Start: 2019-01-01 | End: 2019-01-01

## 2019-01-01 RX ORDER — MAGNESIUM HYDROXIDE/ALUMINUM HYDROXICE/SIMETHICONE 120; 1200; 1200 MG/30ML; MG/30ML; MG/30ML
30 SUSPENSION ORAL EVERY 4 HOURS PRN
COMMUNITY
End: 2019-01-01 | Stop reason: HOSPADM

## 2019-01-01 RX ORDER — CEFEPIME HYDROCHLORIDE 2 G/1
1000 INJECTION, POWDER, FOR SOLUTION INTRAVENOUS EVERY 24 HOURS
Status: DISCONTINUED | OUTPATIENT
Start: 2019-01-01 | End: 2019-01-01

## 2019-01-01 RX ORDER — LORAZEPAM 2 MG/ML
0.5 INJECTION INTRAMUSCULAR ONCE
Status: COMPLETED | OUTPATIENT
Start: 2019-01-01 | End: 2019-01-01

## 2019-01-01 RX ORDER — ACETAMINOPHEN 325 MG/1
650 TABLET ORAL EVERY 6 HOURS PRN
Status: DISCONTINUED | OUTPATIENT
Start: 2019-01-01 | End: 2019-01-01 | Stop reason: HOSPADM

## 2019-01-01 RX ORDER — BISACODYL 10 MG
10 SUPPOSITORY, RECTAL RECTAL DAILY PRN
Status: DISCONTINUED | OUTPATIENT
Start: 2019-01-01 | End: 2019-01-01 | Stop reason: HOSPADM

## 2019-01-01 RX ORDER — HEPARIN SODIUM 1000 [USP'U]/ML
6000 INJECTION, SOLUTION INTRAVENOUS; SUBCUTANEOUS ONCE
Status: COMPLETED | OUTPATIENT
Start: 2019-01-01 | End: 2019-01-01

## 2019-01-01 RX ORDER — FUROSEMIDE 20 MG/1
20 TABLET ORAL DAILY
Qty: 30 TABLET | Refills: 0 | Status: SHIPPED | OUTPATIENT
Start: 2019-01-01 | End: 2019-01-01 | Stop reason: HOSPADM

## 2019-01-01 RX ORDER — OXYCODONE HYDROCHLORIDE 5 MG/1
2.5 TABLET ORAL EVERY 4 HOURS PRN
Status: DISCONTINUED | OUTPATIENT
Start: 2019-01-01 | End: 2019-01-01 | Stop reason: HOSPADM

## 2019-01-01 RX ORDER — METOLAZONE 2.5 MG/1
2.5 TABLET ORAL DAILY
COMMUNITY
End: 2019-01-01

## 2019-01-01 RX ORDER — SODIUM CHLORIDE, SODIUM GLUCONATE, SODIUM ACETATE, POTASSIUM CHLORIDE, MAGNESIUM CHLORIDE, SODIUM PHOSPHATE, DIBASIC, AND POTASSIUM PHOSPHATE .53; .5; .37; .037; .03; .012; .00082 G/100ML; G/100ML; G/100ML; G/100ML; G/100ML; G/100ML; G/100ML
125 INJECTION, SOLUTION INTRAVENOUS CONTINUOUS
Status: DISCONTINUED | OUTPATIENT
Start: 2019-01-01 | End: 2019-01-01

## 2019-01-01 RX ORDER — FUROSEMIDE 40 MG/1
40 TABLET ORAL DAILY
Status: DISCONTINUED | OUTPATIENT
Start: 2019-01-01 | End: 2019-01-01 | Stop reason: HOSPADM

## 2019-01-01 RX ORDER — LORAZEPAM 2 MG/ML
0.5 INJECTION INTRAMUSCULAR
Status: DISCONTINUED | OUTPATIENT
Start: 2019-01-01 | End: 2019-01-01 | Stop reason: HOSPADM

## 2019-01-01 RX ORDER — ONDANSETRON 2 MG/ML
4 INJECTION INTRAMUSCULAR; INTRAVENOUS EVERY 8 HOURS PRN
Status: DISCONTINUED | OUTPATIENT
Start: 2019-01-01 | End: 2019-01-01 | Stop reason: HOSPADM

## 2019-01-01 RX ORDER — DEXTROSE MONOHYDRATE 25 G/50ML
25 INJECTION, SOLUTION INTRAVENOUS ONCE
Status: COMPLETED | OUTPATIENT
Start: 2019-01-01 | End: 2019-01-01

## 2019-01-01 RX ORDER — CEFAZOLIN SODIUM 2 G/50ML
2000 SOLUTION INTRAVENOUS ONCE
Status: COMPLETED | OUTPATIENT
Start: 2019-01-01 | End: 2019-01-01

## 2019-01-01 RX ORDER — MAGNESIUM HYDROXIDE/ALUMINUM HYDROXICE/SIMETHICONE 120; 1200; 1200 MG/30ML; MG/30ML; MG/30ML
30 SUSPENSION ORAL EVERY 4 HOURS PRN
Status: DISCONTINUED | OUTPATIENT
Start: 2019-01-01 | End: 2019-01-01 | Stop reason: HOSPADM

## 2019-01-01 RX ORDER — LEVOTHYROXINE SODIUM 0.03 MG/1
25 TABLET ORAL
Status: DISCONTINUED | OUTPATIENT
Start: 2019-01-01 | End: 2019-01-01 | Stop reason: HOSPADM

## 2019-01-01 RX ORDER — MIDODRINE HYDROCHLORIDE 5 MG/1
10 TABLET ORAL
Status: DISCONTINUED | OUTPATIENT
Start: 2019-01-01 | End: 2019-01-01

## 2019-01-01 RX ORDER — ALBUTEROL SULFATE 2.5 MG/3ML
2.5 SOLUTION RESPIRATORY (INHALATION) EVERY 6 HOURS PRN
COMMUNITY
End: 2019-01-01

## 2019-01-01 RX ORDER — ATORVASTATIN CALCIUM 10 MG/1
10 TABLET, FILM COATED ORAL
COMMUNITY
Start: 2015-12-19 | End: 2019-01-01

## 2019-01-01 RX ORDER — LEVOTHYROXINE SODIUM 0.05 MG/1
50 TABLET ORAL
Status: DISCONTINUED | OUTPATIENT
Start: 2019-01-01 | End: 2019-01-01

## 2019-01-01 RX ORDER — CALCIUM CARBONATE 200(500)MG
1000 TABLET,CHEWABLE ORAL DAILY PRN
Status: DISCONTINUED | OUTPATIENT
Start: 2019-01-01 | End: 2019-01-01 | Stop reason: HOSPADM

## 2019-01-01 RX ORDER — FUROSEMIDE 40 MG/1
40 TABLET ORAL DAILY
Refills: 0 | Status: SHIPPED | OUTPATIENT
Start: 2019-01-01 | End: 2019-01-01

## 2019-01-01 RX ORDER — SENNOSIDES 8.6 MG
1 TABLET ORAL
Status: DISCONTINUED | OUTPATIENT
Start: 2019-01-01 | End: 2019-01-01 | Stop reason: HOSPADM

## 2019-01-01 RX ORDER — ALBUMIN, HUMAN INJ 5% 5 %
12.5 SOLUTION INTRAVENOUS ONCE
Status: COMPLETED | OUTPATIENT
Start: 2019-01-01 | End: 2019-01-01

## 2019-01-01 RX ORDER — POLYETHYLENE GLYCOL 3350 17 G/17G
17 POWDER, FOR SOLUTION ORAL DAILY PRN
Status: DISCONTINUED | OUTPATIENT
Start: 2019-01-01 | End: 2019-01-01 | Stop reason: HOSPADM

## 2019-01-01 RX ORDER — MIDODRINE HYDROCHLORIDE 5 MG/1
10 TABLET ORAL EVERY 8 HOURS SCHEDULED
Status: DISCONTINUED | OUTPATIENT
Start: 2019-01-01 | End: 2019-01-01 | Stop reason: HOSPADM

## 2019-01-01 RX ORDER — METOLAZONE 2.5 MG/1
2.5 TABLET ORAL DAILY
COMMUNITY
End: 2019-01-01 | Stop reason: HOSPADM

## 2019-01-01 RX ORDER — LEVOFLOXACIN 750 MG/1
750 TABLET ORAL EVERY OTHER DAY
Status: DISCONTINUED | OUTPATIENT
Start: 2019-01-01 | End: 2019-01-01 | Stop reason: HOSPADM

## 2019-01-01 RX ORDER — SODIUM CHLORIDE, SODIUM GLUCONATE, SODIUM ACETATE, POTASSIUM CHLORIDE, MAGNESIUM CHLORIDE, SODIUM PHOSPHATE, DIBASIC, AND POTASSIUM PHOSPHATE .53; .5; .37; .037; .03; .012; .00082 G/100ML; G/100ML; G/100ML; G/100ML; G/100ML; G/100ML; G/100ML
500 INJECTION, SOLUTION INTRAVENOUS ONCE
Status: COMPLETED | OUTPATIENT
Start: 2019-01-01 | End: 2019-01-01

## 2019-01-01 RX ORDER — ALBUMIN (HUMAN) 12.5 G/50ML
25 SOLUTION INTRAVENOUS ONCE
Status: COMPLETED | OUTPATIENT
Start: 2019-01-01 | End: 2019-01-01

## 2019-01-01 RX ORDER — ACETAMINOPHEN 325 MG/1
650 TABLET ORAL EVERY 4 HOURS PRN
Status: DISCONTINUED | OUTPATIENT
Start: 2019-01-01 | End: 2019-01-01 | Stop reason: HOSPADM

## 2019-01-01 RX ORDER — ALBUTEROL SULFATE 2.5 MG/3ML
2.5 SOLUTION RESPIRATORY (INHALATION) EVERY 6 HOURS PRN
Status: DISCONTINUED | OUTPATIENT
Start: 2019-01-01 | End: 2019-01-01 | Stop reason: HOSPADM

## 2019-01-01 RX ORDER — POTASSIUM CHLORIDE 20 MEQ/1
40 TABLET, EXTENDED RELEASE ORAL ONCE
Status: COMPLETED | OUTPATIENT
Start: 2019-01-01 | End: 2019-01-01

## 2019-01-01 RX ORDER — SACCHAROMYCES BOULARDII 250 MG
250 CAPSULE ORAL 2 TIMES DAILY
Status: DISCONTINUED | OUTPATIENT
Start: 2019-01-01 | End: 2019-01-01 | Stop reason: HOSPADM

## 2019-01-01 RX ORDER — HEPARIN SODIUM 1000 [USP'U]/ML
6000 INJECTION, SOLUTION INTRAVENOUS; SUBCUTANEOUS AS NEEDED
Status: DISCONTINUED | OUTPATIENT
Start: 2019-01-01 | End: 2019-01-01

## 2019-01-01 RX ORDER — METOLAZONE 5 MG/1
2.5 TABLET ORAL DAILY
Status: DISCONTINUED | OUTPATIENT
Start: 2019-01-01 | End: 2019-01-01

## 2019-01-01 RX ORDER — DOCUSATE SODIUM 100 MG/1
100 CAPSULE, LIQUID FILLED ORAL 2 TIMES DAILY
Qty: 10 CAPSULE | Refills: 0 | Status: SHIPPED | OUTPATIENT
Start: 2019-01-01 | End: 2019-01-01 | Stop reason: HOSPADM

## 2019-01-01 RX ORDER — BUSPIRONE HYDROCHLORIDE 5 MG/1
7.5 TABLET ORAL DAILY
Status: DISCONTINUED | OUTPATIENT
Start: 2019-01-01 | End: 2019-01-01 | Stop reason: HOSPADM

## 2019-01-01 RX ORDER — HEPARIN SODIUM 1000 [USP'U]/ML
4000 INJECTION, SOLUTION INTRAVENOUS; SUBCUTANEOUS AS NEEDED
Status: DISCONTINUED | OUTPATIENT
Start: 2019-01-01 | End: 2019-01-01

## 2019-01-01 RX ORDER — BUSPIRONE HYDROCHLORIDE 7.5 MG/1
7.5 TABLET ORAL DAILY
COMMUNITY
End: 2019-01-01 | Stop reason: HOSPADM

## 2019-01-01 RX ORDER — TAMSULOSIN HYDROCHLORIDE 0.4 MG/1
0.4 CAPSULE ORAL
COMMUNITY
Start: 2015-12-19 | End: 2019-01-01 | Stop reason: ALTCHOICE

## 2019-01-01 RX ORDER — CALCIUM CHLORIDE 100 MG/ML
1 INJECTION INTRAVENOUS; INTRAVENTRICULAR ONCE
Status: COMPLETED | OUTPATIENT
Start: 2019-01-01 | End: 2019-01-01

## 2019-01-01 RX ORDER — SODIUM CHLORIDE 9 MG/ML
125 INJECTION, SOLUTION INTRAVENOUS CONTINUOUS
Status: DISCONTINUED | OUTPATIENT
Start: 2019-01-01 | End: 2019-01-01

## 2019-01-01 RX ORDER — HEPARIN SODIUM 10000 [USP'U]/100ML
3-20 INJECTION, SOLUTION INTRAVENOUS
Status: DISCONTINUED | OUTPATIENT
Start: 2019-01-01 | End: 2019-01-01

## 2019-01-01 RX ORDER — BISACODYL 10 MG
10 SUPPOSITORY, RECTAL RECTAL DAILY PRN
Qty: 12 SUPPOSITORY | Refills: 0 | Status: SHIPPED | OUTPATIENT
Start: 2019-01-01 | End: 2019-01-01 | Stop reason: HOSPADM

## 2019-01-01 RX ORDER — CHOLECALCIFEROL (VITAMIN D3) 10 MCG
1 TABLET ORAL
Status: DISCONTINUED | OUTPATIENT
Start: 2019-01-01 | End: 2019-01-01 | Stop reason: HOSPADM

## 2019-01-01 RX ORDER — CALCIUM CHLORIDE 100 MG/ML
1 INJECTION INTRAVENOUS; INTRAVENTRICULAR ONCE
Status: DISCONTINUED | OUTPATIENT
Start: 2019-01-01 | End: 2019-01-01

## 2019-01-01 RX ORDER — ALBUMIN (HUMAN) 12.5 G/50ML
12.5 SOLUTION INTRAVENOUS ONCE
Status: COMPLETED | OUTPATIENT
Start: 2019-01-01 | End: 2019-01-01

## 2019-01-01 RX ORDER — METOPROLOL SUCCINATE 25 MG/1
12.5 TABLET, EXTENDED RELEASE ORAL 2 TIMES DAILY
Refills: 0 | Status: SHIPPED | OUTPATIENT
Start: 2019-01-01 | End: 2019-01-01 | Stop reason: HOSPADM

## 2019-01-01 RX ORDER — SODIUM CHLORIDE 9 MG/ML
75 INJECTION, SOLUTION INTRAVENOUS CONTINUOUS
Status: DISPENSED | OUTPATIENT
Start: 2019-01-01 | End: 2019-01-01

## 2019-01-01 RX ORDER — FUROSEMIDE 10 MG/ML
20 INJECTION INTRAMUSCULAR; INTRAVENOUS ONCE
Status: DISCONTINUED | OUTPATIENT
Start: 2019-01-01 | End: 2019-01-01

## 2019-01-01 RX ORDER — HEPARIN SODIUM 1000 [USP'U]/ML
3000 INJECTION, SOLUTION INTRAVENOUS; SUBCUTANEOUS AS NEEDED
Status: DISCONTINUED | OUTPATIENT
Start: 2019-01-01 | End: 2019-01-01

## 2019-01-01 RX ORDER — METOPROLOL SUCCINATE 50 MG/1
50 TABLET, EXTENDED RELEASE ORAL 2 TIMES DAILY
Status: DISCONTINUED | OUTPATIENT
Start: 2019-01-01 | End: 2019-01-01 | Stop reason: HOSPADM

## 2019-01-01 RX ORDER — LIDOCAINE HYDROCHLORIDE 10 MG/ML
INJECTION, SOLUTION EPIDURAL; INFILTRATION; INTRACAUDAL; PERINEURAL
Status: COMPLETED
Start: 2019-01-01 | End: 2019-01-01

## 2019-01-01 RX ORDER — CEFAZOLIN SODIUM 1 G/50ML
1000 SOLUTION INTRAVENOUS EVERY 12 HOURS
Status: DISCONTINUED | OUTPATIENT
Start: 2019-01-01 | End: 2019-01-01

## 2019-01-01 RX ORDER — POTASSIUM CHLORIDE 750 MG/1
10 TABLET, FILM COATED, EXTENDED RELEASE ORAL DAILY
COMMUNITY
End: 2019-01-01

## 2019-01-01 RX ORDER — CEFEPIME HYDROCHLORIDE 2 G/1
500 INJECTION, POWDER, FOR SOLUTION INTRAVENOUS EVERY 12 HOURS
Status: DISCONTINUED | OUTPATIENT
Start: 2019-01-01 | End: 2019-01-01

## 2019-01-01 RX ORDER — HEPARIN SODIUM 1000 [USP'U]/ML
2000 INJECTION, SOLUTION INTRAVENOUS; SUBCUTANEOUS AS NEEDED
Status: DISCONTINUED | OUTPATIENT
Start: 2019-01-01 | End: 2019-01-01

## 2019-01-01 RX ADMIN — ALBUMIN (HUMAN) 25 G: 0.25 INJECTION, SOLUTION INTRAVENOUS at 12:41

## 2019-01-01 RX ADMIN — DEXTROSE 50 ML/HR: 5 SOLUTION INTRAVENOUS at 00:58

## 2019-01-01 RX ADMIN — HEPARIN SODIUM 5000 UNITS: 5000 INJECTION, SOLUTION INTRAVENOUS; SUBCUTANEOUS at 22:36

## 2019-01-01 RX ADMIN — DOCUSATE SODIUM 100 MG: 100 CAPSULE, LIQUID FILLED ORAL at 17:25

## 2019-01-01 RX ADMIN — SODIUM CHLORIDE 100 ML/HR: 0.45 INJECTION, SOLUTION INTRAVENOUS at 12:31

## 2019-01-01 RX ADMIN — HEPARIN SODIUM 5000 UNITS: 5000 INJECTION, SOLUTION INTRAVENOUS; SUBCUTANEOUS at 06:02

## 2019-01-01 RX ADMIN — STANDARDIZED SENNA CONCENTRATE 8.6 MG: 8.6 TABLET ORAL at 08:12

## 2019-01-01 RX ADMIN — HEPARIN SODIUM 5000 UNITS: 5000 INJECTION, SOLUTION INTRAVENOUS; SUBCUTANEOUS at 13:57

## 2019-01-01 RX ADMIN — Medication 250 MG: at 08:02

## 2019-01-01 RX ADMIN — STANDARDIZED SENNA CONCENTRATE 8.6 MG: 8.6 TABLET ORAL at 22:47

## 2019-01-01 RX ADMIN — FUROSEMIDE 40 MG: 40 TABLET ORAL at 08:49

## 2019-01-01 RX ADMIN — HEPARIN SODIUM 5000 UNITS: 5000 INJECTION INTRAVENOUS; SUBCUTANEOUS at 22:48

## 2019-01-01 RX ADMIN — INSULIN HUMAN 10 UNITS: 100 INJECTION, SOLUTION PARENTERAL at 09:55

## 2019-01-01 RX ADMIN — HEPARIN SODIUM 5000 UNITS: 5000 INJECTION INTRAVENOUS; SUBCUTANEOUS at 13:29

## 2019-01-01 RX ADMIN — HEPARIN SODIUM 5000 UNITS: 5000 INJECTION, SOLUTION INTRAVENOUS; SUBCUTANEOUS at 06:05

## 2019-01-01 RX ADMIN — HEPARIN SODIUM AND DEXTROSE 12 UNITS/KG/HR: 10000; 5 INJECTION INTRAVENOUS at 17:44

## 2019-01-01 RX ADMIN — STANDARDIZED SENNA CONCENTRATE 8.6 MG: 8.6 TABLET ORAL at 22:23

## 2019-01-01 RX ADMIN — STANDARDIZED SENNA CONCENTRATE 8.6 MG: 8.6 TABLET ORAL at 22:37

## 2019-01-01 RX ADMIN — ASPIRIN 81 MG 81 MG: 81 TABLET ORAL at 09:18

## 2019-01-01 RX ADMIN — ASPIRIN 81 MG 81 MG: 81 TABLET ORAL at 09:09

## 2019-01-01 RX ADMIN — HEPARIN SODIUM 5000 UNITS: 5000 INJECTION INTRAVENOUS; SUBCUTANEOUS at 21:33

## 2019-01-01 RX ADMIN — DEXTROSE MONOHYDRATE 25 ML: 500 INJECTION PARENTERAL at 18:33

## 2019-01-01 RX ADMIN — FINASTERIDE 5 MG: 5 TABLET, FILM COATED ORAL at 08:38

## 2019-01-01 RX ADMIN — FINASTERIDE 5 MG: 5 TABLET, FILM COATED ORAL at 08:02

## 2019-01-01 RX ADMIN — SODIUM CHLORIDE 1000 ML: 0.9 INJECTION, SOLUTION INTRAVENOUS at 21:00

## 2019-01-01 RX ADMIN — LEVOTHYROXINE SODIUM 25 MCG: 25 TABLET ORAL at 05:50

## 2019-01-01 RX ADMIN — BUSPIRONE HYDROCHLORIDE 7.5 MG: 5 TABLET ORAL at 08:00

## 2019-01-01 RX ADMIN — DOCUSATE SODIUM 100 MG: 100 CAPSULE, LIQUID FILLED ORAL at 17:26

## 2019-01-01 RX ADMIN — Medication 250 MG: at 08:37

## 2019-01-01 RX ADMIN — HEPARIN SODIUM 5000 UNITS: 5000 INJECTION INTRAVENOUS; SUBCUTANEOUS at 22:24

## 2019-01-01 RX ADMIN — ASPIRIN 81 MG 81 MG: 81 TABLET ORAL at 08:16

## 2019-01-01 RX ADMIN — CEFEPIME 2000 MG: 2 INJECTION, POWDER, FOR SOLUTION INTRAVENOUS at 10:50

## 2019-01-01 RX ADMIN — Medication 250 MG: at 08:00

## 2019-01-01 RX ADMIN — HEPARIN SODIUM 5000 UNITS: 5000 INJECTION INTRAVENOUS; SUBCUTANEOUS at 14:43

## 2019-01-01 RX ADMIN — CEFEPIME 2000 MG: 2 INJECTION, POWDER, FOR SOLUTION INTRAVENOUS at 10:43

## 2019-01-01 RX ADMIN — HEPARIN SODIUM 5000 UNITS: 5000 INJECTION INTRAVENOUS; SUBCUTANEOUS at 13:47

## 2019-01-01 RX ADMIN — METOPROLOL SUCCINATE 12.5 MG: 25 TABLET, EXTENDED RELEASE ORAL at 08:06

## 2019-01-01 RX ADMIN — HEPARIN SODIUM 4000 UNITS: 1000 INJECTION INTRAVENOUS; SUBCUTANEOUS at 06:15

## 2019-01-01 RX ADMIN — CEFEPIME 2000 MG: 2 INJECTION, POWDER, FOR SOLUTION INTRAVENOUS at 10:57

## 2019-01-01 RX ADMIN — ASPIRIN 81 MG 81 MG: 81 TABLET ORAL at 08:26

## 2019-01-01 RX ADMIN — FUROSEMIDE 40 MG: 10 INJECTION, SOLUTION INTRAMUSCULAR; INTRAVENOUS at 07:58

## 2019-01-01 RX ADMIN — HEPARIN SODIUM 5000 UNITS: 5000 INJECTION INTRAVENOUS; SUBCUTANEOUS at 21:54

## 2019-01-01 RX ADMIN — Medication 250 MG: at 08:26

## 2019-01-01 RX ADMIN — SODIUM CHLORIDE 100 ML/HR: 0.45 INJECTION, SOLUTION INTRAVENOUS at 22:23

## 2019-01-01 RX ADMIN — METOPROLOL SUCCINATE 12.5 MG: 25 TABLET, EXTENDED RELEASE ORAL at 08:49

## 2019-01-01 RX ADMIN — DOCUSATE SODIUM 100 MG: 100 CAPSULE, LIQUID FILLED ORAL at 08:01

## 2019-01-01 RX ADMIN — HEPARIN SODIUM 5000 UNITS: 5000 INJECTION INTRAVENOUS; SUBCUTANEOUS at 22:18

## 2019-01-01 RX ADMIN — INSULIN HUMAN 10 UNITS: 100 INJECTION, SOLUTION PARENTERAL at 18:33

## 2019-01-01 RX ADMIN — SODIUM POLYSTYRENE SULFONATE 30 G: 1 POWDER ORAL; RECTAL at 17:35

## 2019-01-01 RX ADMIN — DOCUSATE SODIUM 100 MG: 100 CAPSULE, LIQUID FILLED ORAL at 08:27

## 2019-01-01 RX ADMIN — HEPARIN SODIUM 5000 UNITS: 5000 INJECTION, SOLUTION INTRAVENOUS; SUBCUTANEOUS at 05:14

## 2019-01-01 RX ADMIN — CEFEPIME 2000 MG: 2 INJECTION, POWDER, FOR SOLUTION INTRAVENOUS at 22:37

## 2019-01-01 RX ADMIN — LEVOFLOXACIN 750 MG: 750 TABLET, FILM COATED ORAL at 22:24

## 2019-01-01 RX ADMIN — HEPARIN SODIUM 5000 UNITS: 5000 INJECTION INTRAVENOUS; SUBCUTANEOUS at 21:15

## 2019-01-01 RX ADMIN — HEPARIN SODIUM 5000 UNITS: 5000 INJECTION INTRAVENOUS; SUBCUTANEOUS at 22:33

## 2019-01-01 RX ADMIN — HEPARIN SODIUM AND DEXTROSE 18 UNITS/KG/HR: 10000; 5 INJECTION INTRAVENOUS at 16:06

## 2019-01-01 RX ADMIN — HEPARIN SODIUM 5000 UNITS: 5000 INJECTION INTRAVENOUS; SUBCUTANEOUS at 05:32

## 2019-01-01 RX ADMIN — POTASSIUM CHLORIDE 40 MEQ: 1500 TABLET, EXTENDED RELEASE ORAL at 13:07

## 2019-01-01 RX ADMIN — Medication 1 CAPSULE: at 17:00

## 2019-01-01 RX ADMIN — Medication 250 MG: at 08:18

## 2019-01-01 RX ADMIN — DOCUSATE SODIUM 100 MG: 100 CAPSULE, LIQUID FILLED ORAL at 08:33

## 2019-01-01 RX ADMIN — ASPIRIN 81 MG 81 MG: 81 TABLET ORAL at 08:00

## 2019-01-01 RX ADMIN — DOCUSATE SODIUM 100 MG: 100 CAPSULE, LIQUID FILLED ORAL at 08:18

## 2019-01-01 RX ADMIN — HEPARIN SODIUM 5000 UNITS: 5000 INJECTION INTRAVENOUS; SUBCUTANEOUS at 14:26

## 2019-01-01 RX ADMIN — DOCUSATE SODIUM 100 MG: 100 CAPSULE, LIQUID FILLED ORAL at 17:10

## 2019-01-01 RX ADMIN — ALBUMIN (HUMAN) 25 G: 0.25 INJECTION, SOLUTION INTRAVENOUS at 20:07

## 2019-01-01 RX ADMIN — Medication 250 MG: at 17:10

## 2019-01-01 RX ADMIN — BUSPIRONE HYDROCHLORIDE 7.5 MG: 5 TABLET ORAL at 08:37

## 2019-01-01 RX ADMIN — Medication 250 MG: at 19:57

## 2019-01-01 RX ADMIN — SODIUM CHLORIDE 100 ML/HR: 0.45 INJECTION, SOLUTION INTRAVENOUS at 03:25

## 2019-01-01 RX ADMIN — STANDARDIZED SENNA CONCENTRATE 8.6 MG: 8.6 TABLET ORAL at 08:33

## 2019-01-01 RX ADMIN — ACETAMINOPHEN 650 MG: 325 TABLET, FILM COATED ORAL at 15:33

## 2019-01-01 RX ADMIN — DEXTROSE MONOHYDRATE 25 ML: 25 INJECTION, SOLUTION INTRAVENOUS at 09:55

## 2019-01-01 RX ADMIN — ASPIRIN 81 MG 81 MG: 81 TABLET ORAL at 08:37

## 2019-01-01 RX ADMIN — LEVOTHYROXINE SODIUM 25 MCG: 25 TABLET ORAL at 05:09

## 2019-01-01 RX ADMIN — HEPARIN SODIUM 5000 UNITS: 5000 INJECTION INTRAVENOUS; SUBCUTANEOUS at 21:14

## 2019-01-01 RX ADMIN — STANDARDIZED SENNA CONCENTRATE 8.6 MG: 8.6 TABLET ORAL at 08:26

## 2019-01-01 RX ADMIN — Medication 250 MG: at 08:16

## 2019-01-01 RX ADMIN — BUSPIRONE HYDROCHLORIDE 7.5 MG: 5 TABLET ORAL at 08:16

## 2019-01-01 RX ADMIN — METOPROLOL TARTRATE 12.5 MG: 25 TABLET ORAL at 22:30

## 2019-01-01 RX ADMIN — DOCUSATE SODIUM 100 MG: 100 CAPSULE, LIQUID FILLED ORAL at 18:01

## 2019-01-01 RX ADMIN — Medication 250 MG: at 17:39

## 2019-01-01 RX ADMIN — HEPARIN SODIUM 5000 UNITS: 5000 INJECTION INTRAVENOUS; SUBCUTANEOUS at 05:22

## 2019-01-01 RX ADMIN — Medication 1 CAPSULE: at 15:35

## 2019-01-01 RX ADMIN — DOCUSATE SODIUM 100 MG: 100 CAPSULE, LIQUID FILLED ORAL at 08:45

## 2019-01-01 RX ADMIN — FINASTERIDE 5 MG: 5 TABLET, FILM COATED ORAL at 08:18

## 2019-01-01 RX ADMIN — FINASTERIDE 5 MG: 5 TABLET, FILM COATED ORAL at 08:26

## 2019-01-01 RX ADMIN — METOPROLOL SUCCINATE 50 MG: 50 TABLET, EXTENDED RELEASE ORAL at 17:59

## 2019-01-01 RX ADMIN — METOPROLOL TARTRATE 12.5 MG: 25 TABLET ORAL at 08:00

## 2019-01-01 RX ADMIN — ASPIRIN 81 MG 81 MG: 81 TABLET ORAL at 08:12

## 2019-01-01 RX ADMIN — HEPARIN SODIUM 5000 UNITS: 5000 INJECTION INTRAVENOUS; SUBCUTANEOUS at 13:07

## 2019-01-01 RX ADMIN — MIDODRINE HYDROCHLORIDE 10 MG: 5 TABLET ORAL at 05:32

## 2019-01-01 RX ADMIN — HEPARIN SODIUM 5000 UNITS: 5000 INJECTION INTRAVENOUS; SUBCUTANEOUS at 06:19

## 2019-01-01 RX ADMIN — Medication 250 MG: at 17:11

## 2019-01-01 RX ADMIN — HEPARIN SODIUM 5000 UNITS: 5000 INJECTION, SOLUTION INTRAVENOUS; SUBCUTANEOUS at 13:34

## 2019-01-01 RX ADMIN — ACETAMINOPHEN 650 MG: 325 TABLET, FILM COATED ORAL at 11:11

## 2019-01-01 RX ADMIN — FINASTERIDE 5 MG: 5 TABLET, FILM COATED ORAL at 08:33

## 2019-01-01 RX ADMIN — DOCUSATE SODIUM 100 MG: 100 CAPSULE, LIQUID FILLED ORAL at 08:00

## 2019-01-01 RX ADMIN — METOPROLOL SUCCINATE 12.5 MG: 25 TABLET, EXTENDED RELEASE ORAL at 17:12

## 2019-01-01 RX ADMIN — CEFEPIME HYDROCHLORIDE 1000 MG: 2 INJECTION, POWDER, FOR SOLUTION INTRAVENOUS at 12:38

## 2019-01-01 RX ADMIN — OXYCODONE HYDROCHLORIDE 2.5 MG: 5 TABLET ORAL at 13:29

## 2019-01-01 RX ADMIN — BUSPIRONE HYDROCHLORIDE 7.5 MG: 5 TABLET ORAL at 08:13

## 2019-01-01 RX ADMIN — Medication 250 MG: at 18:23

## 2019-01-01 RX ADMIN — CEFEPIME 2000 MG: 2 INJECTION, POWDER, FOR SOLUTION INTRAVENOUS at 23:46

## 2019-01-01 RX ADMIN — STANDARDIZED SENNA CONCENTRATE 8.6 MG: 8.6 TABLET ORAL at 22:07

## 2019-01-01 RX ADMIN — LEVOTHYROXINE SODIUM 25 MCG: 25 TABLET ORAL at 05:54

## 2019-01-01 RX ADMIN — STANDARDIZED SENNA CONCENTRATE 8.6 MG: 8.6 TABLET ORAL at 21:14

## 2019-01-01 RX ADMIN — Medication 250 MG: at 18:01

## 2019-01-01 RX ADMIN — FINASTERIDE 5 MG: 5 TABLET, FILM COATED ORAL at 08:00

## 2019-01-01 RX ADMIN — DOCUSATE SODIUM 100 MG: 100 CAPSULE, LIQUID FILLED ORAL at 17:03

## 2019-01-01 RX ADMIN — FUROSEMIDE 40 MG: 10 INJECTION, SOLUTION INTRAMUSCULAR; INTRAVENOUS at 12:22

## 2019-01-01 RX ADMIN — STANDARDIZED SENNA CONCENTRATE 8.6 MG: 8.6 TABLET ORAL at 21:41

## 2019-01-01 RX ADMIN — ALBUMIN (HUMAN) 12.5 G: 12.5 SOLUTION INTRAVENOUS at 20:19

## 2019-01-01 RX ADMIN — Medication 250 MG: at 08:49

## 2019-01-01 RX ADMIN — ASPIRIN 81 MG 81 MG: 81 TABLET ORAL at 08:11

## 2019-01-01 RX ADMIN — SODIUM CHLORIDE 250 ML: 0.9 INJECTION, SOLUTION INTRAVENOUS at 14:39

## 2019-01-01 RX ADMIN — STANDARDIZED SENNA CONCENTRATE 8.6 MG: 8.6 TABLET ORAL at 21:33

## 2019-01-01 RX ADMIN — Medication 250 MG: at 17:26

## 2019-01-01 RX ADMIN — FUROSEMIDE 20 MG: 20 TABLET ORAL at 11:23

## 2019-01-01 RX ADMIN — FINASTERIDE 5 MG: 5 TABLET, FILM COATED ORAL at 08:45

## 2019-01-01 RX ADMIN — HEPARIN SODIUM 5000 UNITS: 5000 INJECTION INTRAVENOUS; SUBCUTANEOUS at 05:50

## 2019-01-01 RX ADMIN — HEPARIN SODIUM 5000 UNITS: 5000 INJECTION, SOLUTION INTRAVENOUS; SUBCUTANEOUS at 06:36

## 2019-01-01 RX ADMIN — DOCUSATE SODIUM 100 MG: 100 CAPSULE, LIQUID FILLED ORAL at 09:18

## 2019-01-01 RX ADMIN — ASPIRIN 81 MG 81 MG: 81 TABLET ORAL at 08:01

## 2019-01-01 RX ADMIN — Medication 250 MG: at 10:08

## 2019-01-01 RX ADMIN — FINASTERIDE 5 MG: 5 TABLET, FILM COATED ORAL at 08:49

## 2019-01-01 RX ADMIN — STANDARDIZED SENNA CONCENTRATE 8.6 MG: 8.6 TABLET ORAL at 21:54

## 2019-01-01 RX ADMIN — Medication 250 MG: at 20:19

## 2019-01-01 RX ADMIN — ASPIRIN 81 MG 81 MG: 81 TABLET ORAL at 10:09

## 2019-01-01 RX ADMIN — FUROSEMIDE 40 MG: 10 INJECTION, SOLUTION INTRAMUSCULAR; INTRAVENOUS at 14:39

## 2019-01-01 RX ADMIN — DOCUSATE SODIUM 100 MG: 100 CAPSULE, LIQUID FILLED ORAL at 17:39

## 2019-01-01 RX ADMIN — HEPARIN SODIUM 5000 UNITS: 5000 INJECTION INTRAVENOUS; SUBCUTANEOUS at 13:21

## 2019-01-01 RX ADMIN — SODIUM CHLORIDE, SODIUM GLUCONATE, SODIUM ACETATE, POTASSIUM CHLORIDE, MAGNESIUM CHLORIDE, SODIUM PHOSPHATE, DIBASIC, AND POTASSIUM PHOSPHATE 125 ML/HR: .53; .5; .37; .037; .03; .012; .00082 INJECTION, SOLUTION INTRAVENOUS at 14:06

## 2019-01-01 RX ADMIN — FINASTERIDE 5 MG: 5 TABLET, FILM COATED ORAL at 08:16

## 2019-01-01 RX ADMIN — HEPARIN SODIUM 5000 UNITS: 5000 INJECTION INTRAVENOUS; SUBCUTANEOUS at 05:54

## 2019-01-01 RX ADMIN — FINASTERIDE 5 MG: 5 TABLET, FILM COATED ORAL at 09:08

## 2019-01-01 RX ADMIN — SODIUM CHLORIDE 1000 ML: 0.9 INJECTION, SOLUTION INTRAVENOUS at 17:36

## 2019-01-01 RX ADMIN — HEPARIN SODIUM 5000 UNITS: 5000 INJECTION INTRAVENOUS; SUBCUTANEOUS at 05:41

## 2019-01-01 RX ADMIN — BUSPIRONE HYDROCHLORIDE 7.5 MG: 5 TABLET ORAL at 08:11

## 2019-01-01 RX ADMIN — HEPARIN SODIUM 6000 UNITS: 1000 INJECTION INTRAVENOUS; SUBCUTANEOUS at 16:04

## 2019-01-01 RX ADMIN — DOCUSATE SODIUM 100 MG: 100 CAPSULE, LIQUID FILLED ORAL at 08:11

## 2019-01-01 RX ADMIN — LORAZEPAM 0.5 MG: 2 INJECTION INTRAMUSCULAR; INTRAVENOUS at 16:17

## 2019-01-01 RX ADMIN — FUROSEMIDE 40 MG: 10 INJECTION, SOLUTION INTRAMUSCULAR; INTRAVENOUS at 08:01

## 2019-01-01 RX ADMIN — ASPIRIN 81 MG 81 MG: 81 TABLET ORAL at 08:18

## 2019-01-01 RX ADMIN — METOPROLOL SUCCINATE 12.5 MG: 25 TABLET, EXTENDED RELEASE ORAL at 18:23

## 2019-01-01 RX ADMIN — ASPIRIN 81 MG 81 MG: 81 TABLET ORAL at 08:50

## 2019-01-01 RX ADMIN — SODIUM CHLORIDE, SODIUM GLUCONATE, SODIUM ACETATE, POTASSIUM CHLORIDE, MAGNESIUM CHLORIDE, SODIUM PHOSPHATE, DIBASIC, AND POTASSIUM PHOSPHATE 500 ML: .53; .5; .37; .037; .03; .012; .00082 INJECTION, SOLUTION INTRAVENOUS at 05:40

## 2019-01-01 RX ADMIN — METOPROLOL SUCCINATE 50 MG: 50 TABLET, EXTENDED RELEASE ORAL at 08:00

## 2019-01-01 RX ADMIN — Medication 250 MG: at 17:46

## 2019-01-01 RX ADMIN — Medication 250 MG: at 08:33

## 2019-01-01 RX ADMIN — LEVOTHYROXINE SODIUM 25 MCG: 25 TABLET ORAL at 05:32

## 2019-01-01 RX ADMIN — ALBUMIN (HUMAN) 12.5 G: 0.25 INJECTION, SOLUTION INTRAVENOUS at 09:56

## 2019-01-01 RX ADMIN — FUROSEMIDE 40 MG: 40 TABLET ORAL at 13:46

## 2019-01-01 RX ADMIN — LEVOFLOXACIN 750 MG: 750 TABLET, FILM COATED ORAL at 08:16

## 2019-01-01 RX ADMIN — FINASTERIDE 5 MG: 5 TABLET, FILM COATED ORAL at 09:18

## 2019-01-01 RX ADMIN — Medication 250 MG: at 17:51

## 2019-01-01 RX ADMIN — NOREPINEPHRINE BITARTRATE 1 MCG/MIN: 1 INJECTION INTRAVENOUS at 01:35

## 2019-01-01 RX ADMIN — SODIUM CHLORIDE, SODIUM GLUCONATE, SODIUM ACETATE, POTASSIUM CHLORIDE, MAGNESIUM CHLORIDE, SODIUM PHOSPHATE, DIBASIC, AND POTASSIUM PHOSPHATE 125 ML/HR: .53; .5; .37; .037; .03; .012; .00082 INJECTION, SOLUTION INTRAVENOUS at 21:16

## 2019-01-01 RX ADMIN — MIDODRINE HYDROCHLORIDE 5 MG: 5 TABLET ORAL at 12:12

## 2019-01-01 RX ADMIN — SODIUM CHLORIDE 75 ML/HR: 0.9 INJECTION, SOLUTION INTRAVENOUS at 11:14

## 2019-01-01 RX ADMIN — Medication 250 MG: at 18:02

## 2019-01-01 RX ADMIN — STANDARDIZED SENNA CONCENTRATE 8.6 MG: 8.6 TABLET ORAL at 08:45

## 2019-01-01 RX ADMIN — LEVOTHYROXINE SODIUM 25 MCG: 25 TABLET ORAL at 05:33

## 2019-01-01 RX ADMIN — METOLAZONE 2.5 MG: 5 TABLET ORAL at 08:03

## 2019-01-01 RX ADMIN — DOCUSATE SODIUM 100 MG: 100 CAPSULE, LIQUID FILLED ORAL at 08:49

## 2019-01-01 RX ADMIN — DOCUSATE SODIUM 100 MG: 100 CAPSULE, LIQUID FILLED ORAL at 18:23

## 2019-01-01 RX ADMIN — VANCOMYCIN HYDROCHLORIDE 1000 MG: 1 INJECTION, SOLUTION INTRAVENOUS at 15:46

## 2019-01-01 RX ADMIN — LIDOCAINE HYDROCHLORIDE 1 MG: 10 INJECTION, SOLUTION EPIDURAL; INFILTRATION; INTRACAUDAL; PERINEURAL at 01:35

## 2019-01-01 RX ADMIN — HEPARIN SODIUM 5000 UNITS: 5000 INJECTION, SOLUTION INTRAVENOUS; SUBCUTANEOUS at 21:31

## 2019-01-01 RX ADMIN — HEPARIN SODIUM 5000 UNITS: 5000 INJECTION INTRAVENOUS; SUBCUTANEOUS at 15:16

## 2019-01-01 RX ADMIN — MIDODRINE HYDROCHLORIDE 5 MG: 5 TABLET ORAL at 09:00

## 2019-01-01 RX ADMIN — ASPIRIN 81 MG 81 MG: 81 TABLET ORAL at 08:33

## 2019-01-01 RX ADMIN — MIDODRINE HYDROCHLORIDE 5 MG: 5 TABLET ORAL at 11:11

## 2019-01-01 RX ADMIN — BUSPIRONE HYDROCHLORIDE 7.5 MG: 5 TABLET ORAL at 08:27

## 2019-01-01 RX ADMIN — METOPROLOL SUCCINATE 50 MG: 50 TABLET, EXTENDED RELEASE ORAL at 11:23

## 2019-01-01 RX ADMIN — STANDARDIZED SENNA CONCENTRATE 8.6 MG: 8.6 TABLET ORAL at 08:11

## 2019-01-01 RX ADMIN — SENNOSIDES 8.6 MG: 8.6 TABLET, FILM COATED ORAL at 21:31

## 2019-01-01 RX ADMIN — HEPARIN SODIUM 5000 UNITS: 5000 INJECTION INTRAVENOUS; SUBCUTANEOUS at 05:09

## 2019-01-01 RX ADMIN — SODIUM CHLORIDE, SODIUM GLUCONATE, SODIUM ACETATE, POTASSIUM CHLORIDE, MAGNESIUM CHLORIDE, SODIUM PHOSPHATE, DIBASIC, AND POTASSIUM PHOSPHATE 500 ML: .53; .5; .37; .037; .03; .012; .00082 INJECTION, SOLUTION INTRAVENOUS at 08:00

## 2019-01-01 RX ADMIN — POLYETHYLENE GLYCOL 3350 17 G: 17 POWDER, FOR SOLUTION ORAL at 11:23

## 2019-01-01 RX ADMIN — DOCUSATE SODIUM 100 MG: 100 CAPSULE, LIQUID FILLED ORAL at 19:57

## 2019-01-01 RX ADMIN — SODIUM CHLORIDE, SODIUM GLUCONATE, SODIUM ACETATE, POTASSIUM CHLORIDE, MAGNESIUM CHLORIDE, SODIUM PHOSPHATE, DIBASIC, AND POTASSIUM PHOSPHATE 500 ML: .53; .5; .37; .037; .03; .012; .00082 INJECTION, SOLUTION INTRAVENOUS at 05:53

## 2019-01-01 RX ADMIN — FINASTERIDE 5 MG: 5 TABLET, FILM COATED ORAL at 10:09

## 2019-01-01 RX ADMIN — METOPROLOL SUCCINATE 12.5 MG: 25 TABLET, EXTENDED RELEASE ORAL at 17:39

## 2019-01-01 RX ADMIN — BUSPIRONE HYDROCHLORIDE 7.5 MG: 5 TABLET ORAL at 08:34

## 2019-01-01 RX ADMIN — BUSPIRONE HYDROCHLORIDE 7.5 MG: 5 TABLET ORAL at 08:45

## 2019-01-01 RX ADMIN — CEFEPIME 2000 MG: 2 INJECTION, POWDER, FOR SOLUTION INTRAVENOUS at 22:40

## 2019-01-01 RX ADMIN — CEFAZOLIN SODIUM 2000 MG: 2 SOLUTION INTRAVENOUS at 22:32

## 2019-01-01 RX ADMIN — ALBUTEROL SULFATE 2.5 MG: 2.5 SOLUTION RESPIRATORY (INHALATION) at 03:43

## 2019-01-01 RX ADMIN — MIDODRINE HYDROCHLORIDE 10 MG: 5 TABLET ORAL at 15:35

## 2019-01-01 RX ADMIN — HEPARIN SODIUM 5000 UNITS: 5000 INJECTION INTRAVENOUS; SUBCUTANEOUS at 14:00

## 2019-01-01 RX ADMIN — DOCUSATE SODIUM 100 MG: 100 CAPSULE, LIQUID FILLED ORAL at 08:16

## 2019-01-01 RX ADMIN — Medication 250 MG: at 08:45

## 2019-01-01 RX ADMIN — ASPIRIN 325 MG ORAL TABLET 325 MG: 325 PILL ORAL at 22:29

## 2019-01-01 RX ADMIN — Medication 250 MG: at 08:12

## 2019-01-01 RX ADMIN — LORAZEPAM 0.5 MG: 2 INJECTION INTRAMUSCULAR; INTRAVENOUS at 18:58

## 2019-01-01 RX ADMIN — DOCUSATE SODIUM 100 MG: 100 CAPSULE, LIQUID FILLED ORAL at 18:02

## 2019-01-01 RX ADMIN — HEPARIN SODIUM 5000 UNITS: 5000 INJECTION INTRAVENOUS; SUBCUTANEOUS at 05:42

## 2019-01-01 RX ADMIN — DOCUSATE SODIUM 100 MG: 100 CAPSULE, LIQUID FILLED ORAL at 11:23

## 2019-01-01 RX ADMIN — HEPARIN SODIUM 5000 UNITS: 5000 INJECTION INTRAVENOUS; SUBCUTANEOUS at 14:06

## 2019-01-01 RX ADMIN — Medication 250 MG: at 17:03

## 2019-01-01 RX ADMIN — FINASTERIDE 5 MG: 5 TABLET, FILM COATED ORAL at 08:12

## 2019-01-01 RX ADMIN — HEPARIN SODIUM 5000 UNITS: 5000 INJECTION INTRAVENOUS; SUBCUTANEOUS at 13:31

## 2019-01-01 RX ADMIN — VANCOMYCIN HYDROCHLORIDE 1000 MG: 1 INJECTION, SOLUTION INTRAVENOUS at 16:19

## 2019-01-01 RX ADMIN — HEPARIN SODIUM 5000 UNITS: 5000 INJECTION INTRAVENOUS; SUBCUTANEOUS at 21:41

## 2019-01-01 RX ADMIN — VANCOMYCIN HYDROCHLORIDE 1250 MG: 5 INJECTION, POWDER, LYOPHILIZED, FOR SOLUTION INTRAVENOUS at 16:18

## 2019-01-01 RX ADMIN — METOPROLOL TARTRATE 12.5 MG: 25 TABLET ORAL at 20:49

## 2019-01-01 RX ADMIN — HEPARIN SODIUM 5000 UNITS: 5000 INJECTION INTRAVENOUS; SUBCUTANEOUS at 05:33

## 2019-01-01 RX ADMIN — HEPARIN SODIUM 5000 UNITS: 5000 INJECTION, SOLUTION INTRAVENOUS; SUBCUTANEOUS at 21:20

## 2019-01-01 RX ADMIN — HEPARIN SODIUM 5000 UNITS: 5000 INJECTION INTRAVENOUS; SUBCUTANEOUS at 06:01

## 2019-01-01 RX ADMIN — FUROSEMIDE 40 MG: 10 INJECTION, SOLUTION INTRAMUSCULAR; INTRAVENOUS at 17:12

## 2019-01-01 RX ADMIN — ASPIRIN 81 MG 81 MG: 81 TABLET ORAL at 08:45

## 2019-01-01 RX ADMIN — NOREPINEPHRINE BITARTRATE 2 MCG/MIN: 1 INJECTION INTRAVENOUS at 12:38

## 2019-01-01 RX ADMIN — Medication 250 MG: at 08:11

## 2019-01-01 RX ADMIN — HEPARIN SODIUM 5000 UNITS: 5000 INJECTION, SOLUTION INTRAVENOUS; SUBCUTANEOUS at 21:13

## 2019-01-01 RX ADMIN — HEPARIN SODIUM 5000 UNITS: 5000 INJECTION INTRAVENOUS; SUBCUTANEOUS at 22:07

## 2019-01-01 RX ADMIN — HEPARIN SODIUM 5000 UNITS: 5000 INJECTION, SOLUTION INTRAVENOUS; SUBCUTANEOUS at 15:16

## 2019-01-01 RX ADMIN — HEPARIN SODIUM 5000 UNITS: 5000 INJECTION INTRAVENOUS; SUBCUTANEOUS at 14:53

## 2019-01-01 RX ADMIN — CEFEPIME HYDROCHLORIDE 1000 MG: 2 INJECTION, POWDER, FOR SOLUTION INTRAVENOUS at 13:48

## 2019-01-01 RX ADMIN — STANDARDIZED SENNA CONCENTRATE 8.6 MG: 8.6 TABLET ORAL at 08:38

## 2019-01-01 RX ADMIN — FINASTERIDE 5 MG: 5 TABLET, FILM COATED ORAL at 08:11

## 2019-01-01 RX ADMIN — CALCIUM CHLORIDE 1 G: 100 INJECTION INTRAVENOUS; INTRAVENTRICULAR at 17:35

## 2019-01-01 RX ADMIN — CALCIUM CARBONATE (ANTACID) CHEW TAB 500 MG 1000 MG: 500 CHEW TAB at 18:58

## 2019-01-01 RX ADMIN — LEVOTHYROXINE SODIUM 25 MCG: 25 TABLET ORAL at 05:22

## 2019-01-01 RX ADMIN — DOCUSATE SODIUM 100 MG: 100 CAPSULE, LIQUID FILLED ORAL at 10:09

## 2019-01-01 RX ADMIN — FUROSEMIDE 40 MG: 10 INJECTION, SOLUTION INTRAMUSCULAR; INTRAVENOUS at 20:16

## 2019-01-01 RX ADMIN — STANDARDIZED SENNA CONCENTRATE 8.6 MG: 8.6 TABLET ORAL at 08:16

## 2019-01-01 RX ADMIN — METOPROLOL SUCCINATE 12.5 MG: 25 TABLET, EXTENDED RELEASE ORAL at 10:08

## 2019-01-01 RX ADMIN — CALCIUM GLUCONATE 1 G: 98 INJECTION, SOLUTION INTRAVENOUS at 09:55

## 2019-01-01 RX ADMIN — STANDARDIZED SENNA CONCENTRATE 8.6 MG: 8.6 TABLET ORAL at 08:00

## 2019-01-01 RX ADMIN — Medication 250 MG: at 22:29

## 2019-01-01 RX ADMIN — SENNOSIDES 8.6 MG: 8.6 TABLET, FILM COATED ORAL at 22:36

## 2019-01-18 PROBLEM — R26.2 AMBULATORY DYSFUNCTION: Status: ACTIVE | Noted: 2019-01-01

## 2019-01-18 PROBLEM — N17.9 ACUTE KIDNEY INJURY (HCC): Status: ACTIVE | Noted: 2019-01-01

## 2019-01-18 NOTE — ASSESSMENT & PLAN NOTE
· Lives alone, ambulates short distances with a walker and otherwise with a wheelchair  · Has a visiting nurse that comes to the house to help with lower extremity wound care  · Lili Dryer while ambulating to the door in the ED  · Obtain PT/OT evaluation, fall precautions

## 2019-01-18 NOTE — ASSESSMENT & PLAN NOTE
· POA  Baseline creatinine approximately 1 0-1 2  · Presents with creatinine elevated at 1 84  Admits to poor oral fluid intake and is also on Lasix daily  · Hold Lasix and lisinopril at this time, monitor renal function    Hold off on IV fluids in view of CHF history  · Avoid hypotension and nephrotoxins

## 2019-01-18 NOTE — ASSESSMENT & PLAN NOTE
· No acute discomfort session at the time  · Most recent echocardiogram on 6/21/18 showed LVEF 40%, mild diffuse hypokinesis, moderate pulmonary hypertension  · Hold Lasix in view of AK I, monitor daily weights, I's and O's, 2 g sodium diet

## 2019-01-18 NOTE — ASSESSMENT & PLAN NOTE
· History chronic venous insufficiency of bilateral lower extremity with PVD  · Purplish discoloration noted to bilateral lower extremity which patient states is chronic and unchanged  · Now with blisters and weepy lesions on the right foot  · Elevate lower extremity, obtain wound care eval

## 2019-01-18 NOTE — ED NOTES
Pt ambulating with walker back to room, this ER tech was on his left side  Pts gait steady, pt lost balance, started leaning to the right, and fell to his knees  Pt assisted into wheel chair after Dr Theodore Dominguez assessed the pt  Pt denies injury  Pt sitting in wheelchair in pt room  Call bell in reach        Angie Galdamez  01/18/19 3769

## 2019-01-18 NOTE — H&P
H&P- Gail Bal 1935, 82 y o  male MRN: [id number]    Unit/Bed#: MARY JANE Encounter: [id number]    Primary Care Provider: Sherri Villalba DO   Date and time admitted to hospital: 1/18/2019 11:51 AM    * Acute kidney injury ([facility] [address] )   Assessment & Plan    · POA  Baseline creatinine approximately 1 0-1 2  · Presents with creatinine elevated at 1 84  Admits to poor oral fluid intake and is also on Lasix daily  · Hold Lasix and lisinopril at this time, monitor renal function  Hold off on IV fluids in view of CHF history  · Avoid hypotension and nephrotoxins     Ambulatory dysfunction   Assessment & Plan    · Lives alone, ambulates short distances with a walker and otherwise with a wheelchair  · Has a visiting nurse that comes to the house to help with lower extremity wound care  · Khris Kenney while ambulating to the door in the ED  · Obtain PT/OT evaluation, fall precautions     Chronic venous insufficiency   Assessment & Plan    · History chronic venous insufficiency of bilateral lower extremity with PVD  · Purplish discoloration noted to bilateral lower extremity which patient states is chronic and unchanged  · Now with blisters and weepy lesions on the right foot  · Elevate lower extremity, obtain wound care eval     Chronic systolic congestive heart failure (HCC)   Assessment & Plan    · No acute discomfort session at the time  · Most recent echocardiogram on 6/21/18 showed LVEF 40%, mild diffuse hypokinesis, moderate pulmonary hypertension  · Hold Lasix in view of AK I, monitor daily weights, I's and O's, 2 g sodium diet     Essential hypertension   Assessment & Plan    · Hold lisinopril and Lasix secondary to RICKI  Continue metoprolol         VTE Prophylaxis: Heparin       Code Status:  Full code    POLST: POLST form is not discussed and not completed at this time  Anticipated Length of Stay:  Patient will be admitted on an Inpatient basis with an anticipated length of stay of  > 2 midnights  Justification for Hospital Stay:  Acute kidney injury    Chief Complaint:     Lower extremity swelling, blister/weeping lesions    History of Present Illness:  Morenita Dalal is a 82 y o  male who presents from home after his visiting nurse noted him with weepy lesions and blisters to the right lower extremity which is not his baseline  The patient has an underlying history of chronic venous insufficiency bilateral lower extremity  He lives alone and is ambulatory with a walker and wheelchair  Patient otherwise denies any complaints and reports that he feels at his baseline  While in the ED ambulating with a walker to his room, patient's lost his balance and fell sustaining skin tear injury to the right forearm  There was no head injury  He denies any pain at this time  Review of Systems   Constitutional: Negative  HENT: Negative  Respiratory: Negative  Cardiovascular: Negative  Gastrointestinal: Negative  Genitourinary: Negative  Musculoskeletal: Negative  Skin: Positive for color change and wound  Neurological: Negative  All other systems reviewed and are negative        Past Medical History:   Diagnosis Date    Cardiomyopathy [facility])     with EF of 40 % 12/15    Cellulitis of lower extremity 6/21/2018    Cervical spinal stenosis     Cervical spondylosis     [facility]congestive [facility] failure) ([facility])     Chronic venous stasis dermatitis of both lower extremities     DDD (degenerative disc disease), lumbar     Degenerative cervical disc     Discitis of lumbar region     Elevated troponin 6/21/2018    History of BPH     Hypertension     Lumbar canal stenosis     OA (osteoarthritis)     Osteomyelitis ([facility])     Paroxysmal A-fib ([facility])     Pneumonia of both lower lobes due to infectious organism ([facility] [address] ) 6/21/2018    Spinal stenosis     Urinary retention        Past Surgical History:   Procedure Laterality Date    BACK SURGERY      FOOT SURGERY      HIP SURGERY      DE COLONOSCOPY FLX DX W/COLLJ SPEC WHEN PFRMD N/A 11/3/2016    Procedure: COLONOSCOPY;  Surgeon: Trace Madrid MD;  Location: [facility]; Service: Gastroenterology    ROTATOR CUFF REPAIR         Family History:  Non contributory    Home Meds:  all medications and allergies reviewed    Allergies: Allergies   Allergen Reactions    Morphine          Marital Status: Single     History   Alcohol Use No     History   Smoking Status    Former Smoker   Smokeless Tobacco    Never Used     History   Drug Use No           Physical Exam:   Vitals:   Blood Pressure: 115/60 (01/18/19 1343)  Pulse: 72 (01/18/19 1330)  Temperature: 97 5 °F (36 4 °C) (01/18/19 1200)  Temp Source: Oral (01/18/19 1200)  Respirations: 16 (01/18/19 1330)  Height: 5' 10" (177 8 cm) (01/18/19 1155)  Weight - Scale: 88 5 kg (195 lb 1 7 oz) (01/18/19 1155)  SpO2: 96 % (01/18/19 1330)    Physical Exam   Constitutional: He is oriented to person, place, and time  He appears well-developed  No distress  Frail appearing, chronically ill looking   HENT:   Head: Normocephalic and atraumatic  Mouth/Throat: Mucous membranes are dry  Abnormal dentition  Eyes: Conjunctivae and EOM are normal    Neck: Normal range of motion  Neck supple  Cardiovascular: Normal rate and regular rhythm  Murmur heard  Pulmonary/Chest: Effort normal  He has decreased breath sounds in the right middle field and the right lower field  He has no wheezes  He has no rhonchi  He has no rales  Abdominal: Soft  Bowel sounds are normal  He exhibits no distension and no mass  There is no tenderness  Musculoskeletal: He exhibits edema  He exhibits no tenderness  +2-3 bilateral lower extremity edema   Neurological: He is alert and oriented to person, place, and time  Skin: Skin is warm and dry  Abrasion noted  He is not diaphoretic  No cyanosis  Purplish discoloration bilateral lower extremity which patient reports is chronic    Burst blister dorsum right foot and shin with chantal, serous discharge   Vitals reviewed  Lab Results: I have personally reviewed pertinent reports  Results from last 7 days  Lab Units 01/18/19  1225   WBC Thousand/uL 5 35   HEMOGLOBIN g/dL 13 4   HEMATOCRIT % 43 5   PLATELETS Thousands/uL 94*   NEUTROS PCT % 79*   LYMPHS PCT % 12*   MONOS PCT % 9   EOS PCT % 0       Results from last 7 days  Lab Units 01/18/19  1225   POTASSIUM mmol/L 5 1   CHLORIDE mmol/L 104   CO2 mmol/L 32   BUN mg/dL 74*   CREATININE mg/dL 1 84*   CALCIUM mg/dL 9 3     ** Please Note: Dragon 360 Dictation voice to text software may have been used in the creation of this document   **

## 2019-01-19 NOTE — ASSESSMENT & PLAN NOTE
· No acute discomfort at this time  · Most recent echocardiogram on 6/21/18 showed LVEF 40%, mild diffuse hypokinesis, moderate pulmonary hypertension  · Lasix currently on hold in view of AK I, monitor daily weights, I's and O's, 2 g sodium diet

## 2019-01-19 NOTE — ASSESSMENT & PLAN NOTE
· History chronic venous insufficiency of bilateral lower extremity with PVD  · Purplish discoloration noted to bilateral lower extremity which patient states is chronic and unchanged  · Edema, blisters and weepy lesions improved with lower extremity elevation  · Continue local wound care

## 2019-01-19 NOTE — PROGRESS NOTES
Progress Note Zaria Martin 1935, 82 y o  male MRN: [id number]    Unit/Bed#: -01 Encounter: [id number]    Primary Care Provider: Timothy Swan DO   Date and time admitted to hospital: 1/18/2019 11:51 AM    * Acute kidney injury ([facility] [address] )   Assessment & Plan    · POA  Baseline creatinine approximately 1 0-1 2  · Suspected likely secondary to poor oral fluid intake while on Lasix daily  Creatinine unchanged today  · Limited IV hydration today  · Continue to hold Lasix and lisinopril at this time and monitor renal function  · Avoid hypotension and nephrotoxins     Ambulatory dysfunction   Assessment & Plan    · Lives alone, ambulates short distances with a walker and otherwise with a wheelchair  · Has a visiting nurse that comes to the house to help with lower extremity wound care  · Ramon Frederick while ambulating to the door in the ED  · For PT/OT evaluation  Continue fall precautions     Chronic venous insufficiency   Assessment & Plan    · History chronic venous insufficiency of bilateral lower extremity with PVD  · Purplish discoloration noted to bilateral lower extremity which patient states is chronic and unchanged  · Edema, blisters and weepy lesions improved with lower extremity elevation  · Continue local wound care     Chronic systolic congestive heart failure (HCC)   Assessment & Plan    · No acute discomfort at this time  · Most recent echocardiogram on 6/21/18 showed LVEF 40%, mild diffuse hypokinesis, moderate pulmonary hypertension  · Lasix currently on hold in view of AK I, monitor daily weights, I's and O's, 2 g sodium diet     Essential hypertension   Assessment & Plan    · Lasix and lisinopril on hold secondary to RICKI  Continue metoprolol  · BP stable       VTE Pharmacologic Prophylaxis:   Pharmacologic: Heparin  Mechanical VTE Prophylaxis in Place: No    Patient Centered Rounds: I have performed bedside rounds with nursing staff today      Discussions with Specialists or Other Care Team Provider:  Nursing    Education and Discussions with Family / Patient:  Patient    Current Length of Stay: 1 day(s)    Current Patient Status: Inpatient   Certification Statement: The patient will continue to require additional inpatient hospital stay due to Above diagnosis and care plan    Discharge Plan:  Anticipate discharge home tomorrow if remains stable and cleared from the PT standpoint    Code Status: Level 1 - Full Code      Subjective:   Seen and evaluated  Offers no new complaints  Denies chest pain or shortness of breath  No lower extremity pain, no fever or chills  Objective:     Vitals:   Temp (24hrs), Av 5 °F (36 4 °C), Min:97 5 °F (36 4 °C), Max:97 6 °F (36 4 °C)    Temp:  [97 5 °F (36 4 °C)-97 6 °F (36 4 °C)] 97 5 °F (36 4 °C)  HR:  [63-86] 80  Resp:  [16] 16  BP: ()/(21-80) 124/69  SpO2:  [95 %-99 %] 97 %  Body mass index is 27 29 kg/m²  Input and Output Summary (last 24 hours):        Intake/Output Summary (Last 24 hours) at 19 0954  Last data filed at 19 6733   Gross per 24 hour   Intake              370 ml   Output              425 ml   Net              -55 ml       Physical Exam:  General Appearance:    Alert, chronically ill-appearing, no distress, appropriately responsive   Head:    Normocephalic, without obvious abnormality, atraumatic, mucous membranes moist    Eyes:    Conjunctiva/corneas clear, EOM's intact   Neck:   Supple   Lungs:     Decreased breath sounds right lower lung fields, no wheezes, no rales     Heart:    Regular rate and rhythm, S1 and S2    Abdomen:     Soft, non-tender, nondistended   Extremities:   Decreased bilateral lower extremity edema, purplish discoloration unchanged, blisters improved to dorsum of the right foot, right shin with dressing over broken blister, right elbow skin tear from fall in ED yesterday   Neurologic:  nonfocal         Additional Data:     Labs:      Results from last 7 days  Lab Units 19  1225   WBC Thousand/uL 5 35   HEMOGLOBIN g/dL 13 4   HEMATOCRIT % 43 5   PLATELETS Thousands/uL 94*   NEUTROS PCT % 79*   LYMPHS PCT % 12*   MONOS PCT % 9   EOS PCT % 0       Results from last 7 days  Lab Units 01/19/19  0448   POTASSIUM mmol/L 5 0   CHLORIDE mmol/L 104   CO2 mmol/L 30   BUN mg/dL 77*   CREATININE mg/dL 1 84*   CALCIUM mg/dL 9 2           * I Have Reviewed All Lab Data Listed Above  * Additional Pertinent Lab Tests Reviewed: Dex 66 Admission Reviewed    Cultures:   Blood Culture:   Lab Results   Component Value Date    BLOODCX No Growth After 5 Days  06/21/2018    BLOODCX No Growth After 5 Days  06/21/2018     Urine Culture: No results found for: URINECX  Sputum Culture: No components found for: SPUTUMCX  Wound Culture: No results found for: WOUNDCULT    Last 24 Hours Medication List:     Current Facility-Administered Medications:  acetaminophen 650 mg Oral Q6H PRN James Wilkinson MD   aspirin 81 mg Oral Daily James Wilkinson MD   bifidobacterium infantis 1 capsule Oral Daily James Wilkinson MD   finasteride 5 mg Oral Daily James Wilkinson MD   heparin (porcine) 5,000 Units Subcutaneous Q8H [facility] James Wilkinson MD   metoprolol succinate 50 mg Oral BID James Wilkinson MD   sodium chloride 75 mL/hr Intravenous Continuous James Wilkinson MD        Today, Patient Was Seen By: James Wilkinson MD    ** Please Note: Dragon 360 Dictation voice to text software may have been used in the creation of this document   **

## 2019-01-19 NOTE — ASSESSMENT & PLAN NOTE
· POA  Baseline creatinine approximately 1 0-1 2  · Suspected likely secondary to poor oral fluid intake while on Lasix daily    Creatinine unchanged today  · Limited IV hydration today  · Continue to hold Lasix and lisinopril at this time and monitor renal function  · Avoid hypotension and nephrotoxins

## 2019-01-19 NOTE — ED PROVIDER NOTES
History  Chief Complaint   Patient presents with    Foot Pain     pt presents from home with increased swelling in legs b/l with blisters on his feet  82 yr male - sent in by vn - who does wound care for ble edema-  - sent in today for increasing ble- bleeding from lle--  Pt states ble edema is biggest that they have ever been- complaint with all meds- is on loop diuretic/ diet--  deneis any other comps - no cp/sob- normal amount of urination -- no melena/brbpr        History provided by:  Patient   used: No        Prior to Admission Medications   Prescriptions Last Dose Informant Patient Reported?  Taking?   aspirin 81 mg chewable tablet   No Yes   Sig: Chew 1 tablet (81 mg total) daily   bifidobacterium infantis (ALIGN) capsule   Yes Yes   Sig: Take 1 capsule by mouth daily   finasteride (PROSCAR) 5 mg tablet   Yes Yes   Sig: Take 5 mg by mouth daily   furosemide (LASIX) 40 mg tablet   No Yes   Sig: Take 1 tablet (40 mg total) by mouth daily   lisinopril (ZESTRIL) 10 mg tablet   No Yes   Sig: Take 1 tablet (10 mg total) by mouth daily   metoprolol succinate (TOPROL-XL) 50 mg 24 hr tablet   No Yes   Sig: Take 1 tablet (50 mg total) by mouth 2 (two) times a day      Facility-Administered Medications: None       Past Medical History:   Diagnosis Date    Cardiomyopathy ([facility] [address] )     with EF of 40 % 12/15    Cellulitis of lower extremity 6/21/2018    Cervical spinal stenosis     Cervical spondylosis     [facility]     Chronic venous stasis dermatitis of both lower extremities     DDD (degenerative disc disease), lumbar     Degenerative cervical disc     Discitis of lumbar region     Elevated troponin 6/21/2018    History of BPH     Hypertension     Lumbar canal stenosis     OA (osteoarthritis)     [facility]el[facility] ([facility])     [facility]oxysmal A-fib ([facility])     Pneumonia of both lower lobes due to infectious organism ([facility] [address] ) 6/21/2018    Spinal stenosis     Urinary retention        Past Surgical History:   Procedure Laterality Date    BACK SURGERY      FOOT SURGERY      HIP SURGERY      FL COLONOSCOPY FLX DX W/COLLJ SPEC WHEN PFRMD N/A 11/3/2016    Procedure: COLONOSCOPY;  Surgeon: David Douglas MD;  Location: [facility]; Service: Gastroenterology    ROTATOR CUFF REPAIR         History reviewed  No pertinent family history  I have reviewed and agree with the history as documented  Social History   Substance Use Topics    Smoking status: Former Smoker    Smokeless tobacco: Never Used    Alcohol use No        Review of Systems   Constitutional: Negative  HENT: Negative  Eyes: Negative  Respiratory: Negative  Cardiovascular: Positive for leg swelling  Gastrointestinal: Negative  Endocrine: Negative  Genitourinary: Negative  Musculoskeletal: Negative  Skin: Negative  Allergic/Immunologic: Negative  Neurological: Negative  Hematological: Negative  Psychiatric/Behavioral: Negative  Physical Exam  Physical Exam   Constitutional: He is oriented to person, place, and time  No distress  avss- pulse ox 98 % on ra- interpretation is normal- no intervention- cachetic   HENT:   Head: Normocephalic and atraumatic  Eyes: Pupils are equal, round, and reactive to light  Conjunctivae and EOM are normal  Right eye exhibits no discharge  Left eye exhibits no discharge  No scleral icterus  Mm pink   Neck: Normal range of motion  Neck supple  No JVD present  No tracheal deviation present  No thyromegaly present  Cardiovascular: Normal rate  Exam reveals no gallop and no friction rub  Murmur heard  ireg irreg rhythm   Pulmonary/Chest: Effort normal  No stridor  No respiratory distress  He has no wheezes  He has no rales  He exhibits no tenderness  Mild decreased bs-b   Abdominal: Soft  Bowel sounds are normal  He exhibits no distension and no mass  There is no tenderness  There is no rebound and no guarding  No hernia  Musculoskeletal: He exhibits edema  He exhibits no tenderness or deformity  2 plus ble pedal/pretibial edema-   Small area of rle mid tibial bleeding- no signs of cellulitis=- necrosis- no crepitus-- sym cool bel to lower thigh area- normal distal sensation cap refill- unable to palp any dp /pt pulses   Lymphadenopathy:     He has no cervical adenopathy  Neurological: He is alert and oriented to person, place, and time  No cranial nerve deficit or sensory deficit  He exhibits normal muscle tone  Coordination normal    Skin: Skin is warm  Capillary refill takes less than 2 seconds  No rash noted  He is not diaphoretic  No erythema  No pallor  Psychiatric: He has a normal mood and affect  His behavior is normal    Nursing note and vitals reviewed        Vital Signs  ED Triage Vitals   Temperature Pulse Respirations Blood Pressure SpO2   01/18/19 1200 01/18/19 1155 01/18/19 1155 01/18/19 1206 01/18/19 1155   97 5 °F (36 4 °C) 63 16 121/80 99 %      Temp Source Heart Rate Source Patient Position - Orthostatic VS BP Location FiO2 (%)   01/18/19 1200 01/18/19 1155 01/18/19 1330 01/18/19 1330 --   Oral Monitor Lying Left arm       Pain Score       01/18/19 1155       No Pain           Vitals:    01/18/19 1341 01/18/19 1342 01/18/19 1343 01/18/19 1511   BP: (!) 70/28 (!) 39/21 115/60 125/59   Pulse:    75   Patient Position - Orthostatic VS: Lying Lying Lying Lying       Visual Acuity      ED Medications  Medications   finasteride (PROSCAR) tablet 5 mg (5 mg Oral Not Given 1/18/19 1608)   bifidobacterium infantis (ALIGN) capsule 1 capsule (1 capsule Oral Not Given 1/18/19 1607)   aspirin chewable tablet 81 mg (81 mg Oral Not Given 1/18/19 1608)   metoprolol succinate (TOPROL-XL) 24 hr tablet 50 mg (50 mg Oral Given 1/18/19 1759)   heparin (porcine) subcutaneous injection 5,000 Units (5,000 Units Subcutaneous Not Given 1/18/19 1759)   acetaminophen (TYLENOL) tablet 650 mg (not administered)   sodium chloride 0 9 % bolus 250 mL (0 mL Intravenous Stopped 1/18/19 6326)       Diagnostic Studies  Results Reviewed     Procedure Component Value Units Date/Time    Platelet count [[id number]]     Lab Status:  No result Specimen:  Blood     CBC and differential [[id number]]  (Abnormal) Collected:  01/18/19 1225    Lab Status:  Final result Specimen:  Blood from Arm, Right Updated:  01/18/19 1311     WBC 5 35 Thousand/uL      RBC 4 39 Million/uL      Hemoglobin 13 4 g/dL      Hematocrit 43 5 %      MCV 99 (H) fL      MCH 30 5 pg      MCHC 30 8 (L) g/dL      RDW 16 8 (H) %      MPV 13 1 (H) fL      Platelets 94 (L) Thousands/uL      nRBC 0 /100 WBCs      Neutrophils Relative 79 (H) %      Immat GRANS % 0 %      Lymphocytes Relative 12 (L) %      Monocytes Relative 9 %      Eosinophils Relative 0 %      Basophils Relative 0 %      Neutrophils Absolute 4 21 Thousands/µL      Immature Grans Absolute 0 01 Thousand/uL      Lymphocytes Absolute 0 62 Thousands/µL      Monocytes Absolute 0 49 Thousand/µL      Eosinophils Absolute 0 01 Thousand/µL      Basophils Absolute 0 01 Thousands/µL     Basic metabolic panel [[id number]]  (Abnormal) Collected:  01/18/19 1225    Lab Status:  Final result Specimen:  Blood from Arm, Right Updated:  01/18/19 1242     Sodium 142 mmol/L      Potassium 5 1 mmol/L      Chloride 104 mmol/L      CO2 32 mmol/L      ANION GAP 6 mmol/L      BUN 74 (H) mg/dL      Creatinine 1 84 (H) mg/dL      Glucose 68 mg/dL      Calcium 9 3 mg/dL      eGFR 34 ml/min/1 73sq m     Narrative:         National Kidney Disease Education Program recommendations are as follows:  GFR calculation is accurate only with a steady state creatinine  Chronic Kidney disease less than 60 ml/min/1 73 sq  meters  Kidney failure less than 15 ml/min/1 73 sq  meters                   No orders to display              Procedures  Procedures       Phone Contacts  ED Phone Contact    ED Course  ED Course as of Jan 18 1901 Fri Jan 18, 2019   1203 - ER MD NOTE-  6/17 BILATERAL ARTERIAL DOPPLER  RESULTS REVIEWED- 6/18 LABS REVIEWED BY ER MD    5207 - ER MD NOTE- BP NOTED IN BOTH ARMS/LEGS    - ER MD NOTE- PT UNABLE TO AMBULATE WITH WALKER IN ER-- WILL ADMIT                                MDM  CritCare Time    Disposition  Final diagnoses:   Acute kidney injury ([facility] )   Lower leg edema   Peripheral vascular disease of lower extremity ([place] [id number] )   Ambulatory dysfunction     Time reflects when diagnosis was documented in both MDM as applicable and the Disposition within this note     Time User Action Codes Description Comment    1/18/2019  2:00 PM [place] Add [N17 9] Acute kidney injury ([facility] )     1/18/2019  2:01 PM Moris MONET Add [R60 0] Lower leg edema     1/18/2019  2:01 PM Moris MONET Add [I73 9] Peripheral vascular disease of lower extremity ([facility] )     1/18/2019  2:01 PM [place] Add [R26 2] Ambulatory dysfunction     1/18/2019  2:53 PM Ivory Walker A Modify [N17 9] Acute kidney injury ([facility] )     1/18/2019  2:53 PM Ivory Walker A Modify [R26 2] Ambulatory dysfunction       ED Disposition     ED Disposition Condition Comment    Admit  Case was discussed with DR DAMON and the patient's admission status was agreed to be Admission Status: observation status to the service of Dr DAMON           Follow-up Information    None         Current Discharge Medication List      CONTINUE these medications which have NOT CHANGED    Details   aspirin 81 mg chewable tablet Chew 1 tablet (81 mg total) daily  Qty: 30 tablet, Refills: 0    Associated Diagnoses: Acute on chronic systolic congestive heart failure (HCC)      bifidobacterium infantis (ALIGN) capsule Take 1 capsule by mouth daily      finasteride (PROSCAR) 5 mg tablet Take 5 mg by mouth daily      furosemide (LASIX) 40 mg tablet Take 1 tablet (40 mg total) by mouth daily  Qty: 30 tablet, Refills: 0    Associated Diagnoses: Acute on chronic systolic congestive heart failure (HCC)      lisinopril (ZESTRIL) 10 mg tablet Take 1 tablet (10 mg total) by mouth daily  Qty: 30 tablet, Refills: 0    Associated Diagnoses: Acute on chronic systolic congestive heart failure (HCC)      metoprolol succinate (TOPROL-XL) 50 mg 24 hr tablet Take 1 tablet (50 mg total) by mouth 2 (two) times a day  Qty: 60 tablet, Refills: 0    Associated Diagnoses: Acute on chronic systolic congestive heart failure (HCC)           No discharge procedures on file      ED Provider  Electronically Signed by           Brianna Varela MD  01/18/19 5813

## 2019-01-19 NOTE — ASSESSMENT & PLAN NOTE
· Lives alone, ambulates short distances with a walker and otherwise with a wheelchair  · Has a visiting nurse that comes to the house to help with lower extremity wound care  · Aleta Pennington while ambulating to the door in the ED  · For PT/OT evaluation    Continue fall precautions

## 2019-01-19 NOTE — PROGRESS NOTES
Patient's BP 90/68 manually  Spoke with MAYELA Mendoza - continue fluids @ 75mL/hr  and hold blood pressure medication for SBP <130  Patient asymptomatic

## 2019-01-19 NOTE — UTILIZATION REVIEW
Initial Clinical Review    Admission: Date/Time/Statement: 1/18/19 @ 1403   Orders Placed This Encounter   Procedures    Inpatient Admission (expected length of stay for this patient is greater than two midnights)     Standing Status:   Standing     Number of Occurrences:   1     Order Specific Question:   Admitting Physician     Answer:   Marilyn Restrepo [[id number]]     Order Specific Question:   Level of Care     Answer:   Med Surg [16]     Order Specific Question:   Estimated length of stay     Answer:   More than 2 Midnights     Order Specific Question:   Certification     Answer:   I certify that inpatient services are medically necessary for this patient for a duration of greater than two midnights  See H&P and MD Progress Notes for additional information about the patient's course of treatment  ED: Date/Time/Mode of Arrival:   ED Arrival Information     Expected Arrival Acuity Means of Arrival Escorted By Service Admission Type    - 1/18/2019 11:50 Urgent Ambulance [facility] General Medicine Urgent    Arrival Complaint    blisters        Chief Complaint:   Chief Complaint   Patient presents with    Foot Pain     pt presents from home with increased swelling in legs b/l with blisters on his feet  History of Illness:82 yo male to ED from home via EMS after visiting nurse noted weepy lesions and blisters RLE       ED Vital Signs:   ED Triage Vitals   Temperature Pulse Respirations Blood Pressure SpO2   01/18/19 1200 01/18/19 1155 01/18/19 1155 01/18/19 1206 01/18/19 1155   97 5 °F (36 4 °C) 63 16 121/80 99 %      Temp Source Heart Rate Source Patient Position - Orthostatic VS BP Location FiO2 (%)   01/18/19 1200 01/18/19 1155 01/18/19 1330 01/18/19 1330 --   Oral Monitor Lying Left arm       Pain Score       01/18/19 1155       No Pain        Wt Readings from Last 1 Encounters:   01/18/19 86 3 kg (190 lb 3 2 oz)     Vital Signs (abnormal): wnl     Pertinent Labs/Diagnostic Test Results: BUN leslie   74 1 84  ED Treatment:   Medication Administration from 01/18/2019 1150 to 01/18/2019 1512       Date/Time Order Dose Route Action Action by Comments     01/18/2019 1456 sodium chloride 0 9 % bolus 250 mL 0 mL Intravenous Stopped Bruce Lisa RN      01/18/2019 1439 sodium chloride 0 9 % bolus 250 mL 250 mL Intravenous New Bag Bruce Lisa RN         Past Medical/Surgical History: Active Ambulatory Problems     Diagnosis Date Noted    Paroxysmal A-fib [facility])     Essential hypertension     Chronic systolic congestive heart failure (HCC)     Cardiomyopathy (HCC)     Chronic venous insufficiency 06/21/2018    Type 2 myocardial infarction ([facility] [address] ) 06/21/2018    Pleural effusion, bilateral 06/22/2018       Past Medical History:   Diagnosis Date    Cardiomyopathy [facility])     Cellulitis of lower extremity 6/21/2018    Cervical spinal stenosis     Cervical spondylosis     [facility]est[facility]     Chronic venous stasis dermatitis of both lower extremities     DDD (degenerative disc disease), lumbar     Degenerative cervical disc     Discitis of lumbar region     Elevated troponin 6/21/2018    History of BPH     Hypertension     Lumbar canal stenosis     OA (osteoarthritis)     Osteomyelitis (HCC)     Paroxysmal A-fib (HCC)     Pneumonia of both lower lobes due to infectious organism ([address] ) 6/21/2018    Spinal stenosis     Urinary retention      Admitting Diagnosis: Foot pain [M79 673]  Acute kidney injury ([address] ) [N17 9]  Lower leg edema [R60 0]  Ambulatory dysfunction [R26 2]  Peripheral vascular disease of lower extremity ([address] ) [I73 9]  Age/Sex: 82 y o  male  Assessment/Plan:  * Acute kidney injury ([facility] [address] )   Assessment & Plan     · POA  Baseline creatinine approximately 1 0-1 2  · Presents with creatinine elevated at 1 84  Admits to poor oral fluid intake and is also on Lasix daily  · Hold Lasix and lisinopril at this time, monitor renal function    Hold off on IV fluids in view of CHF history  · Avoid hypotension and nephrotoxins    Ambulatory dysfunction   Assessment & Plan     · Lives alone, ambulates short distances with a walker and otherwise with a wheelchair  · Has a visiting nurse that comes to the house to help with lower extremity wound care  · Jannet Power while ambulating to the door in the ED  · Obtain PT/OT evaluation, fall precautions    Chronic venous insufficiency   Assessment & Plan     · History chronic venous insufficiency of bilateral lower extremity with PVD  · Purplish discoloration noted to bilateral lower extremity which patient states is chronic and unchanged  · Now with blisters and weepy lesions on the right foot  · Elevate lower extremity, obtain wound care eval    Chronic systolic congestive heart failure (HCC)   Assessment & Plan     · No acute discomfort session at the time  · Most recent echocardiogram on 6/21/18 showed LVEF 40%, mild diffuse hypokinesis, moderate pulmonary hypertension  · Hold Lasix in view of AK I, monitor daily weights, I's and O's, 2 g sodium diet    Essential hypertension   Assessment & Plan     · Hold lisinopril and Lasix secondary to RICKI  Continue metoprolol     Anticipated Length of Stay:  Patient will be admitted on an Inpatient basis with an anticipated length of stay of  > 2 midnights     Justification for Hospital Stay:  Acute kidney injury         Admission Orders:  Scheduled Meds:   Current Facility-Administered Medications:  acetaminophen 650 mg Oral Q6H PRN    aspirin 81 mg Oral Daily    bifidobacterium infantis 1 capsule Oral Daily    finasteride 5 mg Oral Daily    heparin (porcine) 5,000 Units Subcutaneous Q8H [facility] & assisted    metoprolol succinate 50 mg Oral BID    sodium chloride 75 mL/hr Intravenous Continuous      I&O   Elevate ext   OT PT eval   Up w/ assist  Cardiac diet   Wound care consult   1/19 BMP   BUN creat  77 1 84

## 2019-01-20 NOTE — PLAN OF CARE
Potential for Falls     Patient will remain free of falls Progressing        Prexisting or High Potential for Compromised Skin Integrity     Skin integrity is maintained or improved  Skin integrity is maintained or improved Progressing     Progressing

## 2019-01-20 NOTE — OCCUPATIONAL THERAPY NOTE
[address]  Evaluation     Patient Name: Cayden Bravo  [id number] Date: 1/20/2019  Problem List  Patient Active Problem List   Diagnosis    Paroxysmal A-fib ([facility] [address] )    Essential hypertension    Chronic systolic congestive heart failure ([facility] [address] )    Cardiomyopathy ([facility] [address] [facility]    Chronic venous insufficiency    Type 2 myocardial infarction ([facility] [address] [facility]    Pleural effusion, bilateral    Acute kidney injury ([facility] [address] )    Ambulatory dysfunction     Past Medical History  Past Medical History:   Diagnosis Date    Cardiomyopathy [facility])     with EF of 40 % 12/15    Cellulitis of lower extremity 6/21/2018    Cervical spinal stenosis     Cervical spondylosis     [facility]congestive heart failure) ([facility])     Chronic venous stasis dermatitis of both lower extremities     DDD (degenerative disc disease), lumbar     Degenerative cervical disc     Discitis of lumbar region     Elevated troponin 6/21/2018    History of BPH     Hypertension     Lumbar canal stenosis     OA (osteoarthritis)     Osteomyelitis (HCC)     Paroxysmal A-fib (HCC)     Pneumonia of both lower lobes due to infectious organism ([facility] [address] ) 6/21/2018    Spinal stenosis     Urinary retention      Past Surgical History  Past Surgical History:   Procedure Laterality Date    BACK SURGERY      FOOT SURGERY      HIP SURGERY      IN COLONOSCOPY FLX DX W/COLLJ SPEC WHEN PFRMD N/A 11/3/2016    Procedure: COLONOSCOPY;  Surgeon: Rubina Tabares MD;  [facility]: [facility]; Service: Gastroenterology    ROTATOR CUFF REPAIR           01/20/19 0943   Note Type   Note type Eval only   Restrictions/Precautions   Weight Bearing Precautions Per Order No   Other Precautions Bed Alarm; Chair Alarm;Pain; Fall Risk;Telemetry   Pain Assessment   Pain Assessment 0-10   Pain Score 8   Pain Type Acute pain;Chronic pain   Pain Location Leg   Pain Orientation Right   Effect of Pain on Daily Activities limits activity tolerance, I w/ ADL performance   Patient's Stated Pain Goal No pain Hospital Pain Intervention(s) Repositioned; Ambulation/increased activity; Emotional support   Response to Interventions tolerated   Home Living   Type of Home House; Other (Comment)  (bi-level)   Home Layout Multi-level; Able to live on main level with bedroom/bathroom; Ramped entrance   Bathroom Shower/Tub Tub/shower unit   Bathroom Toilet Standard   Bathroom Equipment Other (Comment); Shower chair  (grab bar in bathroom behind door)   [address] [facility] Accessibility Accessible via walker   [address] [facility]; Wheelchair-manual;Hospital bed;Grab bars   Additional Comments Pt reports living alone in bi - level house w/ ramped entrance  Pt reports able to completed ADL on one level  Pt reports having aides in AM/PM to assist w/ ADL, and god son assisted w/ taking care of house and shopping   Prior Function   Level of [place] Needs assistance with IADLs   Lives With Alone   Receives Help From Family;Home health   ADL Assistance Needs assistance   IADLs Needs assistance   Falls in the last 6 months 1 to 4  (pt reports 1 fall in ED)   Vocational Retired   Comments Pt reports short distance ambulation using RW to bathroom, kitchen, but primarily uses w/c for functional mobility  Pt needs assistance w/ ADL/ IADL and reports having aides to assist in AM/PM  Pt has hospital bed   Lifestyle   Autonomy Pt reports assistance from aide and god son w/ ADL/IADL PTA, but able to prepare / heat meals in microwave, and use walker vs w/c for functional mobility   Reciprocal Relationships Pt reports living alone, supportive god son  Aides 5 days a week in AM/PM   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[address] [facility] Pt reports enjoying adult coloring   ADL   [address]  Modified independent   [address]; Increased time to complete   Grooming Assistance 5  Supervision/Setup   Grooming Deficit Setup; Increased time to complete;Supervision/safety   UB Bathing Assistance Unable to assess   LB Bathing Assistance Unable to assess   UB Dressing Assistance 4  Minimal Assistance   UB Dressing Deficit Steadying;Setup; Fasteners; Increased time to complete   LB Dressing Assistance 2  Maximal Assistance   LB Dressing Deficit Don/doff R sock; Don/doff L sock; Increased time to complete;Supervision/safety;Setup   Bed Mobility   Supine to Sit 3  Moderate assistance   Additional items Assist x 2;HOB elevated;LE management;Verbal cues; Bedrails; Increased time required   Sit to Supine Unable to assess   Additional Comments Pt seated OOB in chair post eval w/ call bell in reach, chair alarm activated, and needs met  Transfers   Sit to Stand 3  Moderate assistance   Additional items Assist x 2; Increased time required;Verbal cues   Stand to Sit 3  Moderate assistance   Additional items Assist x 2; Increased time required;Verbal cues   Stand pivot 3  Moderate assistance   Additional items Assist x 2; Increased time required;Verbal cues  (using RW)   Additional Comments Pt benefits from cues for hand placement, tech  Balance   Static Sitting Fair   Static Standing Zero  (A x2 using RW)   Activity Tolerance   Activity Tolerance Patient limited by pain   Medical Staff Made Aware spoke to PTYuly and [facility]   Nurse Made Aware spoke to RN, Celeste Ross   RUE Assessment   RUE Assessment X  (trace AROM shoulder (premorbid from rotator cuff injury))   RUE Strength   RUE Overall Strength Deficits  (grasp 3+/5)   LUE Assessment   LUE Assessment X  (limited AROM shoulder approx 45* shoulder flex)   LUE Strength   LUE Overall Strength Deficits  (grasp 3+/5)   Hand Function   Gross Motor Coordination Functional   Fine Motor Coordination Impaired  (arthritic changes B UE)   Sensation   Light Touch Not tested   Sharp/Dull Not tested   Vision-Basic Assessment   Current Vision Wears glasses all the time   Cognition   Overall Cognitive Status [facility]   Arousal/Participation Alert; Cooperative   Attention Within functional limits  (hard of hearing)   Orientation Level Oriented to person;Oriented to place;Oriented to situation; Other (Comment)  (able to report month, year, President but not date)   Memory Within functional limits   Following Commands Follows multistep commands with increased time or repetition   Comments Identified pt by full name and birthdate  Pt able to provide social history / home set- up  Pt alert, cooperative, and oriented  Able to participate in conversation abour work history  Assessment   Limitation Decreased ADL status; Decreased UE strength;Decreased UE ROM; Decreased endurance;Decreased self-care trans;Decreased fine motor control   Assessment Pt is an 83yo male [facility] 1[facility]18[facility]  Pt presents w/ acute kidney injury and signficant PMH impacting his occupational performance including  Hip, back, foot sx, osteomylelitis, R rotator cuff injury, a-fib, hx MI, systolic CHF, cervical spinal stenosis, HTN  Pt reports living alone in bi -level house w/ ramp to enter PTA  Pt reports using RW for short distances (to /from bathroom, kitchen) but primarily uses w/c for functional mobility  Pt needs assistance w/ ADL / IADL from aides in AM/PM, and supportive god son assist w/ IADL (shopping, taking care of house)  Upon eval, pt required mod A x2 to complete bed mobility supine to sit, and mod A x2 sit <> stand from EOB  Pt required mod A x 2 to complete functional transfer using RW bed to chair  Pt complete LBD w/ max A  Pt able to participate in conversation about work history, and home set- up  Pt alert, cooperative, pleasant, and oriented to person, place, situation  Pt presents w/ decresed activity tolerance, decreased endurance, decreased UE / LE strength, increased LE edema, increased pain, decreased UE ROM, decreased sitting tolerance, decreased standing tolerance impacting his I w/ dressing, bathing, functional mobility, functional transfers, activity engagement, food prep / clean up, clothing mgmt, and oral hygiene  Pt would benefit from OT while in acute care to address deficits, and post acute rehab when medically stable for discharge from acute care to return to baseline level of I  Will continue to follow   Plan   Treatment Interventions ADL retraining;Functional transfer training;UE strengthening/ROM; Patient/family training;Equipment evaluation/education; Activityengagement;Continued evaluation   Goal Expiration Date 01/27/19   OT Frequency 3-5x/wk   Recommendation   OT Discharge Recommendation Other (Comment)  (to post acute rehab)   Equipment Recommended Other (comment)  (pt reports having grab bar, hospital bed, shower chair)   OT - OK to Discharge (to post acute rehab)   Barthel Index   Feeding 10   Bathing 0   Grooming Score 0   Dressing Score 5   Bladder Score 0   Bowels Score 10   Toilet Use Score 5   Transfers (Bed/Chair) Score 5   Mobility (Level Surface) Score 0   Stairs Score 0   Barthel Index Score 35   Modified Gunnison Scale   Modified Gunnison Scale 4   Pt goals to be met in 7 days:  1  Pt will complete bed mobility supine to sit w/ min A X 1 to max I w/ ADL performance  2  Pt will complete functional transfers to bed, chair, and commode using AD w/ min A x1 to max I w/ ADL performance and minimize burden of care to return to PLOF  3  Pt will complete functional tub / shower transfers using DME and AD w/ min A x1 to max I w/ bathing  4  Pt will complete UBD/LBD w/ min A x1 after set- up while seated unsupported at EOB w/ fair + balance to max I w/ ADL performance  5  Pt will demonstrate increased functional standing tolerance for at least 10 minutes using AD while engaged in oral hygiene w/ S after set- up to max I w/ food prep / clean up   6  Pt will demonstrate good attention and verbalize understanding of safety precautions for ADL performance  7   Pt will demonstrate increased activity tolerance and sitting tolerance for at least 20 minutes while engaged in ADL's w/ out signs / symptoms of fatigue   Allison Kodi OTR/L

## 2019-01-20 NOTE — DISCHARGE INSTR - OTHER ORDERS
Wound care/skin care Discharge Plan:   1  Right lower leg ruptured blister- Cleanse  apply Dermagran dressing, cover with dry gauzeand kerlix roll  Secure with tape  Change QOD  2  Right forearm skin tear- Cleanse  Apply Dermagran dressing, cover with dry gauze and kerlix roll  Secure with tape  Change QOD  3  Frequent repositioning as patient condition tolerates  4  Soft chair cushion to chair  5  Elevate heels  6  Hydraguard to buttocks/sacrum BID and prn  7   Moisturize skin q day after bathing

## 2019-01-20 NOTE — PROGRESS NOTES
Progress Note Wilene Dandy 1935, 82 y o  male MRN: [id number]    Unit/Bed#: -01 Encounter: [id number]    Primary Care Provider: Heide Rangel DO   Date and time admitted to hospital: 1/18/2019 11:51 AM        Ambulatory dysfunction   Assessment & Plan    · Lives alone, ambulates short distances with a walker and otherwise with a wheelchair  · Has a visiting nurse that comes to the house to help with lower extremity wound care  · Yue Lenny while ambulating to the door in the ED  · Short-term rehab recommend  · Will discuss with family     * Acute kidney injury ([facility] [address] )   [address] [place] [address]    · POA  Baseline creatinine approximately 1 0-1 2  · Suspected likely secondary to poor oral fluid intake while on Lasix daily  Likely cardiorenal    · Gentle hydration  · Check postvoid residuals  · Continue to hold Lasix and lisinopril at this time and monitor renal function  · Avoid hypotension and nephrotoxins  · Check renal ultrasound     Chronic systolic congestive heart failure (HCC)   Assessment & Plan    · Appears compensated  · Most recent echocardiogram on 6/21/18 showed LVEF 40%, mild diffuse hypokinesis, moderate pulmonary hypertension  · Lasix currently on hold in view of AK I, monitor daily weights, I's and O's, 2 g sodium diet     Chronic venous insufficiency   Assessment & Plan    · History chronic venous insufficiency of bilateral lower extremity with PVD  · Purplish discoloration noted to bilateral lower extremity which patient states is chronic and unchanged  · Edema, blisters and weepy lesions improved with lower extremity elevation  · Continue local wound care     Essential hypertension   Assessment & Plan    · Lasix and lisinopril on hold secondary to RICKI  Continue metoprolol  · BP stable       VTE Pharmacologic Prophylaxis:   Pharmacologic: Heparin  Mechanical VTE Prophylaxis in Place: Yes    Patient Centered Rounds: I have performed bedside rounds with nursing staff today      Discussions with Specialists or Other Care Team Provider:     Education and Discussions with Family / Patient:  Patient    Time Spent for Care: 20 minutes  More than 50% of total time spent on counseling and coordination of care as described above  Current Length of Stay: 2 day(s)    Current Patient Status: Inpatient   Certification Statement: The patient will continue to require additional inpatient hospital stay due to Acute kidney injury    Discharge Plan:  Next 24-48 hours    Code Status: Level 1 - Full Code      Subjective:   No events reported  Patient sitting in chair  Offers no complaints  Objective:     Vitals:   Temp (24hrs), Av °F (36 7 °C), Min:97 4 °F (36 3 °C), Max:99 1 °F (37 3 °C)    Temp:  [97 4 °F (36 3 °C)-99 1 °F (37 3 °C)] 97 4 °F (36 3 °C)  HR:  [] 78  Resp:  [18] 18  BP: ()/() 110/60  SpO2:  [95 %-96 %] 96 %  Body mass index is 27 29 kg/m²  Input and Output Summary (last 24 hours): Intake/Output Summary (Last 24 hours) at 19 1346  Last data filed at 19 1101   Gross per 24 hour   Intake              360 ml   Output              750 ml   Net             -390 ml       Physical Exam:     Physical Exam     Gen -Patient comfortable in chair  Neck- Supple  No thyromegaly or lymphadenopathy  No JVD  Lungs-decreased breath sounds, no wheeze or rales  Heart S1-S2, regular rate and rhythm, no murmurs  Abdomen-soft nontender, no organomegaly  Bowel sounds present  Extremities-no cyanosi,  clubbing ; trace edema  Skin- skin tears right elbow    Right shin dressing intact  Neuro-nonfocal         Additional Data:     Labs:      Results from last 7 days  Lab Units 19  1225   WBC Thousand/uL 5 35   HEMOGLOBIN g/dL 13 4   HEMATOCRIT % 43 5   PLATELETS Thousands/uL 94*   NEUTROS PCT % 79*   LYMPHS PCT % 12*   MONOS PCT % 9   EOS PCT % 0       Results from last 7 days  Lab Units 19  0519   SODIUM mmol/L 143   POTASSIUM mmol/L 5 3   CHLORIDE mmol/L 106   CO2 mmol/L 31   BUN mg/dL 77*   CREATININE mg/dL 1 94*   ANION GAP mmol/L 6   CALCIUM mg/dL 8 8   GLUCOSE RANDOM mg/dL 86                           * I Have Reviewed All Lab Data Listed Above  * Additional Pertinent Lab Tests Reviewed: All Labs Within Last 24 Hours Reviewed    Imaging:    Imaging Reports Reviewed Today Include:   Imaging Personally Reviewed by Myself Includes:      Recent Cultures (last 7 days):           Last 24 Hours Medication List:     Current Facility-Administered Medications:  acetaminophen 650 mg Oral Q6H PRN Henrietta Campos MD   aspirin 81 mg Oral Daily Henrietta Campos MD   bifidobacterium infantis 1 capsule Oral Daily Henrietta Campos MD   bisacodyl 10 mg Rectal Daily PRN Nicole Bean MD   docusate sodium 100 mg Oral BID Nicole Bean MD   finasteride 5 mg Oral Daily Henrietta Campos MD   heparin (porcine) 5,000 Units Subcutaneous Q8H [address] Henrietta Campos MD   metoprolol succinate 50 mg Oral BID Henrietta Campos MD   polyethylene glycol 17 g Oral Daily PRN Nicole Bean MD   senna 1 tablet Oral HS Nicole Bean MD        Today, Patient Was Seen By: Michi Brannon MD    ** Please Note: Dictation voice to text software may have been used in the creation of this document   **

## 2019-01-20 NOTE — ASSESSMENT & PLAN NOTE
· POA  Baseline creatinine approximately 1 0-1 2  · Suspected likely secondary to poor oral fluid intake while on Lasix daily    Likely cardiorenal    · Gentle hydration  · Check postvoid residuals  · Continue to hold Lasix and lisinopril at this time and monitor renal function  · Avoid hypotension and nephrotoxins  · Check renal ultrasound

## 2019-01-20 NOTE — ASSESSMENT & PLAN NOTE
· Appears compensated  · Most recent echocardiogram on 6/21/18 showed LVEF 40%, mild diffuse hypokinesis, moderate pulmonary hypertension  · Lasix currently on hold in view of AK I, monitor daily weights, I's and O's, 2 g sodium diet

## 2019-01-20 NOTE — CONSULTS
Progress Note - Wound   Oitlia Jade 82 y o  male MRN: [id number]  Unit/Bed#: -01 Encounter: [id number]      Assessment:   Wound nurse discussed wound care consult via phone with Isidro Martinez RN  Per RN, ruptured blister on right lower leg, anterior aspect  Healing  No current drainage  Buttocks with blanchable redness  Staff are repositioning frequently  Pt  Sitting in chair now  Staff to place Soft chair cushion in chair  Skin tear on right lower arm  Wound care/skin care Plan:   1  Right lower leg ruptured blister- Cleanse  apply Dermagran dressing, cover with dry gauzeand kerlix roll  Secure with tape  Change QOD  2  Right forearm skin tear- Cleanse  Apply Dermagran dressing, cover with dry gauze and kerlix roll  Secure with tape  Change QOD  3  Frequent repositioning as patient condition tolerates  4  Soft chair cushion to chair  5  Elevate heels  6  Hydraguard to buttocks/sacrum BID and prn  7  Moisturize skin q day after bathing  8  Wound care follow up weekly and prn      Vitals: Blood pressure 110/60, pulse 78, temperature (!) 97 4 °F (36 3 °C), temperature source Oral, resp  rate 18, height 5' 10" (1 778 m), weight 86 3 kg (190 lb 3 2 oz), SpO2 96 %  ,Body mass index is 27 29 kg/m²  Wound 01/18/19 Skin tear Arm Right; Lower (Active)   Wound Description Beefy red 1/19/2019  1:00 PM   [place]-wound Assessment Intact 1/19/2019  1:00 PM   Shape oval 1/19/2019  1:00 PM   Wound Length (cm) 15 cm 1/19/2019  1:00 PM   Wound Width (cm) 20 cm 1/19/2019  1:00 PM   Drainage Amount None 1/19/2019  1:00 PM   Treatments Cleansed 1/19/2019  1:00 PM   Dressing Dry dressing;Vaseline gauze 1/19/2019  1:00 PM   Wound packed? No 1/19/2019  1:00 PM   Dressing Changed Changed 1/19/2019  1:00 PM   Patient Tolerance Tolerated well 1/19/2019  1:00 PM   Dressing Status Clean;Dry; Intact 1/20/2019 12:00 AM

## 2019-01-20 NOTE — PHYSICAL THERAPY NOTE
PHYSICAL THERAPY NOTE    Patient Name: Ayaz Orellana  BPDNN'O Date: 1/20/2019     AGE:   82 y o  Mrn:   [id number]  ADMIT DX:  Foot pain [M79 673]  Acute kidney injury ([facility] [address] ) [N17 9]  Lower leg edema [R60 0]  Ambulatory dysfunction [R26 2]  Peripheral vascular disease of lower extremity ([facility] [address] ) [I73 9]    Past Medical History:   Diagnosis Date    Cardiomyopathy [facility])     with EF of 40 % 12/15    Cellulitis of lower extremity 6/21/2018    Cervical spinal stenosis     Cervical spondylosis     CH[facility]congestive heart failure) ([facility])     Chronic venous stasis dermatitis of both lower extremities     DDD (degenerative disc disease), lumbar     Degenerative cervical disc     Discitis of lumbar region     Elevated troponin 6/21/2018    History of BPH     Hypertension     Lumbar canal stenosis     OA (osteoarthritis)     Osteomyelitis ([facility])     Paroxysmal A-fib ([facility])     Pneumonia of both lower lobes due to infectious organism ([facility] [address] ) 6/21/2018    Spinal stenosis     Urinary retention      Length Of Stay: 2  PHYSICAL THERAPY EVALUATION :    01/20/19 0949   Note Type   Note type Eval only   Pain Assessment   Pain Assessment 0-10   Pain Score 8   Pain Type Acute pain;Chronic pain   Pain Location Leg   Pain Orientation Right   Effect of Pain on Daily Activities Right   Patient's Stated Pain Goal No pain   Hospital Pain Intervention(s) Repositioned; Ambulation/increased activity; Emotional support   Response to Interventions tolerated   Home Living   Type of Home House; Other (Comment)  (bi-level)   Home Layout Multi-level; Able to live on main level with bedroom/bathroom; Ramped entrance   Bathroom Shower/Tub Tub/shower unit   Bathroom Toilet Standard   Bathroom Equipment Other (Comment); Shower chair  (grab bar in bathroom behind door)   Liberal Accessibility Accessible via [employer]   [address]; Alisa Jaffe bed;Grab bars   Additional Comments Pt reports living alone in bi - level house w/ ramped entrance  Pt reports able to completed ADL on one level  Pt reports having aides in AM/PM to assist w/ ADL, and god son assisted w/ taking care of house and shopping   Prior Function   Level of [place] Needs assistance with IADLs   Lives With Alone   Receives Help From Family;Home health   ADL Assistance Needs assistance   IADLs Needs assistance   Falls in the last 6 months 1 to 4  (pt reports 1 fall in ED)   Vocational Retired   Comments Pt reports short distance ambulation using RW to bathroom, kitchen, but primarily uses w/c for functional mobility  Pt needs assistance w/ ADL/ IADL and reports having aides to assist in AM/PM  Pt has hospital bed   Restrictions/Precautions   Weight Bearing Precautions Per Order No   Other Precautions Bed Alarm; Chair Alarm;Pain; Fall Risk;Telemetry   General   Additional Pertinent History Pt  is an 83 yo M who presents from home after visiting nurse noted weeping blisters on RLE  Pt  lives alone is ambulatory with walker for short distances, and primary means of mobility is WC  Pt  had a fall upon arrival to ED  Family/Caregiver Present Yes   Cognition   Overall Cognitive Status WFL   Arousal/Participation Cooperative   Attention Within functional limits  (hard of hearing)   Orientation Level Oriented to person;Oriented to place;Oriented to situation; Other (Comment)  (able to report month, year, President but not date)   Memory Within functional limits   Following Commands Follows multistep commands with increased time or repetition   Comments Identified pt by full name and birthdate  Pt able to provide social history / home set- up  Pt alert, cooperative, and oriented  Able to participate in conversation abour work history     RLE Assessment   RLE Assessment (grossly 3-/5)   LLE Assessment   LLE Assessment (grossly 3-/5)   Coordination   Movements are Fluid and Coordinated 0 Coordination and Movement Description bradykinesis with all functional mobility   Sensation WFL   Light Touch   RLE Light Touch Grossly intact   LLE Light Touch Grossly intact   Bed Mobility   Supine to Sit 3  Moderate assistance   Additional items Assist x 2;HOB elevated;LE management;Verbal cues; Bedrails; Increased time required   Sit to Supine Unable to assess   Additional Comments Pt seated OOB in chair post eval w/ call bell in reach, chair alarm activated, and needs met  Transfers   Sit to Stand 3  Moderate assistance   Additional items Assist x 2; Increased time required;Verbal cues   Stand to Sit 3  Moderate assistance   Additional items Assist x 2; Increased time required;Verbal cues   Stand pivot 3  Moderate assistance   Additional items Assist x 2; Increased time required;Verbal cues  (using RW)   Additional Comments Pt benefits from cues for hand placement, tech  Balance   Static Sitting Fair   Static Standing Zero  (assistx2)   Endurance Deficit   Endurance Deficit Yes   Endurance Deficit Description limited standing tolerance, degradation of posture with faitgue   Activity Tolerance   Activity Tolerance Patient limited by pain   Medical Staff Made Aware Spoke to OT Allison,   Nurse Made Aware Spoke to RN   Assessment   Prognosis Good   Problem List Decreased strength;Decreased endurance;Decreased range of motion; Impaired balance;Decreased mobility; Decreased coordination;Decreased safety awareness; Impaired hearing;Decreased skin integrity;Pain   Assessment Pt  is an 83 yo M who presents from home after visiting nurse noted weeping blisters on RLE  Pt  lives alone is ambulatory with walker for short distances, and primary means of mobility is WC  Pt  had a fall upon arrival to ED   Dx: Acute Kidney Injury, order placed for PT eval and tx, w/ activity order of up w/ A  pt presents w/ comorbidities of A-Fib, Htn, CHF, Cardiomyopathy, MI type 2, Ambulatory Dysfunction, B/L pleural effusion and personal factors of advanced age, inaccessible home environment, mobilizing w/ assistive device, inability to ambulate household distances, inability to navigate community distances, inability to navigate level surfaces w/o external assistance, unable to perform dynamic tasks in community, positive fall history, hearing impairments, unable to perform physical activity, limited insight into impairments, inability to perform IADLs, inability to perform ADLs and inability to live alone  pt presents w/ pain, weakness, decreased endurance, impaired balance, gait deviations, impaired hearing, impaired coordination, decreased safety awareness, fall risk and LE edema  these impairments are evident in findings from physical examination (weakness, impaired coordination, impaired skin integrity and edema of extremities), mobility assessment (need for ModX2 assist w/ all phases of mobility when usually mobilizing independently, tolerance to only 0 feet of ambulation and need for cueing for mobility technique), and Barthel Index: 45/100  pt needed input for task focus and mobility technique  pt is at risk for falls due to physical and safety awareness deficits  pt's clinical presentation is unstable/unpredictable (evident in need for assist w/ all phases of mobility when usually mobilizing independently, tolerance to only 0 feet of ambulation, pain impacting overall mobility status, need for input for mobility technique, need for input for task focus and mobility technique and need for input for mobility technique/safety)  pt needs inpatient PT tx to improve mobility deficits  discharge recommendation is for inpatient rehab to reduce fall risk and maximize level of functional independence  Goals   Patient Goals to walk more   STG Expiration Date 01/30/19   Short Term Goal #1 pt will:  Increase bilateral LE strength 1/2 grade to facilitate independent mobility, Perform all bed mobility tasks w/ modx1 to decrease fall risk factors, Perform all transfers w/ modx1 to improve independence, Increase all balance 1/2 grade to decrease risk for falls, Tolerate 3 hr OOB to faciliate upright tolerance and Improve Barthel Index score to 70 or greater to facilitate independence PT to see when gait training is appropriate  Treatment Day 0   Plan   Treatment/Interventions Functional transfer training;LE strengthening/ROM; Therapeutic exercise; Endurance training;Cognitive reorientation;Patient/family training;Equipment eval/education; Bed mobility;Gait training;Spoke to nursing;Spoke to case management;OT   PT Frequency (3-5x/week)   Recommendation   Recommendation Post acute IP rehab   Equipment Recommended Madison Alston; Wheelchair   PT - OK to Discharge Yes   Additional Comments to rehab when medically appropriate   Barthel Index   Feeding 10   Bathing 0   Grooming Score 0   Dressing Score 5   Bladder Score 0   Bowels Score 10   Toilet Use Score 5   Transfers (Bed/Chair) Score 5   Mobility (Level Surface) Score 0   Stairs Score 0   Barthel Index Score 35     Skilled PT recommended while in hospital and upon DC to progress pt toward treatment goals       Christophe Nunez, PT 1/20/2019

## 2019-01-20 NOTE — PLAN OF CARE
Problem: OCCUPATIONAL THERAPY ADULT  Goal: Performs self-care activities at highest level of function for planned discharge setting  See evaluation for individualized goals  Treatment Interventions: ADL retraining, Functional transfer training, UE strengthening/ROM, Patient/family training, Equipment evaluation/education, Activityengagement, Continued evaluation  Equipment Recommended: Other (comment) (pt reports having grab bar, hospital bed, shower chair)       See flowsheet documentation for full assessment, interventions and recommendations  Limitation: Decreased ADL status, Decreased UE strength, Decreased UE ROM, Decreased endurance, Decreased self-care trans, Decreased fine motor control     Assessment: Pt is an 83yo male [facility] 1/18/2019  Pt presents w/ acute kidney injury and signficant PMH impacting his occupational performance including  Hip, back, foot sx, osteomylelitis, R rotator cuff injury, a-fib, hx MI, systolic CHF, cervical spinal stenosis, HTN  Pt reports living alone in bi -level house w/ ramp to enter PTA  Pt reports using RW for short distances (to /from bathroom, kitchen) but primarily uses w/c for functional mobility  Pt needs assistance w/ ADL / IADL from aides in AM/PM, and supportive god son assist w/ IADL (shopping, taking care of house)  Upon eval, pt required mod A x2 to complete bed mobility supine to sit, and mod A x2 sit <> stand from EOB  Pt required mod A x 2 to complete functional transfer using RW bed to chair  Pt complete LBD w/ max A  Pt able to participate in conversation about work history, and home set- up  Pt alert, cooperative, pleasant, and oriented to person, place, situation   Pt presents w/ decresed activity tolerance, decreased endurance, decreased UE / LE strength, increased LE edema, increased pain, decreased UE ROM, decreased sitting tolerance, decreased standing tolerance impacting his I w/ dressing, bathing, functional mobility, functional transfers, activity engagement, food prep / clean up, clothing mgmt, and oral hygiene  Pt would benefit from OT while in acute care to address deficits, and post acute rehab when medically stable for discharge from acute care to return to baseline level of I   Will continue to follow     OT Discharge Recommendation: Other (Comment) (to post acute rehab)  OT - OK to Discharge:  (to post acute rehab)

## 2019-01-20 NOTE — PLAN OF CARE
Problem: PHYSICAL THERAPY ADULT  Goal: Performs mobility at highest level of function for planned discharge setting  See evaluation for individualized goals  Treatment/Interventions: Functional transfer training, LE strengthening/ROM, Therapeutic exercise, Endurance training, Cognitive reorientation, Patient/family training, Equipment eval/education, Bed mobility, Gait training, Spoke to nursing, Spoke to case management, OT  Equipment Recommended: Jonathan Pineda, Wheelchair       See flowsheet documentation for full assessment, interventions and recommendations  Prognosis: Good  Problem List: Decreased strength, Decreased endurance, Decreased range of motion, Impaired balance, Decreased mobility, Decreased coordination, Decreased safety awareness, Impaired hearing, Decreased skin integrity, Pain  Assessment: Pt  is an 81 yo M who presents from home after visiting nurse noted weeping blisters on RLE  Pt  lives alone is ambulatory with walker for short distances, and primary means of mobility is WC  Pt  had a fall upon arrival to ED  Dx: Acute Kidney Injury, order placed for PT eval and tx, w/ activity order of up w/ A  pt presents w/ comorbidities of A-Fib, Htn, CHF, Cardiomyopathy, MI type 2, Ambulatory Dysfunction, B/L pleural effusion and personal factors of advanced age, inaccessible home environment, mobilizing w/ assistive device, inability to ambulate household distances, inability to navigate community distances, inability to navigate level surfaces w/o external assistance, unable to perform dynamic tasks in community, positive fall history, hearing impairments, unable to perform physical activity, limited insight into impairments, inability to perform IADLs, inability to perform ADLs and inability to live alone  pt presents w/ pain, weakness, decreased endurance, impaired balance, gait deviations, impaired hearing, impaired coordination, decreased safety awareness, fall risk and LE edema   these impairments are evident in findings from physical examination (weakness, impaired coordination, impaired skin integrity and edema of extremities), mobility assessment (need for ModX2 assist w/ all phases of mobility when usually mobilizing independently, tolerance to only 0 feet of ambulation and need for cueing for mobility technique), and Barthel Index: 45/100  pt needed input for task focus and mobility technique  pt is at risk for falls due to physical and safety awareness deficits  pt's clinical presentation is unstable/unpredictable (evident in need for assist w/ all phases of mobility when usually mobilizing independently, tolerance to only 0 feet of ambulation, pain impacting overall mobility status, need for input for mobility technique, need for input for task focus and mobility technique and need for input for mobility technique/safety)  pt needs inpatient PT tx to improve mobility deficits  discharge recommendation is for inpatient rehab to reduce fall risk and maximize level of functional independence  Recommendation: Post acute IP rehab     PT - OK to Discharge: Yes    See flowsheet documentation for full assessment

## 2019-01-20 NOTE — ASSESSMENT & PLAN NOTE
· Lives alone, ambulates short distances with a walker and otherwise with a wheelchair  · Has a visiting nurse that comes to the house to help with lower extremity wound care  · Jerilee Punches while ambulating to the door in the ED  · Short-term rehab recommend  · Will discuss with family

## 2019-01-21 PROBLEM — N28.89 NODULE OF KIDNEY: Status: ACTIVE | Noted: 2019-01-01

## 2019-01-21 NOTE — ASSESSMENT & PLAN NOTE
· POA  Baseline creatinine approximately 1 0-1 2  · Suspected likely secondary to poor oral fluid intake while on Lasix daily    Likely cardiorenal    · Check postvoid residuals  · Resume Lasix 20 mg daily  · Continue to hold lisinopril today  · Avoid hypotension and nephrotoxins  · Follow renal ultrasound

## 2019-01-21 NOTE — PROGRESS NOTES
Progress Note Venkat Massey 1935, 82 y o  male MRN: [id number]    Unit/Bed#: -01 Encounter: [id number]    Primary Care Provider: Sherri Villalba DO   Date and time admitted to hospital: 1/18/2019 11:51 AM  Ambulatory dysfunction   Assessment & Plan    · Lives alone, ambulates short distances with a walker and otherwise with a wheelchair  · Has a visiting nurse that comes to the house to help with lower extremity wound care  · Khris Kenney while ambulating to the door in the ED  · Short-term rehab recommend  · Case management consulted for placement     * Acute kidney injury ([facility] [address] )   Assessment & Plan    · POA  Baseline creatinine approximately 1 0-1 2  · Suspected likely secondary to poor oral fluid intake while on Lasix daily  Likely cardiorenal    · Check postvoid residuals  · Resume Lasix 20 mg daily  · Continue to hold lisinopril today  · Avoid hypotension and nephrotoxins  · Follow renal ultrasound     Chronic systolic congestive heart failure (HCC)   Assessment & Plan    · Appears compensated  · Most recent echocardiogram on 6/21/18 showed LVEF 40%, mild diffuse hypokinesis, moderate pulmonary hypertension  · Lasix currently on hold in view of AK I, monitor daily weights, I's and O's, 2 g sodium diet     Chronic venous insufficiency   Assessment & Plan    · History chronic venous insufficiency of bilateral lower extremity with PVD  · Purplish discoloration noted to bilateral lower extremity which patient states is chronic and unchanged  · Edema, blisters and weepy lesions improved with lower extremity elevation  · Continue local wound care     Essential hypertension   Assessment & Plan    · Lasix and lisinopril on hold secondary to RICKI  Continue metoprolol  · BP stable       VTE Pharmacologic Prophylaxis:   Pharmacologic: Heparin  Mechanical VTE Prophylaxis in Place: Yes    Patient Centered Rounds: I have performed bedside rounds with nursing staff today      Discussions with Specialists or Other Care Team Provider:  Case management    Education and Discussions with Family / Patient:  Patient    Time Spent for Care: 30 minutes  More than 50% of total time spent on counseling and coordination of care as described above  Current Length of Stay: 3 day(s)    Current Patient Status: Inpatient   Certification Statement: The patient will continue to require additional inpatient hospital stay due to Acute kidney    Discharge Plan:  Next 24 hours    Code Status: Level 1 - Full Code      Subjective:   No events reported  Some leg swelling noted    Objective:     Vitals:   Temp (24hrs), Av 8 °F (36 6 °C), Min:97 5 °F (36 4 °C), Max:98 °F (36 7 °C)    Temp:  [97 5 °F (36 4 °C)-98 °F (36 7 °C)] 97 5 °F (36 4 °C)  HR:  [66-85] 66  Resp:  [18] 18  BP: ()/(55-67) 90/58  SpO2:  [95 %-97 %] 95 %  Body mass index is 27 29 kg/m²  Input and Output Summary (last 24 hours): Intake/Output Summary (Last 24 hours) at 19 1111  Last data filed at 19 0620   Gross per 24 hour   Intake              840 ml   Output              856 ml   Net              -16 ml       Physical Exam:     Physical Exam     Gen -Patient comfortable in chair  Neck- Supple  No thyromegaly or lymphadenopathy  Lungs-Clear bilaterally without any wheeze or rales   Heart S1-S2, regular rate and rhythm, no murmurs  Abdomen-soft nontender, no organomegaly   Bowel sounds present  Extremities-no cyanosi,  Clubbing; edema of legs noted  Skin- right shin wound  Neuro-nonfocal      Additional Data:     Labs:      Results from last 7 days  Lab Units 19  0514 19  1225   WBC Thousand/uL 4 66 5 35   HEMOGLOBIN g/dL 12 0 13 4   HEMATOCRIT % 38 9 43 5   PLATELETS Thousands/uL 90* 94*   NEUTROS PCT %  --  79*   LYMPHS PCT %  --  12*   MONOS PCT %  --  9   EOS PCT %  --  0       Results from last 7 days  Lab Units 19  0514   SODIUM mmol/L 141   POTASSIUM mmol/L 5 1   CHLORIDE mmol/L 106   CO2 mmol/L 28   BUN mg/dL 74* CREATININE mg/dL 1 79*   ANION GAP mmol/L 7   CALCIUM mg/dL 8 8   GLUCOSE RANDOM mg/dL 85                           * I Have Reviewed All Lab Data Listed Above  * Additional Pertinent Lab Tests Reviewed: All Labs Within Last 24 Hours Reviewed    Imaging:    Imaging Reports Reviewed Today Include:   Imaging Personally Reviewed by Myself Includes:      Recent Cultures (last 7 days):           Last 24 Hours Medication List:     Current Facility-Administered Medications:  acetaminophen 650 mg Oral Q6H PRN Ashly Waters MD   aspirin 81 mg Oral Daily Ashly Waters MD   bifidobacterium infantis 1 capsule Oral Daily Ashly Waters MD   bisacodyl 10 mg Rectal Daily PRN Nicole Bean MD   docusate sodium 100 mg Oral BID Nicole Bean MD   finasteride 5 mg Oral Daily Ashly Waters MD   furosemide 20 mg Oral Daily Nicole Bean MD   heparin (porcine) 5,000 Units Subcutaneous Q8H [facility] Ashly Waters MD   metoprolol succinate 50 mg Oral BID Ashly Waters MD   polyethylene glycol 17 g Oral Daily PRN Nicole Bean MD   senna 1 tablet Oral HS Nicole Bean MD        Today, Patient Was Seen By: Orestes Wood MD    ** Please Note: Dictation voice to text software may have been used in the creation of this document   **

## 2019-01-21 NOTE — WOUND OSTOMY CARE
Progress Note - Wound   River Pineslaila Avendaño 82 y o  male MRN: [id number]  Unit/Bed#: -Shweta Encounter: [id number]      Assessment:   Follow-up on wound care consult  Found pt  Sitting up in chair  No batteries in hearing aid  Extremely [place]  Bilateral lower legs with redness  Moderate edema  Scab on left lower leg, anterior aspect  Dry, scaly, peeling skin on both legs  Bilateral callouses great toes, medial aspect  Several Fissures present on feet, toes due to dryness of skin  Periwounds of fissues without redness  Right lower leg, anterior aspect, partial thickness skin loss area due to presumed ruptured blister  Area measures 1 7x0 7x0 1 cm  Tissue red  No drainage  Currently Dermagran dressing  Serous filled blister on right foot, dorsum, immediately below toes  Measures 3 7x2cm  Skin tear on right lower arm  Dressing intact  Did not assess him sacrum/buttocks  Pt  Currently in chair  Staff RN Megha Velasquez said area was with nonblanchable redness but nothing was open  Staff applying Calazime because of sometimes urinary incontinence  Tentative discharge tomorrow to [facility]  Plan:   1  Continue skin/wound care orders  Vitals: Blood pressure 90/58, pulse 66, temperature 97 5 °F (36 4 °C), temperature source Oral, resp  rate 18, height 5' 10" (1 778 m), weight 86 3 kg (190 lb 3 2 oz), SpO2 95 %  ,Body mass index is 27 29 kg/m²  Pressure Ulcer 01/20/19 Sacrum (Active)   Staging Stage I 1/20/2019  8:06 PM   Wound Description Clean;Dry;Non-blanchable erythema 1/20/2019  8:06 PM   Treatment Offload; Turn & reposition 1/20/2019  8:06 PM       Wound 01/18/19 Skin tear Arm Right; Lower (Active)   Wound Description Beefy red 1/20/2019  1:00 PM   [place]-wound Assessment Intact 1/20/2019  1:00 PM   Shape oval 1/20/2019  1:00 PM   Wound Length (cm) 1 5 cm 1/20/2019  1:00 PM   Wound Width (cm) 2 cm 1/20/2019  1:00 PM   Drainage Amount None 1/20/2019  1:00 PM   Treatments Cleansed 1/20/2019  1:00 PM Dressing Protective barrier;Gauze;Dry dressing 1/20/2019  1:00 PM   Wound packed? No 1/19/2019  1:00 PM   Dressing Changed Changed 1/20/2019  1:00 PM   Patient Tolerance Tolerated well 1/20/2019  1:00 PM   Dressing Status Clean;Dry; Intact 1/20/2019  8:06 PM       Wound 01/20/19 Abrasion(s) Leg Right; Lower; Anterior opened blister on leg (Active)   Wound Description Clean;Dry; Intact 1/20/2019  1:00 PM   [place]-wound Assessment Clean;Dry; Intact 1/20/2019  1:00 PM   Shape round  1/20/2019  1:00 PM   Wound Length (cm) 1 5 cm 1/20/2019  1:00 PM   Wound Width (cm) 0 75 cm 1/20/2019  1:00 PM   Closure None 1/20/2019  1:00 PM   Drainage Amount Small 1/20/2019  1:00 PM   Drainage Description Bloody;Purulent 1/20/2019  1:00 PM   Treatments Cleansed 1/20/2019  1:00 PM   Dressing Protective barrier;Dry dressing;Gauze 1/20/2019  1:00 PM   Wound packed?  No 1/20/2019  1:00 PM   Dressing Changed Changed 1/20/2019  1:00 PM   Patient Tolerance Tolerated well 1/20/2019  1:00 PM   Dressing Status Open to air 1/20/2019  8:06 PM

## 2019-01-21 NOTE — UTILIZATION REVIEW
Notification of Inpatient Admission/Inpatient Authorization Request  This is a Notification of Inpatient Admission/Request for Inpatient Authorization for our facility [address] [facility] [facility]  Be advised that this patient was admitted to our facility under Inpatient Status  Please contact the Utilization Review Department where the patient is receiving care services for additional admission information  Place of Service Code: 24   Place of Service Name: Inpatient Hospital  Presentation Date & Time: 1/18/2019 11:51 AM  Inpatient Admission Date & Time: 1/18/19 1403  Discharge Date & Time: No discharge date for patient encounter  Discharge Disposition (if discharged): Home with Home Health Care(Disregard, Still in House)  Attending Physician:  NORMAN Willis  [facility] P[facility]er ID- [id number]  [address] [place] [address]  Phone 1: [phone]  Fax: [phone]  Admission Orders     Ordered        01/18/19 1403  Inpatient Admission (expected length of stay for this patient is greater than two midnights)  Once               Facility: [facility]  Address: Tim [address] [place] [address]    Phone: [phone]  Tax ID [id number]  [facility] [id number]  Medicare: [id number]  [address]  Utilization Review Department  Phone: [phone]; Fax [phone]  ATTENTION: Please call with any questions or concerns to [phone]  and carefully listen to the prompts so that you are directed to the right person  Send all requests for admission clinical reviews, approved or denied determinations and any other requests to fax [phone]   All voicemails are confidential

## 2019-01-21 NOTE — SOCIAL WORK
LOS 3, not a bundle, not a readmit  CM spoke to  Chava Kern at [place] [phone] to provide update and make her aware recommendation is STR  Per Chava Kern, she spoke with Pt's POA Flor Lamar and Flor Lamar would like Pt to go to [facility]  CM met with Pt at bedside  Pt lives in a bi-level with a ramp to enter  Pt has [employer] and receives assistance from the aides for [employer]  Pt uses a walker and wheelchair  CM discussed recommendation of STR with Pt and Pt states he would like discharge planning arranged with his [address]  CM spoke to Flor Lamar who stated he does want Pt to go to THE [facility]  CM sent referral and they are able to accept  CM called [facility] to arrange S transport, transport arranged for between 12-1pm on 1/21/19  CM made Dr Eric Mcmahon aware and nurse Palomo Horne aware  CM left a message for Chava Kern at [employer] to call [facility] to provide auth  CM spoke to Laron Ortiz at [place] who reports that -B is rearranging bed assignments to make a bed for Pt and to follow up to confirm in the morning  CM dept will follow to confirm that Pt has a bed and make family aware

## 2019-01-21 NOTE — PLAN OF CARE
Problem: DISCHARGE PLANNING - CARE MANAGEMENT  Goal: Discharge to post-acute care or home with appropriate resources  INTERVENTIONS:  - Conduct assessment to determine patient/family and health care team treatment goals, and need for post-acute services based on payer coverage, community resources, and patient preferences, and barriers to discharge  - Address psychosocial, clinical, and financial barriers to discharge as identified in assessment in conjunction with the patient/family and health care team  - Arrange appropriate level of post-acute services according to patients   needs and preference and payer coverage in collaboration with the physician and health care team  - Communicate with and update the patient/family, physician, and health care team regarding progress on the discharge plan  - Arrange appropriate transportation to post-acute venues  Outcome: Progressing  LOS 3, not a bundle, not a readmit  CM spoke to  Domenica Diego at [employer] [phone] to provide update and make her aware recommendation is STR  Per Domenica Diego, she spoke with Pt's POA Mika Mason and Mika Mason would like Pt to go to [facility]  WERNER met with Pt at bedside  Pt lives in a bi-level with a ramp to enter  Pt has [employer] and receives assistance from the aides for [employer]  Pt uses a walker and wheelchair  CM discussed recommendation of STR with Pt and Pt states he would like discharge planning arranged with his [address]  CM spoke to Mika Mason who stated he does want Pt to go to THE [facility]  CM sent referral and they are able to accept  CM called [facility] to arrange S transport, transport arranged for between 12-1pm on 1/21/19  CM made Dr Abram Ramos aware and nurse Bajwa aware  CM left a message for Domenica Diego at [employer] to call [facility] to provide auth  CM spoke to Beatriz Parkinson at [place] who reports that -B is rearranging bed assignments to make a bed for Pt and to follow up to confirm in the morning   CM dept will follow to confirm that Pt has a bed and make family aware

## 2019-01-21 NOTE — ASSESSMENT & PLAN NOTE
· Lives alone, ambulates short distances with a walker and otherwise with a wheelchair  · Has a visiting nurse that comes to the house to help with lower extremity wound care  · Nikita Orellana while ambulating to the door in the ED  · Short-term rehab recommend  · Case management consulted for placement

## 2019-01-22 NOTE — DISCHARGE SUMMARY
Discharge Summary - [address] [facility]    Patient Information: Dennise Ford 82 y o  male MRN: [id number]  Unit/Bed#: -01 Encounter: [id number]    Discharging Physician / Practitioner: Demetrice Kim MD  PCP: Harry Ramos DO  Admission Date: 1/18/2019  Discharge Date: 01/22/19    Disposition:     Home    Reason for Admission:  Generalized weakness and ambulatory dysfunction    Discharge Diagnoses:     Principal Problem:    Acute kidney injury [facility])  Active Problems:    Ambulatory dysfunction    Chronic systolic congestive heart failure ([facility] [address] )    Chronic venous insufficiency    Essential hypertension    Nodule of kidney  Resolved Problems:    * No resolved hospital problems  *      Consultations During Hospital Stay:  · None    Procedures Performed:     · Ultrasound kidneys:  No hydronephrosis  Medical renal disease  1 8 cm Kidney nodule noted on left kidney which will require 3 months follow-up ultrasound    Significant Findings / Test Results:     · As above    Incidental Findings:   · Left kidney not    Test Results Pending at Discharge (will require follow up): · None     Outpatient Tests Requested:  · None    Complications:  None    Hospital Course:     Dennise Ford is a 82 y o  male with chronic diastolic CHF and hypertension patient who originally presented to the hospital on 1/18/2019 due to generalized weakness and ambulatory dysfunction  Patient was subsequent evaluated in the emergency department  He was also noted to have weeping lower extremities with blisters,  He was noted to be dehydrated with elevated creatinine  He was placed on IV hydration with improvement in his creatinine  He was on Lasix 40 mg daily and the dose of which was reduced to 20 mg daily  He was seen by wound care for lower extremity wounds  He was assessed by physical therapy and occupational therapy and recommended short-term rehabilitation  Patient is being discharged in stable condition  Family was updated at the time of discharge    Condition at Discharge: good     Discharge Day Visit / Exam:     Subjective:  No complaints  Vitals: Blood Pressure: 92/60 (01/22/19 0713)  Pulse: 96 (01/22/19 0713)  Temperature: 97 7 °F (36 5 °C) (01/22/19 0713)  Temp Source: Oral (01/22/19 0713)  Respirations: 18 (01/22/19 0713)  Height: 5' 10" (177 8 cm) (01/18/19 1511)  Weight - Scale: 86 3 kg (190 lb 3 2 oz) (01/18/19 1511)  SpO2: 96 % (01/22/19 0713)  Exam:   Physical Exam     Gen -Patient comfortable at rest  Neck- Supple  No thyromegaly or lymphadenopathy  Lungs-Clear bilaterally without any wheeze or rales   Heart S1-S2, regular rate and rhythm, systolic murmur  Abdomen-soft nontender, no organomegaly  Bowel sounds present  Extremities-no cyanosi,  clubbing ; trace edema legs  Skin- open wound right shin  Neuro-nonfocal       Discussion with Family:  Updated Yu Vazquez    Discharge instructions/Information to patient and family:   See after visit summary for information provided to patient and family  Provisions for Follow-Up Care:  See after visit summary for information related to follow-up care and any pertinent home health orders  Planned Readmission:  No     Discharge Statement:  I spent 35 minutes discharging the patient  This time was spent on the day of discharge  I had direct contact with the patient on the day of discharge  Greater than 50% of the total time was spent examining patient, answering all patient questions, arranging and discussing plan of care with patient as well as directly providing post-discharge instructions  Additional time then spent on discharge activities  Discharge Medications:  See after visit summary for reconciled discharge medications provided to patient and family        ** Please Note: This note has been constructed using a voice recognition system **

## 2019-01-22 NOTE — UTILIZATION REVIEW
Notification of Discharge  This is a Notification of Discharge from our facility [address]  Please be advised that this patient has been discharge from our facility  Below you will find the admission and discharge date and time including the patients disposition  PRESENTATION DATE: 1/18/2019 11:51 AM  IP ADMISSION DATE: 1/18/19 1403  DISCHARGE DATE: 1/22/2019  1:23 PM  DISPOSITION: Non [facility] SNF/TCU/SNU    [address] Utilization Review Department  Phone: [phone]; Fax [phone]  ATTENTION: Please call with any questions or concerns to [phone]  and carefully listen to the prompts so that you are directed to the right person  Send all requests for admission clinical reviews, approved or denied determinations and any other requests to fax [phone]   All voicemails are confidential

## 2019-02-09 PROBLEM — R13.10 DYSPHAGIA: Status: ACTIVE | Noted: 2019-01-01

## 2019-02-09 PROBLEM — N18.30 STAGE 3 CHRONIC KIDNEY DISEASE (HCC): Status: ACTIVE | Noted: 2019-01-01

## 2019-02-09 PROBLEM — J96.11 CHRONIC RESPIRATORY FAILURE WITH HYPOXIA (HCC): Status: ACTIVE | Noted: 2019-01-01

## 2019-02-09 PROBLEM — J90 PLEURAL EFFUSION: Status: ACTIVE | Noted: 2019-01-01

## 2019-02-09 PROBLEM — I21.4 NSTEMI (NON-ST ELEVATED MYOCARDIAL INFARCTION) (HCC): Status: ACTIVE | Noted: 2019-01-01

## 2019-02-09 NOTE — ASSESSMENT & PLAN NOTE
· Obtain swallow eval  · Consider barium swallow or GI eval if felt to be more of an esophageal issue

## 2019-02-09 NOTE — H&P
H&P- Mara Credit 1935, 82 y o  male MRN: [id number]    Unit/Bed#: ED 06 Encounter: [id number]    Primary Care Provider: Victoriano Howard DO   Date and time admitted to hospital: 2/9/2019  3:02 PM    * Acute on chronic systolic heart failure ([facility] [address] )   Assessment & Plan    · Last echo showed EF 40%  Obtain repeat echo  · Note also low albumin  · Home regimen: Lasix 40 mg daily and metolazone 2 5 mg daily  · IV Lasix  · I&Os, daily weights  · Consult heart failure team  · Not sure why he isn't on BB or ACEI at baseline  Start low dose metoprolol     NSTEMI (non-ST elevated myocardial infarction) ([facility] )   Assessment & Plan    · Possibly related to volume overload  R/O ACS  · Trend troponins  · Placed on heparin drip in ED  Will continue     Paroxysmal A-fib (HCC)   Assessment & Plan    · With RVR on admission  · Patient not on anticoagulation or AV gui blockers at baseline  · Start low dose beta blocker  · Already on heparin drip for NSTEMI  · Check Mg and TSH     Chronic respiratory failure with hypoxia (HCC)   Assessment & Plan    · On 2L O2 at baseline     Stage 3 chronic kidney disease (HCC)   Assessment & Plan    · Creatinine at baseline  · Monitor with diuresis     Dysphagia   Assessment & Plan    · Obtain swallow eval  · Consider barium swallow or GI eval if felt to be more of an esophageal issue     Pleural effusion   Assessment & Plan    · F/U final x-ray report  · Suspect due to volume overload but would obtain serial imaging- may need thoracentesis if not improved with diuresis       VTE Prophylaxis: Heparin Drip  / sequential compression device   Code Status: Full code  POLST: There is no POLST form on file for this patient (pre-hospital)   Discussion with family: Updated patient's nephew, Tori Hess, by phone per patient request     Anticipated Length of Stay:  Patient will be admitted on an Inpatient basis with an anticipated length of stay of  > 2 midnights     Justification for Hospital Stay: heart failure, NSTEMI    Total Time for Visit, including Counseling / Coordination of Care: 1 hour  Greater than 50% of this total time spent on direct patient counseling and coordination of care  Chief Complaint:   SOB    History of Present Illness:    Marycruz Lucas is a 82 y o  male with a history of systolic heart failure, PAF, chronic respiratory failure on 2L O2, and CKD stage 3 who presents with abnormal EKG, SOB, and chest pain  Patient is a poor historian  He was recently admitted at the end of January with RICKI and was given IVF hydration  His Lasix was cut in half to 20 mg daily and he was discharged to rehab  He reports progressive exertional dyspnea over past few weeks  He also reports intermittent right sided chest pain that he describes as pressure sensation  However, he reports he gets the pain when staff adjusts him in bed  He does not get chest pain with exertion  He has chronic lower extremity edema and wounds and is unsure if his swelling is changed  He also reports trouble with swallowing, both water and solids  States this has been going on "a long time" and is unsure if he was ever evaluated for this  Does not follow special diet  Patient found to be in rapid a fib at SNF and was sent to ED today for evaluation  Per record review from SNF, patient was getting Lasix 40 mg daily plus metolazone 2 5 mg daily  When he was hospitalized in January, he was on metoprolol but it does not appear he is on any beta blockers at [facility]  Review of Systems:    Review of Systems   Constitutional: Negative  HENT: Positive for trouble swallowing  Eyes: Negative  Respiratory: Positive for shortness of breath  Cardiovascular: Positive for chest pain and leg swelling  Gastrointestinal: Negative  Endocrine: Negative  Genitourinary: Negative  Musculoskeletal: Negative  Skin: Negative  Allergic/Immunologic: Negative  Neurological: Negative  Hematological: Negative  Psychiatric/Behavioral: Negative  Past Medical and Surgical History:     Past Medical History:   Diagnosis Date    Cardiomyopathy [facility])     with EF of 40 % 12/15    Cellulitis of lower extremity 6/21/2018    Cervical spinal stenosis     Cervical spondylosis     CHF (congestive heart failure) (HCC)     Chronic venous stasis dermatitis of both lower extremities     DDD (degenerative disc disease), lumbar     Degenerative cervical disc     Discitis of lumbar region     Elevated troponin 6/21/2018    History of BPH     Hypertension     Lumbar canal stenosis     OA (osteoarthritis)     Osteomyelitis (HCC)     Paroxysmal A-fib (HCC)     Pneumonia of both lower lobes due to infectious organism ([facility] [address] ) 6/21/2018    Spinal stenosis     Urinary retention        Past Surgical History:   Procedure Laterality Date    BACK SURGERY      FOOT SURGERY      HIP SURGERY      AK COLONOSCOPY FLX DX W/COLLJ SPEC WHEN PFRMD N/A 11/3/2016    Procedure: COLONOSCOPY;  Surgeon: Lam Watt MD;  Location: [facility]; Service: Gastroenterology    ROTATOR CUFF REPAIR         Meds/Allergies:    Prior to Admission medications    Medication Sig Start Date End Date Taking?  Authorizing Provider   albuterol (2 5 mg/3 mL) 0 083 % nebulizer solution Take 2 5 mg by nebulization every 6 (six) hours as needed for wheezing or shortness of breath   Yes Historical Provider, MD   aluminum-magnesium hydroxide-simethicone (MYLANTA) 200-200-20 mg/5 mL suspension Take 30 mL by mouth every 4 (four) hours as needed for indigestion or heartburn   Yes Historical Provider, MD   aspirin 81 mg chewable tablet Chew 1 tablet (81 mg total) daily 6/26/18  Yes Piotr Bryan MD   bifidobacterium infantis (ALIGN) capsule Take 1 capsule by mouth daily 1/3/17  Yes Historical Provider, MD   bisacodyl (DULCOLAX) 10 mg suppository Insert 1 suppository (10 mg total) into the rectum daily as needed for constipation 1/22/19  Yes Nancy Noe MD docusate sodium (COLACE) 100 mg capsule Take 1 capsule (100 mg total) by mouth 2 (two) times a day 1/22/19  Yes Nicole Bean MD   finasteride (PROSCAR) 5 mg tablet Take 5 mg by mouth daily   Yes Historical Provider, MD   furosemide (LASIX) 20 mg tablet Take 1 tablet (20 mg total) by mouth daily  Patient taking differently: Take 40 mg by mouth daily   1/22/19  Yes Nicole Bean MD   metolazone (ZAROXOLYN) 2 5 mg tablet Take 2 5 mg by mouth daily   Yes Historical Provider, MD   senna (SENOKOT) 8 6 mg Take 1 tablet (8 6 mg total) by mouth daily at bedtime 1/22/19  Yes Nicole Bean MD   silver sulfadiazine (SILVADENE,SSD) 1 % cream Apply topically daily   Yes Historical Provider, MD   metoprolol succinate (TOPROL-XL) 50 mg 24 hr tablet Take 1 tablet (50 mg total) by mouth 2 (two) times a day 6/25/18   Ramya Mireles MD     I have reveiwed home medications using records provided by [facility]  Allergies: Allergies   Allergen Reactions    Morphine        Social History:     Marital Status: Single   Occupation:    Patient Pre-hospital Living Situation: Currently at rehab  Patient Pre-hospital Level of Mobility: uses walker, wheelchair  Patient Pre-hospital Diet Restrictions: None  Substance Use History:   History   Alcohol Use No     History   Smoking Status    Former Smoker   Smokeless Tobacco    Never Used     History   Drug Use No       Family History:    History reviewed  No pertinent family history  Physical Exam:     Vitals:   Blood Pressure: 122/77 (02/09/19 1730)  Pulse: 103 (02/09/19 1730)  Temperature: 97 5 °F (36 4 °C) (02/09/19 1505)  Temp Source: Oral (02/09/19 1505)  Respirations: 18 (02/09/19 1730)  Height: 5' 10" (177 8 cm) (02/09/19 1505)  Weight - Scale: 79 4 kg (175 lb) (02/09/19 1505)  SpO2: 95 % (02/09/19 1730)    Physical Exam   Constitutional: He is oriented to person, place, and time  No distress  HENT:   Head: Normocephalic and atraumatic  Eyes: No scleral icterus  Neck: Neck supple  Cardiovascular:   Tachycardic, irregular  Pitting edema B/L all the way up thighs   Pulmonary/Chest:   Mild conversational dyspnea  Decreased breath sounds on the right   Abdominal: Soft  Bowel sounds are normal  He exhibits no distension  There is no tenderness  There is no rebound  Musculoskeletal:   Arthritic hands   Neurological: He is alert and oriented to person, place, and time  Skin: He is not diaphoretic  Right foot dressed  Left foot with dry scaling skin   Psychiatric: He has a normal mood and affect  Additional Data:     Lab Results: I have personally reviewed pertinent reports  Results from last 7 days  Lab Units 02/09/19  1511   WBC Thousand/uL 5 17   HEMOGLOBIN g/dL 12 1   HEMATOCRIT % 40 8   NEUTROS PCT % 75   LYMPHS PCT % 13*   MONOS PCT % 10   EOS PCT % 1       Results from last 7 days  Lab Units 02/09/19  1511   SODIUM mmol/L 139   POTASSIUM mmol/L 4 7   CHLORIDE mmol/L 99*   CO2 mmol/L 39*   BUN mg/dL 49*   CREATININE mg/dL 1 32*   ANION GAP mmol/L 1*   CALCIUM mg/dL 8 7   ALBUMIN g/dL 2 9*   TOTAL BILIRUBIN mg/dL 0 77   ALK PHOS U/L 105   ALT U/L 27   AST U/L 42   GLUCOSE RANDOM mg/dL 91       Results from last 7 days  Lab Units 02/09/19  1558   INR  1 08                   Imaging: I have personally reviewed pertinent reports  X-ray chest 2 views   ED Interpretation by Silvia Musa DO (02/09 1611)   Right pleural effusion pulmonary vascular congestion  EKG, Pathology, and Other Studies Reviewed on Admission:   · EKG: Rapid A fib  ST depression lateral leads    Allscripts / Epic Records Reviewed: Yes     ** Please Note: This note has been constructed using a voice recognition system   **

## 2019-02-09 NOTE — ASSESSMENT & PLAN NOTE
· With RVR on admission  · Patient not on anticoagulation or AV gui blockers at baseline  · Start low dose beta blocker  · Already on heparin drip for NSTEMI  · Check Mg and TSH

## 2019-02-09 NOTE — ASSESSMENT & PLAN NOTE
· F/U final x-ray report  · Suspect due to volume overload but would obtain serial imaging- may need thoracentesis if not improved with diuresis

## 2019-02-09 NOTE — ASSESSMENT & PLAN NOTE
· Last echo showed EF 40%  Obtain repeat echo  · Note also low albumin  · Home regimen: Lasix 40 mg daily and metolazone 2 5 mg daily  · IV Lasix  · I&Os, daily weights  · Consult heart failure team  · Not sure why he isn't on BB or ACEI at baseline   Start low dose metoprolol

## 2019-02-09 NOTE — ASSESSMENT & PLAN NOTE
· Possibly related to volume overload  R/O ACS  · Trend troponins  · Placed on heparin drip in ED   Will continue

## 2019-02-09 NOTE — ED ATTENDING ATTESTATION
Guanako Buckley MD, saw and evaluated the patient  I have discussed the patient with the resident/non-physician practitioner and agree with the resident's/non-physician practitioner's findings, Plan of Care, and MDM as documented in the resident's/non-physician practitioner's note, except where noted  All available labs and Radiology studies were reviewed  At this point I agree with the current assessment done in the Emergency Department  I have conducted an independent evaluation of this patient a history and physical is as follows:    [facility] for abnormal [facility]  Chronic oxygen use  Aflutter on EKG with lateral ST changes  Elevated troponin, likely due to acute CHF exacerbation  Pleural effusion and volume overload on my interpretation of the cxr  Plan - given elevated troponin and no clear contraindication, will give heparin for AC  Diuresis  Rate control  Admit       Critical Care Time    CriticalCare Time  Performed by: Mariza Quiroz MD  Authorized by: Mariza Quiroz MD     Critical care provider statement:     Critical care time (minutes):  35    Critical care time was exclusive of:  Separately billable procedures and treating other patients    Critical care was necessary to treat or prevent imminent or life-threatening deterioration of the following conditions:  Cardiac failure    Critical care was time spent personally by me on the following activities:  Ordering and review of laboratory studies, ordering and review of radiographic studies, re-evaluation of patient's condition, review of old charts, examination of patient, evaluation of patient's response to treatment, discussions with primary provider, discussions with consultants, development of treatment plan with patient or surrogate, obtaining history from patient or surrogate and blood draw for specimens

## 2019-02-09 NOTE — ED NOTES
Dr Berkley Davalos at bedside for patient castillo [facility]  02/09/19 8686

## 2019-02-09 NOTE — PLAN OF CARE
CARDIOVASCULAR - ADULT     Maintains optimal cardiac output and hemodynamic stability Progressing     Absence of cardiac dysrhythmias or at baseline rhythm Progressing        DISCHARGE PLANNING     Discharge to home or other facility with appropriate resources Progressing        INFECTION - ADULT     Absence or prevention of progression during hospitalization Progressing     Absence of fever/infection during neutropenic period Progressing        Knowledge Deficit     Patient/family/caregiver demonstrates understanding of disease process, treatment plan, medications, and discharge instructions Progressing        PAIN - ADULT     Verbalizes/displays adequate comfort level or baseline comfort level Progressing        Potential for Falls     Patient will remain free of falls Progressing        Prexisting or High Potential for Compromised Skin Integrity     Skin integrity is maintained or improved Progressing        RESPIRATORY - ADULT     Achieves optimal ventilation and oxygenation Progressing        SAFETY ADULT     Maintain or return to baseline ADL function Progressing     Maintain or return mobility status to optimal level Progressing        SKIN/TISSUE INTEGRITY - ADULT     Skin integrity remains intact Progressing     Incision(s), wounds(s) or drain site(s) healing without S/S of infection Progressing     Oral mucous membranes remain intact Progressing

## 2019-02-09 NOTE — ED PROVIDER NOTES
History  Chief Complaint   Patient presents with    Abnormal ECG     PT recently went to [facility] for rehab following a CHF exacerbation  Pt has intermittent CP and was sent in today following an abnormal EKG by staff  Pt currently has no CP at this time  HPI     72-year-old male with history of CHF EF around 40%, hypertension, proximal atrial fibrillation on anticoagulation presenting with chief complaint of abnormal EKG performed at [facility]  Patient's at [facility] for rehab from acute CHF exacerbation  Patient does not offer complaints at this time  Patient is poor historian  Patient is alert oriented x3  Patient states that he has had intermittent chest pain for the last number of days  Patient denies fever chills rigors  On review of history patient is on multiple diuretics  Patient is not on any blood thinners  Reviewed history no history of pulmonary embolism  Patient is not on anticoagulation for unclear reasons  Patient does have a history of atrial fibrillation  Prior to Admission Medications   Prescriptions Last Dose Informant Patient Reported? Taking?    albuterol (2 5 mg/3 mL) 0 083 % nebulizer solution   Yes Yes   Sig: Take 2 5 mg by nebulization every 6 (six) hours as needed for wheezing or shortness of breath   aluminum-magnesium hydroxide-simethicone (MYLANTA) 200-200-20 mg/5 mL suspension   Yes Yes   Sig: Take 30 mL by mouth every 4 (four) hours as needed for indigestion or heartburn   aspirin 81 mg chewable tablet   No Yes   Sig: Chew 1 tablet (81 mg total) daily   bifidobacterium infantis (ALIGN) capsule   Yes Yes   Sig: Take 1 capsule by mouth daily   bisacodyl (DULCOLAX) 10 mg suppository   No Yes   Sig: Insert 1 suppository (10 mg total) into the rectum daily as needed for constipation   docusate sodium (COLACE) 100 mg capsule   No Yes   Sig: Take 1 capsule (100 mg total) by mouth 2 (two) times a day   finasteride (PROSCAR) 5 mg tablet   Yes Yes   Sig: Take 5 mg by mouth daily   furosemide (LASIX) 20 mg tablet   No Yes   Sig: Take 1 tablet (20 mg total) by mouth daily   Patient taking differently: Take 40 mg by mouth daily     metolazone (ZAROXOLYN) 2 5 mg tablet   Yes Yes   Sig: Take 2 5 mg by mouth daily   senna (SENOKOT) 8 6 mg   No Yes   Sig: Take 1 tablet (8 6 mg total) by mouth daily at bedtime   silver sulfadiazine (SILVADENE,SSD) 1 % cream   Yes Yes   Sig: Apply topically daily      Facility-Administered Medications: None       Past Medical History:   Diagnosis Date    Cardiomyopathy ([facility] )     with EF of 40 % 12/15    Cellulitis of lower extremity 6/21/2018    Cervical spinal stenosis     Cervical spondylosis     [facility]     Chronic venous stasis dermatitis of both lower extremities     DDD (degenerative disc disease), lumbar     Degenerative cervical disc     Discitis of lumbar region     Elevated troponin 6/21/2018    History of BPH     Hypertension     Lumbar canal stenosis     OA (osteoarthritis)     Osteomyelitis (HCC)     Paroxysmal A-fib (HCC)     Pneumonia of both lower lobes due to infectious organism ([facility] ) 6/21/2018    Spinal stenosis     Urinary retention        Past Surgical History:   Procedure Laterality Date    BACK SURGERY      FOOT SURGERY      HIP SURGERY      MT COLONOSCOPY FLX DX W/COLLJ SPEC WHEN PFRMD N/A 11/3/2016    Procedure: COLONOSCOPY;  Surgeon: Taylor Rankin MD;  Location: [facility]; Service: Gastroenterology    ROTATOR CUFF REPAIR         History reviewed  No pertinent family history  I have reviewed and agree with the history as documented  Social History   Substance Use Topics    Smoking status: Former Smoker    Smokeless tobacco: Never Used    Alcohol use No        Review of Systems   Constitutional: Negative for chills, diaphoresis and fever  Respiratory: Positive for cough and shortness of breath  Cardiovascular: Positive for palpitations and leg swelling (Chronic)  All other systems reviewed and are negative  Physical Exam  ED Triage Vitals [02/09/19 1505]   Temperature Pulse Respirations Blood Pressure SpO2   97 5 °F (36 4 °C) (!) 124 18 116/79 95 %      Temp Source Heart Rate Source Patient Position - Orthostatic VS BP Location FiO2 (%)   Oral Monitor Lying Right arm --      Pain Score       No Pain           Orthostatic Vital Signs  Vitals:    02/09/19 1627 02/09/19 1700 02/09/19 1730 02/09/19 1800   BP: 90/74 120/73 122/77 124/70   Pulse: (!) 108 (!) 110 103 96   Patient Position - Orthostatic VS: Lying          Physical Exam   Constitutional: He appears well-developed and well-nourished  No distress  Chronically ill-appearing male with nasal cannula in place at 3 L  HENT:   Head: Normocephalic and atraumatic  Right Ear: External ear normal    Left Ear: External ear normal    Nose: Nose normal    Mouth/Throat: Oropharynx is clear and moist  No oropharyngeal exudate  Eyes: Pupils are equal, round, and reactive to light  Conjunctivae and EOM are normal  Right eye exhibits no discharge  Left eye exhibits no discharge  No scleral icterus  Neck: Normal range of motion  Neck supple  No JVD present  No tracheal deviation present  No thyromegaly present  Cardiovascular: Normal rate, regular rhythm, normal heart sounds and intact distal pulses  No murmur heard  Pulmonary/Chest: Effort normal  No stridor  No respiratory distress  He has no decreased breath sounds  He has no wheezes  He has no rhonchi  He has rales in the right lower field and the left lower field  Abdominal: Soft  Bowel sounds are normal  He exhibits no distension and no mass  There is no tenderness  There is no rebound and no guarding  Musculoskeletal: Normal range of motion  He exhibits no edema, tenderness or deformity  Legs:  Lymphadenopathy:     He has no cervical adenopathy  Neurological: He is alert  He is disoriented (Alert oriented x2 )  No cranial nerve deficit   He exhibits normal muscle tone  Coordination normal  GCS eye subscore is 4  GCS verbal subscore is 5  GCS motor subscore is 6  Moves all extremities equally and symmetrically, sensation intact throughout  Skin: Skin is warm and dry  No rash noted  He is not diaphoretic  No erythema  Psychiatric: He has a normal mood and affect  His behavior is normal  Judgment and thought content normal    Nursing note and vitals reviewed        ED Medications  Medications   heparin (porcine) injection 4,000 Units (0 Units Intravenous Hold 2/9/19 1745)   heparin (porcine) 25,000 units in 250 mL infusion (premix) (12 Units/kg/hr × 75 kg (Order-Specific) Intravenous New Bag 2/9/19 1744)   heparin (porcine) injection 4,000 Units (not administered)   heparin (porcine) injection 2,000 Units (not administered)   aspirin tablet 325 mg (not administered)   heparin (porcine) injection 6,000 Units (6,000 Units Intravenous Given 2/9/19 1604)       Diagnostic Studies  Results Reviewed     Procedure Component Value Units Date/Time    APTT six (6) hours after Heparin bolus/drip initiation or dosing change [[id number]]     Lab Status:  No result Specimen:  Blood     Specimen draw for platelet clumping [[id number]]  (Abnormal) Collected:  02/09/19 1558    Lab Status:  Final result Specimen:  Arm, Left Updated:  02/09/19 1640     Citrated Platelets 84 (L) Thousands/uL     APTT [[id number]]  (Normal) Collected:  02/09/19 1558    Lab Status:  Final result Specimen:  Blood from Arm, Left Updated:  02/09/19 1622     PTT 28 seconds     Protime-INR [[id number]]  (Normal) Collected:  02/09/19 1558    Lab Status:  Final result Specimen:  Blood from Arm, Left Updated:  02/09/19 1622     Protime 14 1 seconds      INR 1 08    CBC and differential [[id number]]  (Abnormal) Collected:  02/09/19 1511    Lab Status:  Final result Specimen:  Blood from Arm, Left Updated:  02/09/19 1607     WBC 5 17 Thousand/uL      RBC 3 97 Million/uL      Hemoglobin 12 1 g/dL Hematocrit 40 8 %       (H) fL      MCH 30 5 pg      MCHC 29 7 (L) g/dL      RDW 16 7 (H) %      Platelets -- Thousands/uL      nRBC 0 /100 WBCs      Neutrophils Relative 75 %      Immat GRANS % 0 %      Lymphocytes Relative 13 (L) %      Monocytes Relative 10 %      Eosinophils Relative 1 %      Basophils Relative 1 %      Neutrophils Absolute 3 88 Thousands/µL      Immature Grans Absolute 0 01 Thousand/uL      Lymphocytes Absolute 0 68 Thousands/µL      Monocytes Absolute 0 54 Thousand/µL      Eosinophils Absolute 0 03 Thousand/µL      Basophils Absolute 0 03 Thousands/µL     Comprehensive metabolic panel [[id number]]  (Abnormal) Collected:  02/09/19 1511    Lab Status:  Final result Specimen:  Blood from Arm, Left Updated:  02/09/19 1554     Sodium 139 mmol/L      Potassium 4 7 mmol/L      Chloride 99 (L) mmol/L      CO2 39 (H) mmol/L      ANION GAP 1 (L) mmol/L      BUN 49 (H) mg/dL      Creatinine 1 32 (H) mg/dL      Glucose 91 mg/dL      Calcium 8 7 mg/dL      AST 42 U/L      ALT 27 U/L      Alkaline Phosphatase 105 U/L      Total Protein 7 1 g/dL      Albumin 2 9 (L) g/dL      Total Bilirubin 0 77 mg/dL      eGFR 50 ml/min/1 73sq m     Narrative:         National Kidney Disease Education Program recommendations are as follows:  GFR calculation is accurate only with a steady state creatinine  Chronic Kidney disease less than 60 ml/min/1 73 sq  meters  Kidney failure less than 15 ml/min/1 73 sq  meters      NT-BNP PRO (BNP for [place], [place], [place], [place], [place], [facility], [facility], [place] campuses) [[id number]]  (Abnormal) Collected:  02/09/19 1511    Lab Status:  Final result Specimen:  Blood from Arm, Left Updated:  02/09/19 1554     NT-proBNP 15,306 (H) pg/mL     Troponin I [[id number]]  (Abnormal) Collected:  02/09/19 1511    Lab Status:  Final result Specimen:  Blood from Arm, Left Updated:  02/09/19 1549     Troponin I 0 28 (H) ng/mL                  X-ray chest 2 views   ED Interpretation by Fahad Laurent DO (02/09 1611)   Right pleural effusion pulmonary vascular congestion  Procedures  ECG 12 Lead Documentation  Date/Time: 2/9/2019 3:43 PM  Performed by: Cristóbal Johns by: Haydee Farrar     Indications / Diagnosis:  Chest pain  ECG reviewed by me, the ED Provider: yes    Patient location:  ED  Interpretation:     Interpretation: abnormal    Rate:     ECG rate:  107    ECG rate assessment: normal    Rhythm:     Rhythm: sinus rhythm    QRS:     QRS axis:  Right    QRS intervals:  Normal  Conduction:     Conduction: abnormal (Variable atrial fibrillation, atrial flutter)    ST segments:     ST segments:  Abnormal    Depression:  V4, V5 and V6  T waves:     T waves: non-specific            Phone Consults  ED Phone Contact    ED Course  ED Course as of Feb 09 1818   Sat Feb 09, 2019   1505   Acute/chronic systolic HF  He was treated with Lasix 40 IV b i d  CXR from 6/23 shows improvement in bilateral pleural effusions, but not completely resolved, personally reviewed  BUN/Cr starting to trend up  Would transition to Lasix 40 mg bid for 5 days, then decrease to 40 daily        2  Nonischemic cardiomyopathy with an EF of 40%  He has been started on metoprolol tartrate 50 b i d , transitioned to Metoprolol succinate for discharge  His home lisinopril 2 5 mg daily has been resumed and titrated up  Currently tolerating current dosing, continue to monitor      3    Paroxysmal atrial fibrillation  He is on metoprolol  Heart rate is controlled on telemetry  He was not on anticoagulation prior to admission for unclear reasons      4  Hypertension  Blood pressure is controlled on current regimen  Monitor with titration of heart failure regimen      5  Type II NSTEMI  Abnormal troponin likely due to volume overload/acute on chronic systolic HF exacerbation        1621 CHF exacerbation will administer Lasix at this time   NT-proBNP: (!) 15,306   1718 Blood Pressure: 120/73   1718 Pulse: (!) 110   1718 Respirations: 18   1718 Respirations: [id number] Patient reassessed at this time  Patient remains on 3 L  Patient blood pressure adequate  Heart rate 104  MDM  Number of Diagnoses or Management Options  Atrial flutter by electrocardiogram [facility]):   Cardiomyopathy [facility]):   CHF exacerbation [facility]):   Elevated troponin:   Pleural effusion, right:   Diagnosis management comments: 81-year-old male presenting with episodes of chest pain at home, from [facility]  On exam patient is on home 3 L  Patient currently saturating 95%  Patient has no increased work of breathing  Patient has crackles bilaterally  Differential diagnosis includes but not limited to ACS, NSTEMI type 1 type 2, STEMI, CHF exacerbation, pneumonia  Differential diagnosis includes these but not limited to  EKG shows a flutter with variable a fibrillation  Lateral ST depressions, troponin positive  Heparin started  NSTEMI 1 versus 2  Chest x-ray shows pleural effusion with vascular overload  ProBNP elevated  Patient started on heparin  Patient had borderline hypertension this pain chronic problem for the patient  Diuresis as per [facility] team   Patient admitted to [facility] to step-down for borderline hypertension with CHF exacerbation  Patient reassessed multiple times, patient remained comfortable on home O2          Disposition  Final diagnoses:   CHF exacerbation ([facility] )   Elevated troponin   Pleural effusion, right   Atrial flutter by electrocardiogram ([facility] )   Cardiomyopathy ([facility] )     Time reflects when diagnosis was documented in both MDM as applicable and the Disposition within this note     Time User Action Codes Description Comment    2/9/2019  4:39 PM Robert Yoon Add [I50 9] CHF exacerbation ([facility] )     2/9/2019  4:39 PM Berenice Hiss [R74 8] Elevated troponin     2/9/2019  4:39 PM Dennis Wallace Add [J90] Pleural effusion, right     2/9/2019  4:40 PM Berenice Hiss [I48 92] Atrial flutter by electrocardiogram ([facility] )     2/9/2019  4:40 PM Stephanie Wallace Add [I42 9] Cardiomyopathy ([facility] )     2/9/2019  5:58 PM Jonh Beth Add [S91 [id number]] Wound of right foot       ED Disposition     ED Disposition Condition Date/Time Comment    Admit  Sat Feb 9, 2019  4:40 PM Case was discussed with [facility] level two step and the patient's admission status was agreed to be Admission Status: inpatient status to the service of [facility]   Follow-up Information    None         Patient's Medications   Discharge Prescriptions    No medications on file     No discharge procedures on file  ED Provider  Attending physically available and evaluated Jass Gupta I managed the patient along with the ED Attending      Electronically Signed by         Vicenta Vega DO  02/09/19 5920

## 2019-02-10 PROBLEM — D69.6 THROMBOCYTOPENIA (HCC): Status: ACTIVE | Noted: 2019-01-01

## 2019-02-10 NOTE — UTILIZATION REVIEW
Initial Clinical Review    Admission: Date/Time/Statement: 2/9/19 @ 1638   Orders Placed This Encounter   Procedures    Inpatient Admission (expected length of stay for this patient Order details is greater than two midnights)     Standing Status:   Standing     Number of Occurrences:   1     Order Specific Question:   Admitting Physician     Answer:   Cee Harley [[id number]]     Order Specific Question:   Level of Care     Answer:   Level 2 Stepdown / HOT [14]     Order Specific Question:   Estimated length of stay     Answer:   More than 2 Midnights     Order Specific Question:   Certification     Answer:   I certify that inpatient services are medically necessary for this patient for a duration of greater than two midnights  See H&P and MD Progress Notes for additional information about the patient's course of treatment  ED: Date/Time/Mode of Arrival:   ED Arrival Information     Expected Arrival Acuity Means of Arrival Escorted By Service Admission Type    - 2/9/2019 15:02 Urgent Ambulance [facility] Hospitalist Urgent    Arrival Complaint    Chest Pain         Chief Complaint:   Chief Complaint   Patient presents with    Abnormal ECG     PT recently went to [facility] for rehab following a CHF exacerbation  Pt has intermittent CP and was sent in today following an abnormal EKG by staff  Pt currently has no CP at this time  History of Illness: 82 y o  male with a history of systolic heart failure, PAF, chronic respiratory failure on 2L O2, and CKD stage 3 who presents with abnormal EKG, SOB, and chest pain  Patient is a poor historian  He was recently admitted at the end of January with RICKI and was given IVF hydration  His Lasix was cut in half to 20 mg daily and he was discharged to rehab  He reports progressive exertional dyspnea over past few weeks  He also reports intermittent right sided chest pain that he describes as pressure sensation   However, he reports he gets the pain when staff adjusts him in bed  He does not get chest pain with exertion  He has chronic lower extremity edema and wounds and is unsure if his swelling is changed  He also reports trouble with swallowing, both water and solids  States this has been going on "a long time" and is unsure if he was ever evaluated for this  Does not follow special diet  ED Vital Signs:   ED Triage Vitals [02/09/19 1505]   Temperature Pulse Respirations Blood Pressure SpO2   97 5 °F (36 4 °C) (!) 124 18 116/79 95 %      Temp Source Heart Rate Source Patient Position - Orthostatic VS BP Location FiO2 (%)   Oral Monitor Lying Right arm --      Pain Score       No Pain        Wt Readings from Last 1 Encounters:   02/10/19 84 3 kg (185 lb 12 8 oz)     Vital Signs (abnormal): RR , RR 18-24    Pertinent Labs/Diagnostic Test Results:   Lab Units 02/09/19  1511   SODIUM mmol/L 139   POTASSIUM mmol/L 4 7   CHLORIDE mmol/L 99*   CO2 mmol/L 39*   BUN mg/dL 49*   CREATININE mg/dL 1 32*   ANION GAP mmol/L 1*   CALCIUM mg/dL 8 7   ALBUMIN g/dL 2 9*     Trop  0 28 -- 0 32 -- 0 31  BNP 15,306    CXR -- Right pleural effusion pulmonary vascular congestion  EKG: Rapid A fib   ST depression lateral leads    ED Treatment:   Medication Administration from 02/09/2019 1502 to 02/09/2019 1825       Date/Time Order Dose Route Action     02/09/2019 1604 heparin (porcine) injection 6,000 Units 6,000 Units Intravenous Given     02/09/2019 1606 heparin (porcine) 25,000 units in 250 mL infusion (premix) 18 Units/kg/hr Intravenous New Bag     02/09/2019 1744 heparin (porcine) 25,000 units in 250 mL infusion (premix) 12 Units/kg/hr Intravenous New Bag     Past Medical/Surgical History:   Past Medical History:   Diagnosis Date    Cardiomyopathy ([address] )     Cellulitis of lower extremity 6/21/2018    Cervical spinal stenosis     Cervical spondylosis     CHF (congestive heart failure) (HCC)     Chronic venous stasis dermatitis of both lower extremities     DDD (degenerative disc disease), lumbar     Degenerative cervical disc     Discitis of lumbar region     Elevated troponin 6/21/2018    History of BPH     Hypertension     Lumbar canal stenosis     OA (osteoarthritis)     Osteomyelitis (HCC)     Paroxysmal A-fib (HCC)     Pneumonia of both lower lobes due to infectious organism ([place] [address] ) 6/21/2018    Spinal stenosis     Urinary retention      Admitting Diagnosis: Chest pain [R07 9]  Cardiomyopathy ([place] [address] ) [I42 9]  Elevated troponin [R74 8]  CHF exacerbation (HCC) [I50 9]  Pleural effusion, right [J90]  Atrial flutter by electrocardiogram ([place] [address] ) [I48 92]  Wound of right foot [S91 301A]  Age/Sex: 82 y o  male     Assessment/Plan:   Acute on chronic systolic CHF:  Patient seems to be volume overloaded on exam   Will start 40 mg IV Lasix b i d  Although patient's weight is lower than what he was discharged  Continue to monitor closely  Patient currently not on any beta-blocker or ACE/Arb - ? Secondary to low blood pressure  Start low-dose metoprolol for now      Elevated troponin:  NSTEMI type 1 vs Type 2  Most likely type 2 from acute CHF  Although cannot rule out type 1 completely at present as patient was complaining of chest discomfort prior to admission  For now will continue heparin drip overnight if troponin remains stable, consider stopping heparin drip tomorrow      Paroxysmal atrial fibrillation with RVR on admission:  Heart rate still borderline high  Will start low-dose metoprolol  Patient not on any beta-blockers or anticoagulation      Bilateral pleural effusion:  Right significantly greater than left  Right side seems to be slightly worse than his previous x-ray  Most likely due to volume overload and CHF  Continue to monitor with diuresis  Patient might need therapeutic thoracentesis        Admission Orders:  Scheduled Meds:   Current Facility-Administered Medications:  acetaminophen 650 mg Oral Q6H PRN   albuterol 2 5 mg Nebulization Q6H PRN   aluminum-magnesium hydroxide-simethicone 30 mL Oral Q4H PRN   aspirin 81 mg Oral Daily   docusate sodium 100 mg Oral BID   finasteride 5 mg Oral Daily   [START ON 2/11/2019] furosemide 40 mg Intravenous BID (diuretic)   heparin (porcine) 5,000 Units Subcutaneous Q8H [facility]   metolazone 2 5 mg Oral Daily   metoprolol tartrate 12 5 mg Oral Q12H JULIETH   ondansetron 4 mg Intravenous Q8H PRN   saccharomyces boulardii 250 mg Oral BID   senna 1 tablet Oral HS     Telem  O2 2-3 lpm nc  Daily wts  I/O  Sodium 2 gm, fluid restriction 1800 ml  Up with assist  SCD's  Consult podiatry, heart failure service  Wound care B/L feet daily  prevalon boots    Cardiology consult:  Assessment:   1  Acute on chronic systolic heart failure exacerbation  2  History of nonischemic cardiomyopathy last documented LVEF 40%  3  NSTEMI likely type 2 in the setting of acute CHF exacerbation  4  Paroxysmal atrial fibrillation  5  Hypertension  6  Chronic bilateral lower extremity edema with chronic venous insufficiency  7   Chronic kidney disease     -EKG on admission shows atrial fibrillation with occasional PVC  -troponins trended had appear to have peak at 0 32  -NT-proBNP elevated [place] [address] on admission up from [address] in June of 2018  -chest x-ray with official read pending however does appear to have bilateral pleural effusions  -patient with approximately 2 L urine output since admission  -would recommend continuing IV diuretic therapy with 40 mg furosemide IV twice daily  -continue metolazone 2 5 mg daily  -continue metoprolol tartrate 12 5 mg twice daily for rate control of atrial fibrillation in acute exacerbation of CHF however will need to monitor this closely with hopeful transition to succinate once patient is more euvolemic  -can discontinue heparin infusion for ACS at this time as troponin appears to be elevated secondary to CHF exacerbation  -if able to tolerate would also consider addition of low-dose ACE-inhibitor  -transthoracic echocardiogram from June of 8816 read as systolic function mildly reduced with estimated LVEF 40% and mild diffuse hypokinesis with mild aortic stenosis, moderate tricuspid regurgitation with estimated peak PA pressure of 55-60 suggestive of moderate pulmonary hypertension and mild-to-moderate mitral regurgitation  -monitor strict I&Os and daily weights  -keep patient on sodium and fluid restricted cardiovascular diet           Network Utilization Review Department  Phone: [phone]; Fax [phone]  [email or web address]  ATTENTION: Please call with any questions or concerns to [phone]  and carefully listen to the prompts so that you are directed to the right person  Send all requests for admission clinical reviews, approved or denied determinations and any other requests to fax [phone]   All voicemails are confidential

## 2019-02-10 NOTE — SOCIAL WORK
Pt is a 30 day readmission [facility] pt for CHF  Cm met with pt to discuss DCP and cm role  Pt states that he was at [facility] for rehab prior to admission  Prior to admission pt used a RW with ambulation short distances and a w/c otherwise  Pt reports needing assistance with ADLS  CM contacted latanya Warner, pt was living in a 2nd floor apt and went to [facility] prior to previous hospitalization  He is unsure what the plan will be at d/c and will f/u with cm  CM reviewed d/c planning process including the following: identifying help at home, patient preference for d/c planning needs, Discharge Lounge, [facility] Meds to Bed program, availability of treatment team to discuss questions or concerns patient and/or family may have regarding understanding medications and recognizing signs and symptoms once discharged  CM also encouraged patient to follow up with all recommended appointments after discharge  Patient advised of importance for patient and family to participate in managing patients medical well being

## 2019-02-10 NOTE — CONSULTS
Consultation - Cardiology   Cedrick Goode 82 y o  male MRN: [id number]  Unit/Bed#: [facility] 520-01 Encounter: [id number]    Assessment/Plan     Assessment:    1  Acute on chronic systolic heart failure exacerbation  2  History of nonischemic cardiomyopathy last documented LVEF 40%  3  NSTEMI likely type 2 in the setting of acute CHF exacerbation  4  Paroxysmal atrial fibrillation  5  Hypertension  6  Chronic bilateral lower extremity edema with chronic venous insufficiency  7   Chronic kidney disease    -EKG on admission shows atrial fibrillation with occasional PVC  -troponins trended had appear to have peak at 0 32  -NT-proBNP elevated at 04[address] on admission up from 6165 in June of 2018  -chest x-ray with official read pending however does appear to have bilateral pleural effusions  -patient with approximately 2 L urine output since admission  -would recommend continuing IV diuretic therapy with 40 mg furosemide IV twice daily  -continue metolazone 2 5 mg daily  -continue metoprolol tartrate 12 5 mg twice daily for rate control of atrial fibrillation in acute exacerbation of CHF however will need to monitor this closely with hopeful transition to succinate once patient is more euvolemic  -can discontinue heparin infusion for ACS at this time as troponin appears to be elevated secondary to CHF exacerbation  -if able to tolerate would also consider addition of low-dose ACE-inhibitor  -transthoracic echocardiogram from June of 4112 read as systolic function mildly reduced with estimated LVEF 40% and mild diffuse hypokinesis with mild aortic stenosis, moderate tricuspid regurgitation with estimated peak PA pressure of 55-60 suggestive of moderate pulmonary hypertension and mild-to-moderate mitral regurgitation  -monitor strict I&Os and daily weights  -keep patient on sodium and fluid restricted cardiovascular diet            History of Present Illness   Physician Requesting Consult: Cha Herbert MD  Reason for Consult / Principal Problem:  Acute on chronic systolic heart failure exacerbation  HPI: Shamika Cuevas is a 82y o  year old male past medical history significant for nonischemic cardiomyopathy estimated LVEF at most recent transthoracic echocardiogram showed 40% with mildly reduced left ventricular systolic function, hypertension, paroxysmal atrial fibrillation on oral anticoagulation in the outpatient setting, chronic kidney disease, chronic bilateral lower extremity edema with venous insufficiency who presents to [facility] from skilled nursing facility with acute on chronic shortness of breath  The patient is a relatively poor historian and most of history is obtained from chart review however the patient does note that at the nursing facility he was having significant shortness of breath, denied any chest pain or palpitations at that time  -currently notes shortness of breath slowly improving and urine output is significantly increased  He states that if he lies flat he gets short of breath but denies any chest pain or palpitations at this time  Consults    Review of Systems   Constitutional: Negative for chills and fever  HENT: Negative for trouble swallowing and voice change  + chronically hard of hearing   Eyes: Negative for pain and redness  Respiratory: Positive for shortness of breath  Negative for cough  + orthopnea   Cardiovascular: Positive for leg swelling  Negative for chest pain and palpitations  Gastrointestinal: Negative for abdominal pain, diarrhea and nausea  Genitourinary: Negative for dysuria  Musculoskeletal: Positive for arthralgias  Negative for neck pain and neck stiffness  Neurological: Negative for syncope and light-headedness  Psychiatric/Behavioral: Negative for agitation  All other systems reviewed and are negative        Historical Information   Past Medical History:   Diagnosis Date    Cardiomyopathy ([address] )     with EF of 40 % 12/15    Cellulitis of lower extremity 2018    Cervical spinal stenosis     Cervical spondylosis     CHF (congestive heart failure) (HCC)     Chronic venous stasis dermatitis of both lower extremities     DDD (degenerative disc disease), lumbar     Degenerative cervical disc     Discitis of lumbar region     Elevated troponin 2018    History of BPH     Hypertension     Lumbar canal stenosis     OA (osteoarthritis)     Osteomyelitis (HCC)     Paroxysmal A-fib (HCC)     Pneumonia of both lower lobes due to infectious organism ([facility] ) 2018    Spinal stenosis     Urinary retention      Past Surgical History:   Procedure Laterality Date    BACK SURGERY      FOOT SURGERY      HIP SURGERY      [place] COLONOSCOPY FLX DX W/COLLJ SPEC WHEN PFRMD N/A 11/3/2016    Procedure: COLONOSCOPY;  Surgeon: Jake Goldman MD;  Location: [facility]; Service: Gastroenterology    ROTATOR CUFF REPAIR       Social History     Substance and Sexual Activity   Alcohol Use No     Social History     Substance and Sexual Activity   Drug Use No     Social History     Tobacco Use   Smoking Status Former Smoker    Packs/day: 3 00    Types: Cigarettes    Last attempt to quit: [address] Years since quittin 1   Smokeless Tobacco Never Used     Family History: History reviewed  No pertinent family history  Meds/Allergies   all current active meds have been reviewed  Allergies   Allergen Reactions    Morphine        Objective   Vitals: Blood pressure 101/63, pulse 87, temperature 97 8 °F (36 6 °C), temperature source Oral, resp  rate 18, height 5' 10" (1 778 m), weight 84 3 kg (185 lb 12 8 oz), SpO2 94 %    Orthostatic Blood Pressures      Most Recent Value   Blood Pressure  101/63 filed at 02/10/2019 6418   Patient Position - Orthostatic VS  Lying filed at 02/10/2019 0300            Intake/Output Summary (Last 24 hours) at 2/10/2019 1055  Last data filed at 2/10/2019 1001  Gross per 24 hour   Intake 354 08 ml Output 1715 ml   Net -1360 92 ml       Invasive Devices     Peripheral Intravenous Line            Peripheral IV 02/09/19 Left Forearm less than 1 day    Peripheral IV 02/09/19 Right Forearm less than 1 day                Physical Exam   Constitutional: No distress  HENT:   Head: Normocephalic and atraumatic  Poor dentition, nasal cannula oxygen in place   Eyes: Conjunctivae are normal  No scleral icterus  Neck: JVD present  No tracheal deviation present  Cardiovascular:   Irregularly irregular rate and rhythm, +JAMEEL   Pulmonary/Chest:   Decreased breath sounds bilaterally particularly in the bases with crackles heard in bilateral lower lung fields   Abdominal: Soft  Bowel sounds are normal  There is no tenderness  Musculoskeletal: He exhibits edema  Neurological:   Awake, alert, able answer questions but is hard of hearing   Skin: Skin is warm and dry  He is not diaphoretic  Psychiatric:   Pleasant       Lab Results: I have personally reviewed pertinent lab results  Imaging: I have personally reviewed pertinent reports      EKG:  Currently rate controlled atrial fibrillation on telemetry monitoring  VTE Prophylaxis: Heparin    Code Status: Level 1 - Full Code  Advance Directive and Living Will:      Power of :    POLST:

## 2019-02-10 NOTE — RESPIRATORY THERAPY NOTE
RT Protocol Note  Marycruz Lucas 82 y o  male MRN: [id number]  Unit/Bed#: [facility] 520-01 Encounter: [id number]    Assessment    Principal Problem:    Acute on chronic systolic heart failure (HCC)  Active Problems:    Paroxysmal A-fib (HCC)    Pleural effusion    NSTEMI (non-ST elevated myocardial infarction) (HCC)    Dysphagia    Stage 3 chronic kidney disease (HCC)    Chronic respiratory failure with hypoxia ([facility])      Home Pulmonary Medications:  Albuterol PRN       Past Medical History:   Diagnosis Date    [address]omy[address] [address] )     with EF of 40 % 12/15    Cellulitis of lower extremity 6/21/2018    Cervical spinal stenosis     Cervical spondylosis     CHF (congestive heart failure) ([facility])     Chronic venous stasis dermatitis of both lower extremities     DDD (degenerative disc disease), lumbar     Degenerative cervical disc     Discitis of lumbar region     Elevated troponin 6/21/2018    History of BPH     Hypertension     Lumbar canal stenosis     OA (osteoarthritis)     Osteomyelitis (HCC)     Paroxysmal A-fib ([facility])     Pneumonia of both lower lobes due to infectious organism ([facility] ) 6/21/2018    Spinal stenosis     Urinary retention      Social History     Social History    Marital status: Single     Spouse name: N/A    Number of children: N/A    Years of education: N/A     Social History Main Topics    Smoking status: Former Smoker     Packs/day: 3 00     Types: Cigarettes     Quit date: 1960    Smokeless tobacco: Never Used    Alcohol use No    Drug use: No    Sexual activity: Not Asked     Other Topics Concern    None     Social History Narrative    None       Subjective         Objective    Physical Exam:   Assessment Type: Assess only  General Appearance: Awake  Respiratory Pattern: Tachypneic  R Breath Sounds: Diminished  L Breath Sounds: Crackles  Cough: Non-productive    Vitals:  Blood pressure 124/70, pulse (!) 112, temperature 97 5 °F (36 4 °C), temperature source Oral, resp  rate (!) 24, height 5' 10" (1 778 m), weight 87 6 kg (193 lb 2 oz), SpO2 93 %  Imaging and other studies: I have personally reviewed pertinent reports  Plan    Respiratory Plan: Home Bronchodilator Patient pathway        Resp Comments: Pt admit for DX: CHF  No formal documentation of COPD  Prescribed albuterol PRN at home  CXR shows B/L effusions  HR/RR/RjB5=415/24/93%  Pt dyspneic  Sitting upright in bed  Being given diuretic  Will continue home bronchodilator Rx  Albuterol PRN

## 2019-02-10 NOTE — SPEECH THERAPY NOTE
Speech Language/Pathology  Speech/Language Pathology  Assessment    Patient Name: Senait Fofana  MYBKC'[id number] Date: 2/10/2019     Problem List  Patient Active Problem List   Diagnosis    Paroxysmal A-fib ([facility] [address] )    Essential hypertension    Acute on chronic systolic heart failure ([address] )    Cardiomyopathy ([address] )    Chronic venous insufficiency    Type 2 myocardial infarction ([address] )    Pleural effusion, bilateral    Acute kidney injury ([address] )    Ambulatory dysfunction    Nodule of kidney    Pleural effusion    NSTEMI (non-ST elevated myocardial infarction) ([address] )    Dysphagia    Stage 3 chronic kidney disease ([address] )    Chronic respiratory failure with hypoxia ([facility] [address] )     Past Medical History  Past Medical History:   Diagnosis Date    Cardiomyopathy [facility])     with EF of 40 % 12/15    Cellulitis of lower extremity 6/21/2018    Cervical spinal stenosis     Cervical spondylosis     CHF (congestive heart failure) (HCC)     Chronic venous stasis dermatitis of both lower extremities     DDD (degenerative disc disease), lumbar     Degenerative cervical disc     Discitis of lumbar region     Elevated troponin 6/21/2018    History of BPH     Hypertension     Lumbar canal stenosis     OA (osteoarthritis)     Osteomyelitis (HCC)     Paroxysmal A-fib (HCC)     Pneumonia of both lower lobes due to infectious organism ([address] ) 6/21/2018    Spinal stenosis     Urinary retention      Past Surgical History  Past Surgical History:   Procedure Laterality Date    BACK SURGERY      FOOT SURGERY      HIP SURGERY      FL COLONOSCOPY FLX DX W/COLLJ SPEC WHEN PFRMD N/A 11/3/2016    Procedure: COLONOSCOPY;  Surgeon: Loren Covington MD;  [facility]: [facility]; Service: Gastroenterology    ROTATOR CUFF REPAIR       Senait Fofana is a 82 y o  male with a history of systolic heart failure, PAF, chronic respiratory failure on 2L O2, and CKD stage 3 who presents with abnormal EKG, SOB, and chest pain   Patient is a poor historian  He was recently admitted at the end of January with RICKI and was given IVF hydration  His Lasix was cut in half to 20 mg daily and he was discharged to rehab  He reports progressive exertional dyspnea over past few weeks  He also reports intermittent right sided chest pain that he describes as pressure sensation  However, he reports he gets the pain when staff adjusts him in bed  He does not get chest pain with exertion  He has chronic lower extremity edema and wounds and is unsure if his swelling is changed  He also reports trouble with swallowing, both water and solids  States this has been going on "a long time" and is unsure if he was ever evaluated for this  Does not follow special diet         Bedside Swallow Evaluation:    Summary:  Pt presents w/ functional oropharyngeal swallow skills for regular diet c thin liquids c no overt s/s penetration/aspiration noted  Pt reported feeling of food sticking at times and pointing to the sternal area  Educated pt on choosing softer solids, adding extra sauce/gravy and utilizing a liquid wash  Pt expressed understanding  If symptoms persist and begin to negatively impact pt, would recommend f u c GI to rule out esophageal dysphagia  Of note, after leaving pt's room, pt noted to have coughing but suspect unrelated  Recommendations:  Diet: regular  Liquid: thin  Meds: as tolerated  Supervision: assist as needed  Positioning:Upright  Strategies: Pt to take PO/Meds only when fully alert and upright  Oral care: encourage twice daily brushing    Eval only, No f/u tx indicated  Consider consult w/:  Nutrition      Reason for consult:  Determine safest and least restrictive diet  C/o solid food dysphagia  C/o liquid dysphagia    Current diet:  Reg/thin  Premorbid diet[de-identified]  Reg/thin  Previous VBS:  No  O2 requirement:  2L NC  Voice/Speech:  WFL  Follows commands:   Yes                        Cognitive Status:  Grossly intact  Oral mech exam:  Adequate dentition  Full symmetry    Items administered:  Puree, hard solid,  thin liquids  Liquids were taken by straw       Oral stage:  Lip closure: adequate  Mastication: adequate   Bolus formation: adequate  Bolus control: adequate  Transfer: prompt  Oral residue: no  Pocketing: no    Pharyngeal stage:  Swallow promptness: prompt  Laryngeal rise: adequate  Wet voice: no  Throat clear: no  Cough: no  Secondary swallows: no  Audible swallows: no  No overt s/s aspiration    Esophageal stage:  No s/s reported    Results d/w:  Pt

## 2019-02-10 NOTE — ASSESSMENT & PLAN NOTE
· With RVR on admission  · Patient not on anticoagulation or AV gui blockers at baseline  · Started low dose beta blocker  · TSH high   Check free T4

## 2019-02-10 NOTE — CONSULTS
Consult - Podiatry   Ly Arreguin 82 y o  male MRN: [id number]  Unit/Bed#: [facility] 520-01 Encounter: [id number]    Assessment/Plan     Assessment:  1  Superficial fissures with eschar to b/l feet  2  Xerosis  3  ID maceration  4  CHF  5  CKD stage 3      Plan:  - applied betadine paint to all fissured eschars and interspaces  - no open wounds to legs  No dressing needed to b/l LE  - will put in nursing orders for daily betadine paint  Podiatry to see intermittently and possibly sign off  - recommend elevating heels off of bed and applying moisturizer to dorsal/plantar feet while avoiding interspaces  - rest of care per primary    History of Present Illness     HPI:  Ly Arreguin is a 82 y o  male who presents with bilateral superficial fissured eschars and interdigital maceration  Patient is extremely poor historian and history obtained by chart review  Patient admitted for acute CHF  Patient was found to be in rapid AFib at [facility] nursing [facility] and 1 sent to the emergency room for evaluation  It is unknown whether patient sees a podiatrist   Patient on nasal cannula  Consults  Review of Systems   Constitutional: Negative  HENT: Negative  Eyes: Negative  Respiratory: Negative  Cardiovascular: Negative  Gastrointestinal: Negative  Musculoskeletal:  Lower extremity edema  Skin:  Fissure/eschar  Neurological: Negative  Psych: negative         Historical Information   Past Medical History:   Diagnosis Date    Cardiomyopathy [facility])     with EF of 40 % 12/15    Cellulitis of lower extremity 6/21/2018    Cervical spinal stenosis     Cervical spondylosis     CHF (congestive heart failure) (HCC)     Chronic venous stasis dermatitis of both lower extremities     DDD (degenerative disc disease), lumbar     Degenerative cervical disc     Discitis of lumbar region     Elevated troponin 6/21/2018    History of BPH     Hypertension     Lumbar canal stenosis     OA (osteoarthritis)  Osteomyelitis (HCC)     Paroxysmal A-fib ([facility] [address] )     Pneumonia of both lower lobes due to infectious organism ([facility] [address] ) 2018    Spinal stenosis     Urinary retention      Past Surgical History:   Procedure Laterality Date    BACK SURGERY      FOOT SURGERY      HIP SURGERY      AK COLONOSCOPY FLX DX W/COLLJ SPEC WHEN PFRMD N/A 11/3/2016    Procedure: COLONOSCOPY;  Surgeon: Gricel Beckwith MD;  Location:  GI LAB; Service: Gastroenterology    ROTATOR CUFF REPAIR       Social History   Social History     Substance and Sexual Activity   Alcohol Use No     Social History     Substance and Sexual Activity   Drug Use No     Social History     Tobacco Use   Smoking Status Former Smoker    Packs/day: 3 00    Types: Cigarettes    Last attempt to quit: Alejo Colace Years since quittin 1   Smokeless Tobacco Never Used     Family History: History reviewed  No pertinent family history      Meds/Allergies   Medications Prior to Admission   Medication    albuterol (2 5 mg/3 mL) 0 083 % nebulizer solution    aluminum-magnesium hydroxide-simethicone (MYLANTA) 200-200-20 mg/5 mL suspension    aspirin 81 mg chewable tablet    bifidobacterium infantis (ALIGN) capsule    bisacodyl (DULCOLAX) 10 mg suppository    docusate sodium (COLACE) 100 mg capsule    finasteride (PROSCAR) 5 mg tablet    furosemide (LASIX) 20 mg tablet    metolazone (ZAROXOLYN) 2 5 mg tablet    senna (SENOKOT) 8 6 mg    silver sulfadiazine (SILVADENE,SSD) 1 % cream     Allergies   Allergen Reactions    Morphine        Objective   First Vitals:   Blood Pressure: 116/79 (19 1505)  Pulse: (!) 124 (19 1505)  Temperature: 97 5 °F (36 4 °C) (19 1505)  Temp Source: Oral (19 1505)  Respirations: 18 (19 1505)  Height: 5' 10" (177 8 cm) (19 1505)  Weight - Scale: 79 4 kg (175 lb) (19 1505)  SpO2: 95 % (19 1505)    Current Vitals:   Blood Pressure: 101/63 (02/10/19 0826)  Pulse: 87 (02/10/19 2446)  Temperature: 97 8 °F (36 6 °C) (02/10/19 0700)  Temp Source: Oral (02/10/19 0700)  Respirations: 18 (02/10/19 0700)  Height: 5' 10" (177 8 cm) (02/09/19 1800)  Weight - Scale: 84 3 kg (185 lb 12 8 oz) (02/10/19 0600)  SpO2: 94 % (02/10/19 0826)        /63 (BP Location: Left arm)   Pulse 87   Temp 97 8 °F (36 6 °C) (Oral)   Resp 18   Ht 5' 10" (1 778 m)   Wt 84 3 kg (185 lb 12 8 oz)   SpO2 94%   BMI 26 66 kg/m²      General Appearance:    Alert, cooperative, no distress   Head:    Normocephalic, without obvious abnormality, atraumatic   Eyes:    PERRL, conjunctiva/corneas clear, EOM's intact        Nose:   Moist mucous membranes   Neck:   Supple, symmetrical, trachea midline   Back:     Symmetric   Lungs:     Respirations unlabored   Heart:    Regular rate and rhythm, S1 and S2 normal, no murmur, rub   or gallop   Abdomen:     Soft, non-tender   Extremities:   +2 bilateral lower extremity edema  Unable to assess manual muscle testing   Pulses:   Dopplerable pedal pulses  Cap refill time is mildly sluggish (3 secs)   Skin:   Atrophic skin with xerosis noted to bilateral feet plantarly and dorsally  Superficial eschars noted to medial 1st MPJ area  No drainage or clinical signs of infection  Interdigital maceration noted however no open wounds appreciated     Neurologic:   Unable to assess           Lab Results:   Admission on 02/09/2019   Component Date Value    Troponin I 02/09/2019 0 28*    WBC 02/09/2019 5 17     RBC 02/09/2019 3 97     Hemoglobin 02/09/2019 12 1     Hematocrit 02/09/2019 40 8     MCV 02/09/2019 103*    MCH 02/09/2019 30 5     MCHC 02/09/2019 29 7*    RDW 02/09/2019 16 7*    Platelets 77/09/1251      nRBC 02/09/2019 0     Neutrophils Relative 02/09/2019 75     Immat GRANS % 02/09/2019 0     Lymphocytes Relative 02/09/2019 13*    Monocytes Relative 02/09/2019 10     Eosinophils Relative 02/09/2019 1     Basophils Relative 02/09/2019 1     Neutrophils Absolute 02/09/2019 3 88     Immature Grans Absolute 02/09/2019 0 01     Lymphocytes Absolute 02/09/2019 0 68     Monocytes Absolute 02/09/2019 0 54     Eosinophils Absolute 02/09/2019 0 03     Basophils Absolute 02/09/2019 0 03     Sodium 02/09/2019 139     Potassium 02/09/2019 4 7     Chloride 02/09/2019 99*    CO2 02/09/2019 39*    ANION GAP 02/09/2019 1*    BUN 02/09/2019 49*    Creatinine 02/09/2019 1 32*    Glucose 02/09/2019 91     Calcium 02/09/2019 8 7     AST 02/09/2019 42     ALT 02/09/2019 27     Alkaline Phosphatase 02/09/2019 105     Total Protein 02/09/2019 7 1     Albumin 02/09/2019 2 9*    Total Bilirubin 02/09/2019 0 77     eGFR 02/09/2019 50     NT-proBNP 02/09/2019 15,306*    PTT 02/09/2019 28     Protime 02/09/2019 14 1     INR 02/09/2019 1 08     Citrated Platelets 70/52/0690 84*    TSH 3RD GENERATON 02/09/2019 10 200*    Troponin I 02/10/2019 0 31*    Troponin I 02/09/2019 0 32*    PTT 02/09/2019 128*    Sodium 02/10/2019 141     Potassium 02/10/2019 3 6     Chloride 02/10/2019 98*    CO2 02/10/2019 39*    ANION GAP 02/10/2019 4     BUN 02/10/2019 47*    Creatinine 02/10/2019 1 24     Glucose 02/10/2019 96     Calcium 02/10/2019 8 7     eGFR 02/10/2019 54     WBC 02/10/2019 4 29*    RBC 02/10/2019 3 70*    Hemoglobin 02/10/2019 11 1*    Hematocrit 02/10/2019 37 8     MCV 02/10/2019 102*    MCH 02/10/2019 30 0     MCHC 02/10/2019 29 4*    RDW 02/10/2019 16 6*    Platelets 09/08/3338 82*    MPV 02/10/2019 12 9*    Magnesium 02/10/2019 2 3     PTT 02/10/2019 42*                   Invalid input(s): LABAEARO            Imaging: I have personally reviewed pertinent films in PACS  EKG, Pathology, and Other Studies: I have personally reviewed pertinent reports        Code Status: Level 1 - Full Code  Advance Directive and Living Will:      Power of :    POLST:

## 2019-02-10 NOTE — PROGRESS NOTES
Progress Note Venkat Massey 1935, 82 y o  male MRN: [id number]    Unit/Bed#: [facility] 520-01 Encounter: [id number]    Primary Care Provider: Sherri Villalba DO   Date and time admitted to hospital: 2/9/2019  3:02 PM        * Acute on chronic systolic heart failure ([facility] [address] )  Assessment & Plan  · Last echo showed EF 40%  Obtain repeat echo  · Note also low albumin  · Home regimen: Lasix 40 mg daily and metolazone 2 5 mg daily  · IV Lasix  · I&Os, daily weights  · Consult heart failure team  · Not sure why he isn't on BB or ACEI at baseline  Started low dose metoprolol    NSTEMI (non-ST elevated myocardial infarction) ([facility] )  Assessment & Plan  · Suspect type 2 related to volume overload  · D/C heparin drip    Paroxysmal A-fib (HCC)  Assessment & Plan  · With RVR on admission  · Patient not on anticoagulation or AV gui blockers at baseline  · Started low dose beta blocker  · TSH high  Check free T4    Thrombocytopenia (HCC)  Assessment & Plan  · Appears chronic  · Monitor    Chronic respiratory failure with hypoxia (HCC)  Assessment & Plan  · On 2L O2 at baseline    Stage 3 chronic kidney disease (HCC)  Assessment & Plan  · Creatinine at baseline  · Monitor with diuresis    Dysphagia  Assessment & Plan  · Obtain swallow eval  · Consider barium swallow or GI eval if felt to be more of an esophageal issue    Pleural effusion  Assessment & Plan  · F/U final x-ray report  · Suspect due to volume overload but would obtain serial imaging- may need thoracentesis if not improved with diuresis    VTE Pharmacologic Prophylaxis:   Pharmacologic: Heparin  Mechanical VTE Prophylaxis in Place: Yes    Patient Centered Rounds: I have performed bedside rounds with nursing staff today  Discussions with Specialists or Other Care Team Provider:      Education and Discussions with Family / Patient: Patient  Updated nephew, Jung Gardner, by phone  Time Spent for Care: 30 minutes    More than 50% of total time spent on counseling and coordination of care as described above  Current Length of Stay: 1 day(s)    Current Patient Status: Inpatient   Certification Statement: The patient will continue to require additional inpatient hospital stay due to heart failure    Discharge Plan: Likely needs a few days of diuresis  Code Status: Level 1 - Full Code      Subjective:   Feeling well today  He is not having any chest pain  He hasn't really been out of bed so he is unsure if he is having any exertional dyspnea  Objective:     Vitals:   Temp (24hrs), Av 5 °F (36 4 °C), Min:97 4 °F (36 3 °C), Max:97 8 °F (36 6 °C)    Temp:  [97 4 °F (36 3 °C)-97 8 °F (36 6 °C)] 97 5 °F (36 4 °C)  HR:  [] 95  Resp:  [18-24] 18  BP: ()/(60-79) 105/65  SpO2:  [93 %-99 %] 98 %  Body mass index is 26 66 kg/m²  Input and Output Summary (last 24 hours): Intake/Output Summary (Last 24 hours) at 2/10/2019 1413  Last data filed at 2/10/2019 1103  Gross per 24 hour   Intake 534 08 ml   Output 2189 ml   Net -1654 92 ml       Physical Exam:     Physical Exam   Constitutional: No distress  HENT:   Head: Normocephalic and atraumatic  Hard of hearing   Eyes: No scleral icterus  Neck: Neck supple  Cardiovascular:   Murmur heard  Irregular   Pulmonary/Chest: Effort normal    Decreased breath sounds at right lung base   Abdominal: Soft  Bowel sounds are normal  He exhibits no distension  There is no tenderness  There is no guarding  Musculoskeletal: He exhibits edema  Neurological: He is alert  Skin: He is not diaphoretic  Lower extremities with very dry skin   Psychiatric: He has a normal mood and affect         Additional Data:     Labs:    Results from last 7 days   Lab Units 02/10/19  0438 19  1511   WBC Thousand/uL 4 29* 5 17   HEMOGLOBIN g/dL 11 1* 12 1   HEMATOCRIT % 37 8 40 8   PLATELETS Thousands/uL 82*  --    NEUTROS PCT %  --  75   LYMPHS PCT %  --  13*   MONOS PCT %  --  10   EOS PCT %  --  1     Results from last 7 days Lab Units 02/10/19  0438 02/09/19  1511   POTASSIUM mmol/L 3 6 4 7   CHLORIDE mmol/L 98* 99*   CO2 mmol/L 39* 39*   BUN mg/dL 47* 49*   CREATININE mg/dL 1 24 1 32*   CALCIUM mg/dL 8 7 8 7   ALK PHOS U/L  --  105   ALT U/L  --  27   AST U/L  --  42     Results from last 7 days   Lab Units 02/09/19  1558   INR  1 08       * I Have Reviewed All Lab Data Listed Above  * Additional Pertinent Lab Tests Reviewed: All Labs Within Last 24 Hours Reviewed    Imaging:    Imaging Reports Reviewed Today Include: None  Imaging Personally Reviewed by Myself Includes:  None    Recent Cultures (last 7 days):           Last 24 Hours Medication List:     Current Facility-Administered Medications:  acetaminophen 650 mg Oral Q6H PRN Deysi Beatty PA-C   albuterol 2 5 mg Nebulization Q6H PRN Deysi Beatty PA-C   aluminum-magnesium hydroxide-simethicone 30 mL Oral Q4H PRN Deysi Beatty PA-C   aspirin 81 mg Oral Daily Brandyna MELCHOR Beatty   docusate sodium 100 mg Oral BID Brandyna MELCHOR Beatty   finasteride 5 mg Oral Daily Deysi Beatty [place]   [START ON 2/11/2019] furosemide 40 mg Intravenous BID (diuretic) Deysi Beatty PA-C   metolazone 2 5 mg Oral Daily Brandyna MELCHOR Beatty   metoprolol tartrate 12 5 mg Oral Q12H [facility] Deysi Beatty PA-C   ondansetron 4 mg Intravenous Q8H PRN Deysi Beatty PA-C   saccharomyces boulardii 250 mg Oral BID Deysi Beatty PA-C   senna 1 tablet Oral HS Deysi Beatty PA-C        Today, Patient Was Seen By: Deysi Beatty PA-C    ** Please Note: Dragon 360 Dictation voice to text software may have been used in the creation of this document   **

## 2019-02-10 NOTE — ASSESSMENT & PLAN NOTE
· Last echo showed EF 40%  Obtain repeat echo  · Note also low albumin  · Home regimen: Lasix 40 mg daily and metolazone 2 5 mg daily  · IV Lasix  · I&Os, daily weights  · Consult heart failure team  · Not sure why he isn't on BB or ACEI at baseline   Started low dose metoprolol

## 2019-02-11 NOTE — PHYSICAL THERAPY NOTE
Physical Therapy Evaluation     Patient's Name: Jass Gupta    Admitting Diagnosis  Chest pain [R07 9]  Cardiomyopathy ([facility]  ) [I42 9]  Elevated troponin [R74 8]  CHF exacerbation ([facility] ) [I50 9]  Pleural effusion, right [J90]  Atrial flutter by electrocardiogram ([place] [address] ) [I48 92]  Wound of right foot [S91 301A]    Problem List  Patient Active Problem List   Diagnosis    Paroxysmal A-fib ([place] [address] )    Essential hypertension    Acute on chronic systolic heart failure ([facility] )    Cardiomyopathy ([facility] )    Chronic venous insufficiency    Type 2 myocardial infarction ([facility]  )    Pleural effusion, bilateral    Acute kidney injury ([facility]  )    Ambulatory dysfunction    Nodule of kidney    Pleural effusion    NSTEMI (non-ST elevated myocardial infarction) ([place] [address] )    Dysphagia    Stage 3 chronic kidney disease ([facility]  )    Chronic respiratory failure with hypoxia (HCC)    Thrombocytopenia (HCC)       Past Medical History  Past Medical History:   Diagnosis Date    Cardiomyopathy [facility])     with EF of 40 % 12/15    Cellulitis of lower extremity 6/21/2018    Cervical spinal stenosis     Cervical spondylosis     CHF (congestive heart failure) (HCC)     Chronic venous stasis dermatitis of both lower extremities     DDD (degenerative disc disease), lumbar     Degenerative cervical disc     Discitis of lumbar region     Elevated troponin 6/21/2018    History of BPH     Hypertension     Lumbar canal stenosis     OA (osteoarthritis)     Osteomyelitis (HCC)     Paroxysmal A-fib (HCC)     Pneumonia of both lower lobes due to infectious organism ([place] [address] ) 6/21/2018    Spinal stenosis     Urinary retention        Past Surgical History  Past Surgical History:   Procedure Laterality Date    BACK SURGERY      FOOT SURGERY      HIP SURGERY      MT COLONOSCOPY FLX DX W/COLLJ SPEC WHEN PFRMD N/A 11/3/2016    Procedure: COLONOSCOPY;  Surgeon: Izzy Mccullough MD;  [facility];   Service: Gastroenterology    ROTATOR CUFF REPAIR 02/11/19 9815   Note Type   Note type Eval/Treat   Pain Assessment   Pain Assessment 0-10   Pain Score No Pain   Home Living   Type of Home House   Home Layout Two level; Able to live on main level with bedroom/bathroom  (Bilevel home with 1st floor setup and ramp to enter)   Bathroom Shower/Tub Tub/shower unit   [employer] Raised  (Commode over toilet to raise it)   [employer]; Shower chair;Grab bars around toilet   [address] [facility]; Wheelchair-manual;Hospital bed   Prior Function   Level of [place] Needs assistance with IADLs; Needs assistance with ADLs and functional mobility   Lives With Alone   Receives Help From Home health; Family   ADL Assistance Needs assistance   IADLs Needs assistance   Falls in the last 6 months 1 to 4  (1 fall in ED in last admission)   Vocational Retired   Comments PTA, pt required A for ADLs, IADLs, and functional mobility  He receives A from aides in the morning and evening for 30 minutes each (8:30-9am, 7-7:30pm) and his son assist with IADLs PRN  He ambulated short distances with rolling walker, but primarily used manual wheelchair for mobility  He goes to [employer] and enjoys playing cards and participating in the other available activities  Cognition   Overall Cognitive Status Impaired   Arousal/Participation Alert   Orientation Level Oriented X4   Memory Decreased recall of precautions;Decreased recall of recent events   Following Commands Follows one step commands with increased time or repetition   Comments Pt is pleasant and cooperative  He is [place] and wears a hearing aide  RLE Assessment   RLE Assessment X   Strength RLE   RLE Overall Strength 3/5   R Ankle Dorsiflexion 2-/5   R Hip Flexion 3-/5   LLE Assessment   LLE Assessment X   Strength LLE   LLE Overall Strength 3/5   L Hip Flexion 3-/5   L Ankle Dorsiflexion 2-/5   Bed Mobility   Supine to Sit 3  Moderate assistance   Additional items Assist x 1; Increased time required;LE management   Additional Comments Seated in bedside chair at end of session   Transfers   Sit to Stand 3  Moderate assistance   Additional items Assist x 1;Assist x 2; Increased time required  (Mod A x2 progressed to Mod A x1  See treatment note )   Stand to Sit 3  Moderate assistance   Additional items Assist x 1;Assist x 2   Stand pivot 3  Moderate assistance   Additional items Assist x 2; Increased time required;Verbal cues   Ambulation/Elevation   Gait pattern Short stride; Step to;Excessively slow; Forward Flexion;Narrow CANDIE  (Bilateral foot drop compensated with hip hike)   Gait Assistance 3  Moderate assist   Additional items Assist x 2; Tactile cues; Verbal cues   Assistive Device Rolling walker   Distance 3'  (To bedside chair)   Balance   Static Sitting Poor +   Dynamic Sitting Poor +   Static Standing Poor   Dynamic Standing Poor   Ambulatory Poor -   Endurance Deficit   Endurance Deficit Yes   Endurance Deficit Description fatigue, SOB, drop in SpO2 to 80% on 1 L O2 NC   Activity Tolerance   Activity Tolerance Patient limited by fatigue;Treatment limited secondary to medical complications (Comment)  (SOB)   Nurse Made Aware yes, RN cleared pt for eval and raised O2 appropriately with desaturation   Assessment   Prognosis Fair   Problem List Decreased strength;Decreased range of motion;Decreased endurance; Impaired balance;Decreased mobility; Decreased coordination; Impaired judgement;Decreased safety awareness   Assessment Pt is 82 y o  male seen for PT evaluation s/p admit to [facility] on 2/9/2019 w/ Acute on chronic systolic heart failure ([address] )  Pt presented to the ED with chest discomfort and SOB  Pt was recently admitted in January for RICKI and was found to be in rapid Afib in SNF and sent to ED for evaluation  PT consulted to assess pt's functional mobility and d/c needs  Order placed for PT eval and tx, w/ up w/ A order   Comorbidities affecting pt's physical performance at time of assessment include: nodule of kidney, ambulatory dysfunction, HTN, MI type II, thrombocytopenia, chronic respiratory failure with hypoxia, NSTEMI, A-fib, pleural effusion  PTA, pt was independent w/ all functional mobility w/ RW and MWC, ambulates household distances and lives alone in 1st floor setup of bilevel house  Personal factors affecting pt at time of IE include: inaccessible home environment, ambulating w/ assistive device, inability to ambulate household distances, limited home support, positive fall history, inability to perform IADLs, inability to perform ADLs and inability to live alone  Please find objective findings from PT assessment regarding body systems outlined above with impairments and limitations including weakness, impaired balance, decreased endurance, gait deviations, decreased activity tolerance, decreased functional mobility tolerance and fall risk  The following objective measures performed on IE also reveal limitations: Barthel Index: 20/100  Pt's clinical presentation is currently unstable/unpredictable seen in pt's presentation of abnormal labs, telemetry, supplemental oxygen, lines  Pt required increased time with supine to sit transfer and mod A x1 for LE management  At baseline, he demonstrates bilateral foot drop that impacts ambulation and stand pivot transfers  Pt to benefit from continued PT tx to address deficits as defined above and maximize level of functional independent mobility and consistency  From PT/mobility standpoint, recommendation at time of d/c would be STR pending progress in order to facilitate return to PLOF     Barriers to Discharge Decreased caregiver support  (Lives alone)   Goals   Patient Goals To get better   STG Expiration Date 02/21/19   Short Term Goal #1 Pt will demonstrate: 1) increased BLE strength >/= 1 grade for improved transfers 2) supine <> sit transfer with mod I 3) sit <> stand transfer with mod I 4) increased dynamic balance >/= 1 grade to decrease risk for falls 5) Amb >/= 30' with mod I using rolling walker for return to baseline 6) I propel manual wheelchair >/=300' with hand/foot propulsion to return to baseline   Treatment Day 1   Plan   Treatment/Interventions Functional transfer training;LE strengthening/ROM; Therapeutic exercise; Endurance training;Cognitive reorientation;Patient/family training;Equipment eval/education; Bed mobility;Gait training;Spoke to nursing;OT   PT Frequency   (3-5x/wk)   Recommendation   Recommendation Short-term skilled PT  (rehab)   Equipment Recommended Wheelchair;Walker  (owns both)   PT - OK to Discharge Yes   Additional Comments When medically stable   Barthel Index   Feeding 5   Bathing 0   Grooming Score 0   Dressing Score 5   Bladder Score 0   Bowels Score 0   Toilet Use Score 5   Transfers (Bed/Chair) Score 5   Mobility (Level Surface) Score 0   Stairs Score 0   Barthel Index Score 20       PT Treatment  Time In: 1225  Time Out: 1305  Total Time: 40    Pt was agreeable to PT treatment session  An additional sit to stand transfer from bedside chair was performed to exam O2 saturation after RN increased to 1 5 L O2 NC  Upon standing, pt demonstrated bowel incontinence  3 more transfers were performed with cues for sequencing and improvements in lift and safety requiring mod A x2 and progressing to mod A x1  Pt tolerated standing 4 minutes and 2 minutes x2 for hygiene and cleaning  Clean linens and clothing were provided  SpO2 remained >/=84% on 1 5 L O2 NC  Pt would continue to benefit from skilled PT to improve strength, endurance, balance, and mobility to return to PLOF        Edith Park, PT, DPT

## 2019-02-11 NOTE — ASSESSMENT & PLAN NOTE
· Suspect due to volume overload but would obtain serial imaging- may need thoracentesis if not improved with diuresis  · Will check repeat chest x-ray in AM

## 2019-02-11 NOTE — ASSESSMENT & PLAN NOTE
· Speech swallow eval unremarkable    · If symptoms persist, consider GI eval at some point to r/o esophageal issue

## 2019-02-11 NOTE — MEDICAL STUDENT
Heart Failure Consult Note - Ken Cobb 82 y o  male MRN: [id number]    Unit/Bed#: [facility] 520-01 Encounter: [id number]      Assessment:    Principal Problem:    Acute on chronic systolic heart failure (HCC)  Active Problems:    Paroxysmal A-fib (HCC)    Pleural effusion    NSTEMI (non-ST elevated myocardial infarction) (HCC)    Dysphagia    Stage 3 chronic kidney disease (HCC)    Chronic respiratory failure with hypoxia (HCC)    Thrombocytopenia ([address] )      Plan:  1  Acute on Chronic HFrEF LVEF 40%, presumed NICM  - ECHO this admission official read pending,   - NICM due to HTN vs AS vs tachyarrhythmia with PAF  - appears volume overloaded on exam, cont Lasix 40 mg IV BID and Metolazone 2 5 mg QD with goal of improved respiratory status  - cont Metoprolol Tartrate 12 5 mg PO Q12, consider increasing to 25 mg if BP will tolerate  - consider resuming ACE/ARB if BP will tolerate  - hold aggressive work up for now until discussion with family regarding prognosis and overall frailty  2  PAF  - HR trending   - consider increasing Metorprolol Tartrate to 25 mg if BP will tolerate  - currently not anticoagulated, assuming due to fall risk    3  NSTEMI type 2  - Trop peak 0 38  - Likely CHF exacerbation vs Afib driven  - cont ASA 81 mg    4  Acute respiratory failure due to B/L pleural effusions  - cont diuresis, wean O2 for SaO2 >92%    5  CKD  - baseline Cr 1 2-1 3  - stable     --Nutrition consult for CHF diet teaching    HPI:   81 yo male with PMH of NICM LVEF 40% on ECHO 6/2018, Afib not on anticoagulation, was brought to the ED by EMS [facility] due to c/o CP, SOB, and noted increased leg swelling  Pt is managed outpatient by [facility] and was placed at [facility] STR following a recent admission 1/18/19 for RICKI  During that admission patient was rehydrated and his home diuretic regimen was decreased from Lasix 40 mg QD to 20 mg QD    Upon discharge pt was not restarted on Lisinopril 10 mg, presumably due to RICKI, and for unknown reasons was not discharged on Metorprolol Succinate 50 mg BID which he was taking prior to that admission  Upon chart review, patient appears to follow with [facility] cardiology but has not been seen since 2015 where his ECHO at that time showed LVEF 40%, global hypokinesis, concentric LV and septal hypertrophy  Upon evaluation in the ED pt was found to be in Afib with a rate of 107 on ECG, Metoprolol Tartrate 12 5 mg PO Q12 was started  [facility] [place] indicated the patient was taking Lasix 40 mg QD along with Metolazone 2 5 mg QD  CXR revealed B/L pleural effusions R>L with associated bibasilar opacities, pt was started on Lasix 40 mg IV BID along with presumed home dose Metolazone 2 5 mg QD  Heparin gtt initiated in the ED for a Trop 0 38 and concern for ACS, was subsequently d/c'd  Pt is a poor historian and refers to his nephew, who lives in [place], as [facility]  Pt reports prior to admission in January he was able to ambulate short distances at home with his walker without significant dyspnea, syncope, or palpitations, however since he is bed[place]wheelchair bound [place] [facility]  He did admit prior to admission feelings of CP that radiated up into his neck along with exertional angina  He states he could only lay flat for a short period of time due to orthopnea like symptoms       Past Medical History:   Diagnosis Date    Cardiomyopathy [facility])     with EF of 40 % 12/15    Cellulitis of lower extremity 6/21/2018    Cervical spinal stenosis     Cervical spondylosis     CHF (congestive heart failure) (HCC)     Chronic venous stasis dermatitis of both lower extremities     DDD (degenerative disc disease), lumbar     Degenerative cervical disc     Discitis of lumbar region     Elevated troponin 6/21/2018    History of BPH     Hypertension     Lumbar canal stenosis     OA (osteoarthritis)     [facility]     Paroxysmal [facility]-fib ([facility])     Pneumonia of both lower lobes due to infectious organism [facility]) 6/21/2018    Spinal stenosis     Urinary retention        Review of Systems - Pt c/o CP radiating to neck and anginal symptoms with exertion  Denied palpitations, syncope, and presyncope symptoms  Pt states he can only lay flat for short periods of time before experiencing orthopnea  Allergies   Allergen Reactions    Morphine            Current Facility-Administered Medications:     acetaminophen (TYLENOL) tablet 650 mg, 650 mg, Oral, Q6H PRN, Basil Daily, PA-C    albuterol inhalation solution 2 5 mg, 2 5 mg, Nebulization, Q6H PRN, Basil Daily, PA-C    aluminum-magnesium hydroxide-simethicone (MYLANTA) 200-200-20 mg/5 mL oral suspension 30 mL, 30 mL, Oral, Q4H PRN, Basil Daily, PA-C    aspirin chewable tablet 81 mg, 81 mg, Oral, Daily, Basil Daily, PA-C, 81 mg at 02/11/19 0800    docusate sodium (COLACE) capsule 100 mg, 100 mg, Oral, BID, Basil Daily, PA-C, 100 mg at 02/11/19 0800    finasteride (PROSCAR) tablet 5 mg, 5 mg, Oral, Daily, Basil Daily, PA-C, 5 mg at 02/11/19 0800    furosemide (LASIX) injection 40 mg, 40 mg, Intravenous, BID (diuretic), Basil Daily, PA-C, 40 mg at 02/11/19 0758    heparin (porcine) subcutaneous injection 5,000 Units, 5,000 Units, Subcutaneous, Q8H [facility], Basil Daily, PA-C, 5,000 Units at 02/11/19 0542    metolazone (ZAROXOLYN) tablet 2 5 mg, 2 5 mg, Oral, Daily, Basil Daily, PA-C, 2 5 mg at 02/11/19 0803    metoprolol tartrate (LOPRESSOR) partial tablet 12 5 mg, 12 5 mg, Oral, Q12H [facility], Basil Daily, PA-C, 12 5 mg at 02/11/19 0800    ondansetron (ZOFRAN) injection 4 mg, 4 mg, Intravenous, Q8H PRN, Basil Daily, PA-C    saccharomyces boulardii (FLORASTOR) capsule 250 mg, 250 mg, Oral, BID, Basil Daily, PA-C, 250 mg at 02/11/19 0800    senna (SENOKOT) tablet 8 6 mg, 1 tablet, Oral, HS, Basil Daily, PA-C, 8 6 mg at 02/10/19 2114    Social History     Socioeconomic History    Marital status: Single     Spouse name: Not on file    Number of children: Not on file    Years of education: Not on file    Highest education level: Not on file   Occupational History    Not on file   Social Needs    Financial resource strain: Not on file    Food insecurity:     Worry: Not on file     Inability: Not on file    Transportation needs:     Medical: Not on file     Non-medical: Not on file   Tobacco Use    Smoking status: Former Smoker     Packs/day: 3 00     Types: Cigarettes     Last attempt to quit: 1960     Years since quittin 1    Smokeless tobacco: Never Used   Substance and Sexual Activity    Alcohol use: No    Drug use: No    Sexual activity: Not on file   Lifestyle    Physical activity:     Days per week: Not on file     Minutes per session: Not on file    Stress: Not on file   Relationships    Social connections:     Talks on phone: Not on file     Gets together: Not on file     Attends Mu-ism service: Not on file     Active member of club or organization: Not on file     Attends meetings of clubs or organizations: Not on file     Relationship status: Not on file    Intimate partner violence:     Fear of current or ex partner: Not on file     Emotionally abused: Not on file     Physically abused: Not on file     Forced sexual activity: Not on file   Other Topics Concern    Not on file   Social History Narrative    Not on file       History reviewed  No pertinent family history  Physical Exam:  Central Line (day, reason): Chapa catheter (day, reason):    Vitals: Blood pressure 98/68, pulse 92, temperature 97 7 °F (36 5 °C), temperature source Oral, resp  rate 16, height 5' 10" (1 778 m), weight 83 8 kg (184 lb 11 2 oz), SpO2 97 %  , Body mass index is 26 5 kg/m² , I/O last 3 completed shifts:   In: 799 1 [P O :665; I V :134 1]  Out: 3081 [XNEMM:4661]  I/O this shift:  In: -   Out: 346 [Urine:346]  Wt Readings from Last 3 Encounters:   02/11/19 83 8 kg (184 lb 11 2 oz)   01/18/19 86 3 kg (190 lb 3 2 oz)   06/25/18 74 7 kg (164 lb 10 9 oz)       Intake/Output Summary (Last 24 hours) at 2/11/2019 1154  Last data filed at 2/11/2019 1014  Gross per 24 hour   Intake 285 ml   Output 1238 ml   Net -953 ml     I/O last 3 completed shifts: In: 799 1 [P O :665; I V :134 1]  Out: 2444 [Urine:3081]    No significant arrhythmias seen on telemetry review  Sinus Tachycardia with multifocal PVC's      Vitals:    02/11/19 0146 02/11/19 0600 02/11/19 0700 02/11/19 1050   BP: 121/79  117/79 98/68   BP Location:       Pulse: 100  96 92   Resp: 18 18 16   Temp: 98 3 °F (36 8 °C)  (!) 97 4 °F (36 3 °C) 97 7 °F (36 5 °C)   TempSrc:   Oral Oral   SpO2: 97%  97% 97%   Weight:  83 8 kg (184 lb 11 2 oz)     Height:           GEN: Michael Alexander appears well, alert and oriented x 3, pleasant and cooperative   HEENT: pupils equal, round, and reactive to light; extraocular muscles intact  NECK: supple, no carotid bruits   HEART: regular rhythm, normal S1 and S2, no murmurs, clicks, gallops or rubs, JVP is mildly elevated  LUNGS: coarse to ausculation b/l, no wheeze or rhonchi  ABDOMEN: normal bowel sounds, soft, no tenderness, no distention  EXTREMITIES: peripheral pulses normal; no clubbing, cyanosis, or edema  NEURO: no focal findings   SKIN: normal without suspicious lesions on exposed skin    Labs & Results:    Results from last 7 days   Lab Units 02/10/19  0438 02/09/19  2222 02/09/19  1511   TROPONIN I ng/mL 0 31* 0 32* 0 28*     Results from last 7 days   Lab Units 02/11/19  0431 02/10/19  0438 02/09/19  1511   WBC Thousand/uL 4 39 4 29* 5 17   HEMOGLOBIN g/dL 11 0* 11 1* 12 1   HEMATOCRIT % 36 8 37 8 40 8   PLATELETS Thousands/uL  --  82*  --          Results from last 7 days   Lab Units 02/11/19  0431 02/10/19  0438 02/09/19  1511   POTASSIUM mmol/L 3 9 3 6 4 7   CHLORIDE mmol/L 98* 98* 99*   CO2 mmol/L 38* 39* 39*   BUN mg/dL 51* 47* 49* CREATININE mg/dL 1 27 1 24 1 32*   CALCIUM mg/dL 8 5 8 7 8 7   ALK PHOS U/L  --   --  105   ALT U/L  --   --  27   AST U/L  --   --  42     Results from last 7 days   Lab Units 02/09/19  1558   INR  1 08       Chest X-Ray is obtained; result - R greater than L pleural effusions and associated bibasilar opacities    TTE from this admission report pending  Preliminary findings demonstrate likely preserved LV function by visual assessment  Discussed with attending       TTE 2018:  LVEF: 40%  LVIDd: 5 2 cm  RV:size at upper limits of normal, systolic function low normal   MR:mild to moderate  PASP:55-60 mmHg  RVOT:   Other:Mild diffuse hypokinesis, wall thickness increased, concentric hypertrophy        EKG personally reviewed

## 2019-02-11 NOTE — PROGRESS NOTES
Progress Note Jacky Stout 1935, 82 y o  male MRN: [id number]    Unit/Bed#: [facility] 520-01 Encounter: [id number]    Primary Care Provider: Armani Arauz DO   Date and time admitted to hospital: 2/9/2019  3:02 PM        * Acute on chronic systolic heart failure ([facility] )  Assessment & Plan  · Last echo showed EF 40%  · Repeat echo pending  · Note also low albumin  · Home regimen: Lasix 40 mg daily and metolazone 2 5 mg daily  · IV Lasix  · I&Os, daily weights  · Heart failure team following  · Not sure why he isn't on BB or ACEI at baseline  Started low dose metoprolol    NSTEMI (non-ST elevated myocardial infarction) ([facility] )  Assessment & Plan  · Suspect type 2 related to volume overload  · Initially placed on heparin drip which has since been discontinued    Paroxysmal A-fib ([facility] )  Assessment & Plan  · With RVR on admission  · Patient not on anticoagulation or AV gui blockers at baseline  · Started low dose beta blocker  · TSH high  Free T4 within normal limits- repeat labs in 6 weeks    Thrombocytopenia (HCC)  Assessment & Plan  · Appears chronic  · Monitor    Chronic respiratory failure with hypoxia (HCC)  Assessment & Plan  · On 2L O2 at baseline    Stage 3 chronic kidney disease (HCC)  Assessment & Plan  · Creatinine at baseline  · Monitor with diuresis    Dysphagia  Assessment & Plan  · Speech swallow eval unremarkable  · If symptoms persist, consider GI eval at some point to r/o esophageal issue    Pleural effusion  Assessment & Plan  · Suspect due to volume overload but would obtain serial imaging- may need thoracentesis if not improved with diuresis  · Will check repeat chest x-ray in AM      VTE Pharmacologic Prophylaxis:   Pharmacologic: Heparin  Mechanical VTE Prophylaxis in Place: Yes    Patient Centered Rounds: I have performed bedside rounds with nursing staff today      Discussions with Specialists or Other Care Team Provider: Present in room with cardiology team     Education and Discussions with Family / Patient: Patient  Updated nephew, Buck Mcginnis, by phone  Time Spent for Care: 30 minutes  More than 50% of total time spent on counseling and coordination of care as described above  Current Length of Stay: 2 day(s)    Current Patient Status: Inpatient   Certification Statement: The patient will continue to require additional inpatient hospital stay due to heart failure    Discharge Plan: When closer to euvolemic  Likely needs at least 24-48 hours  Code Status: Level 1 - Full Code      Subjective:   Feels well  Thinks his breathing is improved  No overnight events  Objective:     Vitals:   Temp (24hrs), Av 9 °F (36 6 °C), Min:97 4 °F (36 3 °C), Max:98 5 °F (36 9 °C)    Temp:  [97 4 °F (36 3 °C)-98 5 °F (36 9 °C)] 97 7 °F (36 5 °C)  HR:  [] 92  Resp:  [16-18] 16  BP: ()/(68-79) 98/68  SpO2:  [94 %-99 %] 97 %  Body mass index is 26 5 kg/m²  Input and Output Summary (last 24 hours): Intake/Output Summary (Last 24 hours) at 2019 1324  Last data filed at 2019 1014  Gross per 24 hour   Intake 285 ml   Output 1238 ml   Net -953 ml       Physical Exam:     Physical Exam   Constitutional: He is oriented to person, place, and time  No distress  HENT:   Hard of hearing   Eyes: No scleral icterus  Neck: Neck supple  Cardiovascular: Normal rate and regular rhythm  Pulmonary/Chest: Effort normal  No respiratory distress  Crackles at lung bases   Abdominal: Soft  Bowel sounds are normal    Musculoskeletal: He exhibits edema  Neurological: He is alert and oriented to person, place, and time  Skin: He is not diaphoretic  Dry skin lower extremities   Psychiatric: He has a normal mood and affect         Additional Data:     Labs:    Results from last 7 days   Lab Units 19  0431 02/10/19  0438 19  1511   WBC Thousand/uL 4 39 4 29* 5 17   HEMOGLOBIN g/dL 11 0* 11 1* 12 1   HEMATOCRIT % 36 8 37 8 40 8   PLATELETS Thousands/uL  --  82*  -- NEUTROS PCT %  --   --  75   LYMPHS PCT %  --   --  13*   MONOS PCT %  --   --  10   EOS PCT %  --   --  1     Results from last 7 days   Lab Units 02/11/19  0431  02/09/19  1511   POTASSIUM mmol/L 3 9   < > 4 7   CHLORIDE mmol/L 98*   < > 99*   CO2 mmol/L 38*   < > 39*   BUN mg/dL 51*   < > 49*   CREATININE mg/dL 1 27   < > 1 32*   CALCIUM mg/dL 8 5   < > 8 7   ALK PHOS U/L  --   --  105   ALT U/L  --   --  27   AST U/L  --   --  42    < > = values in this interval not displayed  Results from last 7 days   Lab Units 02/09/19  1558   INR  1 08       * I Have Reviewed All Lab Data Listed Above  * Additional Pertinent Lab Tests Reviewed: All Labs Within Last 24 Hours Reviewed    Imaging:    Imaging Reports Reviewed Today Include: None  Imaging Personally Reviewed by Myself Includes:  None    Recent Cultures (last 7 days):           Last 24 Hours Medication List:     Current Facility-Administered Medications:  acetaminophen 650 mg Oral Q6H PRN MAYELA Gonsalez-C   albuterol 2 5 mg Nebulization Q6H PRN Kevan Marin, PA-C   aluminum-magnesium hydroxide-simethicone 30 mL Oral Q4H PRN MAYELA Gonsalez-C   aspirin 81 mg Oral Daily Kevan Marin PA-C   docusate sodium 100 mg Oral BID Kevan Tomas, PA-C   finasteride 5 mg Oral Daily Osprey Tomas, PA-C   furosemide 40 mg Intravenous BID (diuretic) MAYELA Gonsalez-C   heparin (porcine) 5,000 Units Subcutaneous [facility] MAYELA Gonsalez-C   metolazone 2 5 mg Oral Daily Kevan Tomas, PA-C   metoprolol succinate 12 5 mg Oral BID ELEN Manrique   ondansetron 4 mg Intravenous Q8H PRN MAYELA Gonsalez-JAMIE   saccharomyces boulardii 250 mg Oral BID Kevanquyen Marin, PA-C   senna 1 tablet Oral HS DEVON GonsalezC        Today, Patient Was Seen By: Kevan Marin PA-C    ** Please Note: Dragon 360 Dictation voice to text software may have been used in the creation of this document  **

## 2019-02-11 NOTE — ASSESSMENT & PLAN NOTE
· With RVR on admission  · Patient not on anticoagulation or AV gui blockers at baseline  · Started low dose beta blocker  · TSH high   Free T4 within normal limits- repeat labs in 6 weeks

## 2019-02-11 NOTE — PLAN OF CARE
Problem: OCCUPATIONAL THERAPY ADULT  Goal: Performs self-care activities at highest level of function for planned discharge setting  See evaluation for individualized goals  Description  Treatment Interventions: ADL retraining, Functional transfer training, Endurance training, Cognitive reorientation, Patient/family training, Equipment evaluation/education, Compensatory technique education, Continued evaluation, Energy conservation, Activityengagement          See flowsheet documentation for full assessment, interventions and recommendations      Outcome: Progressing

## 2019-02-11 NOTE — CONSULTS
Heart Failure Consult Note - Ken Cobb 82 y o  male MRN: [id number]    Unit/Bed#: [facility] 520-01 Encounter: [id number]      Assessment:    Principal Problem:    Acute on chronic systolic heart failure (HCC)  Active Problems:    Paroxysmal A-fib (HCC)    Pleural effusion    NSTEMI (non-ST elevated myocardial infarction) (HCC)    Dysphagia    Stage 3 chronic kidney disease (HCC)    Chronic respiratory failure with hypoxia (HCC)    Thrombocytopenia ([address] )      Plan: 1  Acute on chronic combined systolic and diastolic CHF, HFpEF, LVIDd 5 2 cm, NYHA Class III/IV symptoms, ACC/AHA stage C  LVEF per TTE this admission actually appears to have improved from last study in June of 2018 and  estimated at 50%  by visual assessment, full report pending  Etiology unclear, but likely NICM and 2/2 longstanding HTN, possible tachy-mediated in the setting of PAF, +/- possible infiltrative cause, +/- ischemia, +/-valvular in the setting of mild to moderate AS  Clinically, patient still appears mildly volume overloaded on exam but warm and well perfused  Weight is down  Creat is stable at 1  3  We will continue with IV Lasix and Metolazone for now and reasess volume status in the AM  Patient was started on beta blocker this admission  Will switch Metoprolol tartrate to succinate and start Spironolactone prior to discharge if renal function allows  Continue to monitor daily weights, strict I's and O's, BMP  Per patient's request,  we will proceed conservatively until family is present to discuss [address]  Patient is also under the care of [facility] and ultimately most of patient's care will be deferred to them upon discharge  2 HTN  Generally well controlled on current regimen  3 NSTEMI  Troponin peaking at 0 32  Likely 2/2 to #1  No anginal type symptoms at present and no acute EKG changes noted  4  PAF  Currently in atrial fibrillation with controlled ventricular rate  Continue on Metoprolol   Will likely start patient on oral AC prior to discharge although may be at increased fall risk  CHADS VAS score of 6  Will discuss with attending  5  Mild to Moderate AS  6 CKD  Baseline creat 1 2-1 3        HPI: This is a 82year old male with PMH significant for presumed NICM with an EF of 40% by most recent TTE, HTN, PAF, not currently on oral AC, CKD with baseline creatinine of what appears to be 1 2-1 3, history of CVA, and  venous insufficiency who presented to the [address] [place] [address] on 2/9/19 with complaints of worsening shortness of breath and lower extremity edema  Unfortunately patient is unable to provide details of his cardiac history and most of the information is obtained from prior notes/records  It appears patient had once followed with [facility] cardiology but has not been seen by them since December of 2015  According to the last note, patient has a history of NICM with supposed normalization of his EF in the past  It is unclear what the underlying etiology of his myopathy is based on the records  Patient was most recently admitted here in January for RICKI after which he was [place] [facility]  Prior to that had been living at home alone and had one other admission to our facility in June of 2018  for acute on chronic heart failure decompensation  States since admission here in January, has become progressively more weak  Notes worsening shortness of breath with exertion, difficulty lying flat at times,  no typical chest pain, palpitations, or syncopal episodes  On admit, patient was noted to have a Pro BNP of 15, 000, was just over 6000 in June of last year  Troponins peaked at 0 32  Chest Xray demonstrated bilateral pleural effusions  Was noted to be in an atrial fibrillation with occasional PVCs  Was started on IV Lasix 40 mg IV BID  Appears his home dose of diuretic was Lasix 40 mg PO with Metolazone 2 5 mg PO daily  Was not on any other GDMT other than diuretic on admission   Of note, it appears his weight upon discharge in June of 2018 was 164 lbs  At discharge here in January was 190 lbs  On admit two days ago, 193 lbs  Past Medical History:   Diagnosis Date    Cardiomyopathy [facility])     with EF of 40 % 12/15    Cellulitis of lower extremity 6/21/2018    Cervical spinal stenosis     Cervical spondylosis     [facility]congest[facility]     Chronic venous stasis dermatitis of both lower extremities     DDD (degenerative disc disease), lumbar     Degenerative cervical disc     Discitis of lumbar region     Elevated troponin 6/21/2018    History of BPH     Hypertension     Lumbar canal stenosis     OA (osteoarthritis)     Osteomyelitis (HCC)     Paroxysmal A-fib (HCC)     Pneumonia of both lower lobes due to infectious organism ([facility] ) 6/21/2018    Spinal stenosis     Urinary retention        12 point ROS negative other than that stated in HPI    Allergies   Allergen Reactions    Morphine            Current Facility-Administered Medications:     acetaminophen (TYLENOL) tablet 650 mg, 650 mg, Oral, Q6H PRN, Mike Simmons, PA-C    albuterol inhalation solution 2 5 mg, 2 5 mg, Nebulization, Q6H PRN, Mike Simmons, PA-C    aluminum-magnesium hydroxide-simethicone (MYLANTA) 200-200-20 mg/5 mL oral suspension 30 mL, 30 mL, Oral, Q4H PRN, Mike Simmons, PA-C    aspirin chewable tablet 81 mg, 81 mg, Oral, Daily, Mike Gutierreza, PA-C, 81 mg at 02/11/19 0800    docusate sodium (COLACE) capsule 100 mg, 100 mg, Oral, BID, Mike Simmons, PA-C, 100 mg at 02/11/19 0800    finasteride (PROSCAR) tablet 5 mg, 5 mg, Oral, Daily, Mike Simmons, PA-C, 5 mg at 02/11/19 0800    furosemide (LASIX) injection 40 mg, 40 mg, Intravenous, BID (diuretic), Mike Simmons, PA-C, 40 mg at 02/11/19 0758    heparin (porcine) subcutaneous injection 5,000 Units, 5,000 Units, Subcutaneous, Q8H [address], Mike Simmons, PA-C, 5,000 Units at 02/11/19 0542   metolazone (ZAROXOLYN) tablet 2 5 mg, 2 5 mg, Oral, Daily, Deepika Chatters, PA-C, 2 5 mg at 19 0803    metoprolol tartrate (LOPRESSOR) partial tablet 12 5 mg, 12 5 mg, Oral, Q12H [facility], Deepika Chatters, PA-C, 12 5 mg at 19 0800    ondansetron (ZOFRAN) injection 4 mg, 4 mg, Intravenous, Q8H PRN, Deepika Chatters, PA-C    saccharomyces boulardii (FLORASTOR) capsule 250 mg, 250 mg, Oral, BID, Deepika Chatters, PA-C, 250 mg at 19 0800    senna (SENOKOT) tablet 8 6 mg, 1 tablet, Oral, HS, Deepika Chatters, PA-C, 8 6 mg at 02/10/19 211    Social History     Socioeconomic History    Marital status: Single     Spouse name: Not on file    Number of children: Not on file    Years of education: Not on file    Highest education level: Not on file   Occupational History    Not on file   Social Needs    Financial resource strain: Not on file    Food insecurity:     Worry: Not on file     Inability: Not on file    Transportation needs:     Medical: Not on file     Non-medical: Not on file   Tobacco Use    Smoking status: Former Smoker     Packs/day: 3 00     Types: Cigarettes     Last attempt to quit: 1960     Years since quittin 1    Smokeless tobacco: Never Used   Substance and Sexual Activity    Alcohol use: No    Drug use: No    Sexual activity: Not on file   Lifestyle    Physical activity:     Days per week: Not on file     Minutes per session: Not on file    Stress: Not on file   Relationships    Social connections:     Talks on phone: Not on file     Gets together: Not on file     Attends Anabaptism service: Not on file     Active member of club or organization: Not on file     Attends meetings of clubs or organizations: Not on file     Relationship status: Not on file    Intimate partner violence:     Fear of current or ex partner: Not on file     Emotionally abused: Not on file     Physically abused: Not on file     Forced sexual activity: Not on file   Other Topics Concern    Not on file   Social History Narrative    Not on file       History reviewed  No pertinent family history  Physical Exam:  Central Line (day, reason): Chapa catheter (day, reason):    Vitals: Blood pressure 117/79, pulse 96, temperature (!) 97 4 °F (36 3 °C), temperature source Oral, resp  rate 18, height 5' 10" (1 778 m), weight 83 8 kg (184 lb 11 2 oz), SpO2 97 %  , Body mass index is 26 5 kg/m² , I/O last 3 completed shifts: In: 799 1 [P O :665; I V :134 1]  Out: 3081 [XJLUX:8772]  I/O this shift:  In: -   Out: 213 [Urine:213]  Wt Readings from Last 3 Encounters:   02/11/19 83 8 kg (184 lb 11 2 oz)   01/18/19 86 3 kg (190 lb 3 2 oz)   06/25/18 74 7 kg (164 lb 10 9 oz)       Intake/Output Summary (Last 24 hours) at 2/11/2019 1008  Last data filed at 2/11/2019 0759  Gross per 24 hour   Intake 285 ml   Output 1249 ml   Net -964 ml     I/O last 3 completed shifts: In: 799 1 [P O :665; I V :134 1]  Out: 3081 [Urine:3081]    Atrial fib with HR  bpm       Vitals:    02/10/19 2249 02/11/19 0146 02/11/19 0600 02/11/19 0700   BP: 109/69 121/79  117/79   BP Location:       Pulse: 99 100  96   Resp: 18 18  18   Temp: 98 5 °F (36 9 °C) 98 3 °F (36 8 °C)  (!) 97 4 °F (36 3 °C)   TempSrc:    Oral   SpO2: 94% 97%  97%   Weight:   83 8 kg (184 lb 11 2 oz)    Height:           GEN: Michael Alexander appears well, alert and oriented x 3, pleasant and cooperative   HEENT: pupils equal, round, and reactive to light; extraocular muscles intact  NECK: supple, no carotid bruits   HEART: regular rhythm, normal S1 and S2, no murmurs, clicks, gallops or rubs, JVP is elevated     LUNGS: coarse to auscultation bilaterally; no wheezes, + bibasilar rales, no rhonchi   ABDOMEN: normal bowel sounds, soft, no tenderness, no distention  EXTREMITIES: peripheral pulses normal; no clubbing, cyanosis, trace-+1edema  NEURO: no focal findings   SKIN: normal without suspicious lesions on exposed skin    Labs & Results:    Results from last 7 days   Lab Units 02/10/19  0438 02/09/19  2222 02/09/19  1511   TROPONIN I ng/mL 0 31* 0 32* 0 28*     Results from last 7 days   Lab Units 02/11/19  0431 02/10/19  0438 02/09/19  1511   WBC Thousand/uL 4 39 4 29* 5 17   HEMOGLOBIN g/dL 11 0* 11 1* 12 1   HEMATOCRIT % 36 8 37 8 40 8   PLATELETS Thousands/uL  --  82*  --          Results from last 7 days   Lab Units 02/11/19  0431 02/10/19  0438 02/09/19  1511   POTASSIUM mmol/L 3 9 3 6 4 7   CHLORIDE mmol/L 98* 98* 99*   CO2 mmol/L 38* 39* 39*   BUN mg/dL 51* 47* 49*   CREATININE mg/dL 1 27 1 24 1 32*   CALCIUM mg/dL 8 5 8 7 8 7   ALK PHOS U/L  --   --  105   ALT U/L  --   --  27   AST U/L  --   --  42     Results from last 7 days   Lab Units 02/09/19  1558   INR  1 08       Chest X-Ray is obtained; result -reviewed  TTE from this admission report pending  Preliminary findings demonstrate likely preserved LV function by visual assessment  Discussed with attending  TTE 2018:  LVEF: 40%  LVIDd: 5 2 cm  RV:size at upper limits of normal, systolic function low normal   MR:mild to moderate  PASP:55-60 mmHg  RVOT:   Other:Mild diffuse hypokinesis, wall thickness increased, concentric hypertrophy  EKG personally reviewed by ELEN Prajapati  Counseling / Coordination of Care  Total floor / unit time spent today 40 minutes  Greater than 50% of total time was spent with the patient and / or family counseling and / or coordination of care  A description of the counseling / coordination of care: 20  Thank you for the opportunity to participate in the care of this patient

## 2019-02-11 NOTE — PLAN OF CARE
Problem: OCCUPATIONAL THERAPY ADULT  Goal: Performs self-care activities at highest level of function for planned discharge setting  See evaluation for individualized goals  Description  Treatment Interventions: ADL retraining, Functional transfer training, Endurance training, Cognitive reorientation, Patient/family training, Equipment evaluation/education, Compensatory technique education, Continued evaluation, Energy conservation, Activityengagement          See flowsheet documentation for full assessment, interventions and recommendations  2/11/2019 1530 by Farhan Wright OT  Outcome: Progressing  Note:   Limitation: Decreased ADL status, Decreased Safe judgement during ADL, Decreased cognition, Decreased endurance, Decreased self-care trans, Decreased high-level ADLs  Prognosis: Fair  Assessment: Pt is a 83 YO  Male admitted to [facility] on 2/9/19 w/ acute on chronic systolic heart failure  Comorbidities include a h/o RICKI, Afib, HTN, MI type II, chronic respiratory failure, NSTEMI, pleural effusion, and nodule of kidney    Pt with active OT orders and up with assistance  order   Pt resides alone in an apt  PTA, pt required A for ADLs, IADLs, and functional mobility  He receives A from aides in the morning and evening for 30 minutes each (8:30-9am, 7-7:30pm) and his son assist with IADLs PRN  Currently pt is Max A for LB ADLs, Mod A for transfers and functional mobility, and total assist for toileting  Pt is limited at this time 2*: endurance, activity tolerance, functional mobility, balance, functional standing tolerance, unsupportive home environment, decreased I w/ ADLS/IADLS, cognitive impairments, decreased safety awareness and decreased insight into deficits  The following Occupational Performance Areas to address include: grooming, bathing/shower, toilet hygiene, dressing, medication management, health maintenance, functional mobility, community mobility, clothing management and household maintenance  Pt scored overall  20/100 on the Barthel Index  Based on the aforementioned OT evaluation, functional performance deficits, and assessments, pt has been identified as a high complexity evaluation  From OT standpoint, anticipate d/c STR  Pt to continue to benefit from acute immediate OT services to address the following goals 3-5x/week to  w/in 10-14 days:        OT Discharge Recommendation: Short Term Rehab  OT - OK to Discharge: Yes    2019 1529 by Silvana Lehman OT  Outcome: Progressing

## 2019-02-11 NOTE — PLAN OF CARE
Problem: PHYSICAL THERAPY ADULT  Goal: Performs mobility at highest level of function for planned discharge setting  See evaluation for individualized goals  Description  Treatment/Interventions: Functional transfer training, LE strengthening/ROM, Therapeutic exercise, Endurance training, Cognitive reorientation, Patient/family training, Equipment eval/education, Bed mobility, Gait training, Spoke to nursing, OT  Equipment Recommended: Wheelchair, Walker(owns both)       See flowsheet documentation for full assessment, interventions and recommendations  Note:   Prognosis: Fair  Problem List: Decreased strength, Decreased range of motion, Decreased endurance, Impaired balance, Decreased mobility, Decreased coordination, Impaired judgement, Decreased safety awareness  Assessment: Pt is 82 y o  male seen for PT evaluation s/p admit to [facility] on 2/9/2019 w/ Acute on chronic systolic heart failure ([facility] [address] )  Pt presented to the ED with chest discomfort and SOB  Pt was recently admitted in January for RICKI and was found to be in rapid Afib in SNF and sent to ED for evaluation  PT consulted to assess pt's functional mobility and d/c needs  Order placed for PT eval and tx, w/ up w/ A order  Comorbidities affecting pt's physical performance at time of assessment include: nodule of kidney, ambulatory dysfunction, HTN, MI type II, thrombocytopenia, chronic respiratory failure with hypoxia, NSTEMI, A-fib, pleural effusion  PTA, pt was independent w/ all functional mobility w/ RW and MWC, ambulates household distances and lives alone in 1st floor setup of bilevel house  Personal factors affecting pt at time of IE include: inaccessible home environment, ambulating w/ assistive device, inability to ambulate household distances, limited home support, positive fall history, inability to perform IADLs, inability to perform ADLs and inability to live alone   Please find objective findings from PT assessment regarding body systems outlined above with impairments and limitations including weakness, impaired balance, decreased endurance, gait deviations, decreased activity tolerance, decreased functional mobility tolerance and fall risk  The following objective measures performed on IE also reveal limitations: Barthel Index: 20/100  Pt's clinical presentation is currently unstable/unpredictable seen in pt's presentation of abnormal labs, telemetry, supplemental oxygen, lines  Pt to benefit from continued PT tx to address deficits as defined above and maximize level of functional independent mobility and consistency  From PT/mobility standpoint, recommendation at time of d/c would be STR pending progress in order to facilitate return to PLOF  Barriers to Discharge: Decreased caregiver support(Lives alone)     Recommendation: Short-term skilled PT(rehab)     PT - OK to Discharge: Yes    See flowsheet documentation for full assessment

## 2019-02-11 NOTE — ASSESSMENT & PLAN NOTE
· Suspect type 2 related to volume overload  · Initially placed on heparin drip which has since been discontinued

## 2019-02-11 NOTE — OCCUPATIONAL THERAPY NOTE
[address]  Evaluation     Patient Name: Arti Guardado  IRWCH'Q Date: 2/11/2019  Problem List  Patient Active Problem List   Diagnosis    Paroxysmal A-fib ([address] )    Essential hypertension    Acute on chronic systolic heart failure (HCC)    Cardiomyopathy ([address] )    Chronic venous insufficiency    Type 2 myocardial infarction ([address] )    Pleural effusion, bilateral    Acute kidney injury ([address] )    Ambulatory dysfunction    Nodule of kidney    Pleural effusion    NSTEMI (non-ST elevated myocardial infarction) ([address] )    Dysphagia    Stage 3 chronic kidney disease ([address] )    Chronic respiratory failure with hypoxia (HCC)    Thrombocytopenia ([facility])     Past Medical History  Past Medical History:   Diagnosis Date    Cardiomyopathy [facility])     with EF of 40 % 12/15    Cellulitis of lower extremity 6/21/2018    Cervical spinal stenosis     Cervical spondylosis     CH[facility]congestive heart failure) ([facility])     Chronic venous stasis dermatitis of both lower extremities     DDD (degenerative disc disease), lumbar     Degenerative cervical disc     Discitis of lumbar region     Elevated troponin 6/21/2018    History of BPH     Hypertension     Lumbar canal stenosis     OA (osteoarthritis)     Osteomyelitis (HCC)     Paroxysmal A-fib (HCC)     Pneumonia of both lower lobes due to infectious organism ([address] ) 6/21/2018    Spinal stenosis     Urinary retention      Past Surgical History  Past Surgical History:   Procedure Laterality Date    BACK SURGERY      FOOT SURGERY      HIP SURGERY      W[place] COLONOSCOPY FLX DX W/COLLJ SPEC WHEN PFRMD N/A 11/3/2016    Procedure: COLONOSCOPY;  Surgeon: Gilberto Corbett MD;  Location: [facility]; Service: Gastroenterology    ROTATOR CUFF REPAIR           02/11/19 1307   Note Type   Note type Eval/Treat   Restrictions/Precautions   Weight Bearing Precautions Per Order No   Other Precautions Chair Alarm;Cognitive; Bed Alarm; Fall Risk;O2   Pain Assessment   Pain Assessment No/denies pain   Pain Score No Pain   Home Living   Type of Home House   Home Layout Two level; Able to live on main level with bedroom/bathroom   Bathroom Shower/Tub Tub/shower unit   Bathroom Toilet Raised   Bathroom Equipment Commode; Shower chair;Grab bars around toilet   [address] [facility]; Wheelchair-manual;Hospital bed   Prior Function   Level of [place] Needs assistance with IADLs; Needs assistance with ADLs and functional mobility   Lives With Alone   Receives Help From Home health; Family   ADL Assistance Needs assistance   IADLs Needs assistance   Falls in the last 6 months 1 to 4   Vocational Retired   Lifestyle   [employer] PTA, pt required A for ADLs, IADLs, and functional mobility  He receives A from aides in the morning and evening for 30 minutes each (8:30-9am, 7-7:30pm) and his son assist with IADLs PRN   Reciprocal Relationships supportive son   Service to Others retired   [address] enjoys playing cards at [employer] ([address]) [employer] "I didn't feel like I was going"   ADL   Where Assessed Chair   Eating Assistance 5  Supervision/Setup   Grooming Assistance 4  Minimal Assistance   UB [address] 3  Moderate Assistance   LB [address] 2  Maximal [address] 3  Moderate Assistance   [address]; Thread LUE; Increased time to complete  (don/doff gown)   LB Dressing Assistance 2  Maximal Assistance   LB Dressing Deficit Don/doff R sock; Don/doff L sock   Toileting Assistance  1  Total Assistance   Toileting Deficit Perineal hygiene   Bed Mobility   Supine to Sit 3  Moderate assistance   Additional items Assist x 1; Increased time required   Transfers   Sit to Stand 3  Moderate assistance   Additional items Assist x 2; Increased time required  (progressed to Mod A x 1 on fourth transfer)   Stand to Sit 3  Moderate assistance   Additional items Assist x 2; Increased time required  (progressed to Mod A x 1 on fourth transfer)   Stand pivot 3  Moderate assistance   Additional items Assist x 2; Increased time required   Balance   Static Sitting Poor +   Dynamic Sitting Poor +   Static Standing Poor   Dynamic Standing Poor   Ambulatory Poor -   Activity Tolerance   Activity Tolerance Treatment limited secondary to medical complications (Comment)  (SOB)   Medical Staff Made Aware PT Cheryl Comerjennifer   Nurse Made Aware okay to see per RN   RUE Assessment   RUE Assessment WFL   LUE Assessment   LUE Assessment WFL   Hand Function   Gross Motor Coordination Functional   Fine Motor Coordination Functional   Cognition   Overall Cognitive Status Impaired   Arousal/Participation Cooperative   Attention Attends with cues to redirect   Orientation Level Oriented X4   Memory Decreased recall of precautions;Decreased recall of recent events   Following Commands Follows one step commands with increased time or repetition   Comments [place]   Assessment   Limitation Decreased ADL status; Decreased Safe judgement during ADL;Decreased cognition;Decreased endurance;Decreased self-care trans;Decreased high-level ADLs   Prognosis Fair   Assessment Pt is a 83 YO  Male admitted to [facility] on 2/9/19 w/ acute on chronic systolic heart failure  Comorbidities include a h/o RICKI, Afib, HTN, MI type II, chronic respiratory failure, NSTEMI, pleural effusion, and nodule of kidney    Pt with active OT orders and up with assistance  order   Pt resides alone in an apt  PTA, pt required A for ADLs, IADLs, and functional mobility  He receives A from aides in the morning and evening for 30 minutes each (8:30-9am, 7-7:30pm) and his son assist with IADLs PRN  Currently pt is Max A for LB ADLs, Mod A for transfers and functional mobility, and total assist for toileting   Pt is limited at this time 2*: endurance, activity tolerance, functional mobility, balance, functional standing tolerance, unsupportive home environment, decreased I w/ ADLS/IADLS, cognitive impairments, decreased safety awareness and decreased insight into deficits  The following Occupational Performance Areas to address include: grooming, bathing/shower, toilet hygiene, dressing, medication management, health maintenance, functional mobility, community mobility, clothing management and household maintenance  Pt scored overall  20/100 on the Barthel Index  Based on the aforementioned OT evaluation, functional performance deficits, and assessments, pt has been identified as a high complexity evaluation  From OT standpoint, anticipate d/c STR  Pt to continue to benefit from acute immediate OT services to address the following goals 3-5x/week to  w/in 10-14 days: Anderson Hernandez Goals   Patient Goals to get better   LTG Time Frame 10-   Plan   Treatment Interventions ADL retraining;Functional transfer training; Endurance training;Cognitive reorientation;Patient/family training;Equipment evaluation/education; Compensatory technique education;Continued evaluation; Energy conservation; Activityengagement   Goal Expiration Date 19   Treatment Day 1   OT Frequency 3-5x/wk   Additional Treatment Session   Start Time 1229   End Time 0693   Treatment Assessment Pt seen for extended tx session due to soiled pads and incontinence of bowels when standing  Pt required total assist for perineal hygiene and required 3 additional sit <>stands as pt could not tell when he was having BM and became incontinent several times  By the third trial pt able to stand w/ Mod A x 1  Pt Max A for LB dressing and required change of socks due to incontience of urine  Pt then required Min A for don/doff gown due to incontience of bowels  Pt is limited 2* decreased activity tolerance, decreased ADL status, decreased functional mobility, decreased cognition, and limited home support  Continue to recommend STR  OT will continue to follow to see as able     Recommendation   OT Discharge Recommendation Short Term Rehab OT - OK to Discharge Yes   Barthel Index   Feeding 5   Bathing 0   Grooming Score 0   Dressing Score 5   Bladder Score 0   Bowels Score 0   Toilet Use Score 5   Transfers (Bed/Chair) Score 5   Mobility (Level Surface) Score 0   Stairs Score 0   Barthel Index Score 20   Modified [place] Scale   Modified Melvin Scale 4     GOALS    1) Pt will increase activity tolerance to G for 30 min txment sessions    2) Pt will complete UB/LB dressing/self care w/ Min A using adaptive device and DME as needed    3) Pt will complete bathing w/Min A w/ use of AE and DME as needed    4) Pt will complete toileting w/ Min A w/ G hygiene/thoroughness using DME as needed    5) Pt will improve functional transfers to Min A on/off all surfaces using DME as needed w/ G balance/safety     6) Pt will improve functional mobility during ADL/IADL/leisure tasks to [employer] DME as needed w/ G balance/safety     7) Pt will engage in ongoing cognitive assessment w/ G participation to assist w/ safe d/c planning/recommendations    8) Pt will demonstrate G carryover of pt/caregiver education and training as appropriate          Carlos Hazel, MS, OTR/L

## 2019-02-11 NOTE — PLAN OF CARE
Problem: Nutrition/Hydration-ADULT  Goal: Nutrient/Hydration intake appropriate for improving, restoring or maintaining nutritional needs  Description  Monitor and assess patient's nutrition/hydration status for malnutrition (ex- brittle hair, bruises, dry skin, pale skin and conjunctiva, muscle wasting, smooth red tongue, and disorientation)  Collaborate with interdisciplinary team and initiate plan and interventions as ordered  Monitor patient's weight and dietary intake as ordered or per policy  Utilize nutrition screening tool and intervene per policy  Determine patient's food preferences and provide high-protein, high-caloric foods as appropriate       INTERVENTIONS:  - Monitor oral intake, urinary output, labs, and treatment plans  - Assess nutrition and hydration status and recommend course of action  - Evaluate amount of meals eaten  - Assist patient with eating if necessary   - Allow adequate time for meals  - Recommend/ encourage appropriate diets, oral nutritional supplements, and vitamin/mineral supplements  - Order, calculate, and assess calorie counts as needed  - Recommend, monitor, and adjust tube feedings and TPN/PPN based on assessed needs  - Assess need for intravenous fluids  - Provide specific nutrition/hydration education as appropriate  - Include patient/family/caregiver in decisions related to nutrition  Outcome: Progressing

## 2019-02-11 NOTE — ASSESSMENT & PLAN NOTE
· Last echo showed EF 40%  · Repeat echo pending  · Note also low albumin  · Home regimen: Lasix 40 mg daily and metolazone 2 5 mg daily  · IV Lasix  · I&Os, daily weights  · Heart failure team following  · Not sure why he isn't on BB or ACEI at baseline   Started low dose metoprolol

## 2019-02-12 NOTE — PROGRESS NOTES
Heart Failure Service Progress Note - Mara Credit 82 y o  male MRN: [id number]    Unit/Bed#: [facility] 520-01 Encounter: [id number]      Assessment:    Principal Problem:    Acute on chronic systolic heart failure (HCC)  Active Problems:    Paroxysmal A-fib (HCC)    Pleural effusion    NSTEMI (non-ST elevated myocardial infarction) (HCC)    Dysphagia    Stage 3 chronic kidney disease (HCC)    Chronic respiratory failure with hypoxia (HCC)    Thrombocytopenia (HCC)      Subjective:   Patient seen and examined  No significant events overnight  WWP  MAPS 70-75  AF with HR   Possible family meeting today for [address] discussion  Objective: Intake/ Output:1252/2240  Weight: 182 lbs, 193 on admit  Tele: Atrial fib with occ PVCs NSVT>     TTE 2018:  LVEF: 40%  LVIDd: 5 2 cm  RV:size at upper limits of normal, systolic function low normal   MR:mild to moderate  PASP:55-60 mmHg  RVOT:   Other:Mild diffuse hypokinesis, wall thickness increased, concentric hypertrophy  Neurohormonal Blockade:  --Beta-Blocker: Metoprolol 12 5 mg PO BID  --ACEi, ARB or ARNi: CKD precludes  (or SVR reduction)  --Aldosterone Receptor Blocker: CKD precludes  --Diuretic: Furosemide 40 mg IV BID, stop today  Start PO tomorrow AM>     Sudden Cardiac Death Risk Reduction:  --ICD: EF 50%  Interrogation:       Plan: 1  Acute on chronic combined systolic and diastolic CHF, HFpEF, LVIDd 5 2 cm, NYHA Class III/IV symptoms, ACC/AHA stage C  LVEF per TTE this admission actually appears to have improved from last study in June of 2018 and  estimated at 50%  by visual assessment, full report pending  Etiology unclear, but likely NICM and 2/2 longstanding HTN, possible tachy-mediated in the setting of PAF, +/- possible infiltrative cause, +/- ischemia, +/-valvular in the setting of mild to moderate AS  Clinically, volume status improved overall, still has mildly elevated JVP,  warm and well perfused  Weight is down   Small bump in creat today to 1  35  Already given IV dose of Lasix this AM  Will hold afternoon dose with plan to start PO tomorrow AM  Continue low dose Metoprolol with plan to increase dose for better HR control if BP allows  Start Spironolactone prior to discharge if renal function allows  Continue to monitor daily weights, strict I's and O's, BMP  Per patient's request,  we will proceed conservatively until family is present to discuss [address]  Patient is also under the care of [facility] and ultimately most of patient's care will be deferred to them upon discharge       2  HTN  Generally well controlled on current regimen       3  NSTEMI  Troponin peaking at 0 32  Likely 2/2 to #1  No anginal type symptoms at present and no acute EKG changes noted       4  PAF  Currently in atrial fibrillation with ventricular rate of  bpm  Continue on Metoprolol with plan to increase dose if able for better rate control  Likely not an appropriate candidate for oral AC due to risk for falls        5  Mild to Moderate AS       6  CKD  Baseline creat 1 2-1 3  1 35 today                Central Line (day, reason): Chapa catheter (day, reason):    Vitals: Blood pressure 94/68, pulse 92, temperature 97 6 °F (36 4 °C), temperature source Oral, resp  rate 16, height 5' 10" (1 778 m), weight 82 9 kg (182 lb 12 2 oz), SpO2 98 %  , Body mass index is 26 22 kg/m² , I/O last 3 completed shifts: In: 2677 [P O :1312]  Out: 2825 [Urine:2825]  No intake/output data recorded  Wt Readings from Last 3 Encounters:   02/12/19 82 9 kg (182 lb 12 2 oz)   01/18/19 86 3 kg (190 lb 3 2 oz)   06/25/18 74 7 kg (164 lb 10 9 oz)       Intake/Output Summary (Last 24 hours) at 2/12/2019 0843  Last data filed at 2/12/2019 0601  Gross per 24 hour   Intake 1252 ml   Output 2027 ml   Net -775 ml     I/O last 3 completed shifts:   In: 7987 [P O :1312]  Out: 2825 [Urine:2825]    Atrial fibrillation with occ PVCs, NSVT       Physical Exam:  Vitals:    02/11/19 2339 02/12/19 0232 02/12/19 0600 02/12/19 0700   BP: 113/62 98/70  94/68   Pulse: (!) 120 89  92   Resp: 18 18  16   Temp: 98 2 °F (36 8 °C) 98 1 °F (36 7 °C)  97 6 °F (36 4 °C)   TempSrc: Oral Oral  Oral   SpO2: 96% 94%  98%   Weight:   82 9 kg (182 lb 12 2 oz)    Height:           GEN: Michael Alexander appears well, alert and oriented x 3, pleasant and cooperative   HEENT: pupils equal, round, and reactive to light; extraocular muscles intact  NECK: supple, no carotid bruits   HEART: irregular rhythm, normal S1 and S2, no murmurs, clicks, gallops or rubs, JVP is mildly elevated   LUNGS: clear to auscultation bilaterally; no wheezes, rales, or rhonchi   ABDOMEN: normal bowel sounds, soft, no tenderness, no distention  EXTREMITIES: peripheral pulses normal; no clubbing, cyanosis, trace BLLE edema  NEURO: no focal findings   SKIN: normal without suspicious lesions on exposed skin      Current Facility-Administered Medications:     acetaminophen (TYLENOL) tablet 650 mg, 650 mg, Oral, Q6H PRN, Macungie Roche, PA-C    albuterol inhalation solution 2 5 mg, 2 5 mg, Nebulization, Q6H PRN, Cayla Roche, PA-C    aluminum-magnesium hydroxide-simethicone (MYLANTA) 200-200-20 mg/5 mL oral suspension 30 mL, 30 mL, Oral, Q4H PRN, Macungie Roche, PA-C    aspirin chewable tablet 81 mg, 81 mg, Oral, Daily, Cayla Roche, PA-C, 81 mg at 02/12/19 0801    docusate sodium (COLACE) capsule 100 mg, 100 mg, Oral, BID, Macungie Roche, PA-C, 100 mg at 02/12/19 0801    finasteride (PROSCAR) tablet 5 mg, 5 mg, Oral, Daily, Cayla Roche, PA-C, 5 mg at 02/12/19 0802    heparin (porcine) subcutaneous injection 5,000 Units, 5,000 Units, Subcutaneous, Q8H [address], Macungie Roche, PA-C, 5,000 Units at 02/12/19 1396    metoprolol succinate (TOPROL-XL) 24 hr tablet 12 5 mg, 12 5 mg, Oral, BID, ELEN Manrique, 12 5 mg at 02/12/19 0806    ondansetron (ZOFRAN) injection 4 mg, 4 mg, Intravenous, Q8H PRN, Cayla RocheMELCHOR    potassium chloride (K-DUR,KLOR-CON) CR tablet 40 mEq, 40 mEq, Oral, Once, ELEN Freed    saccharomyces boulardii (FLORASTOR) capsule 250 mg, 250 mg, Oral, BID, Chrissy Beverage, PA-C, 250 mg at 02/12/19 0802    senna (SENOKOT) tablet 8 6 mg, 1 tablet, Oral, HS, Chrissy Beverage, PA-C, 8 6 mg at 02/11/19 2207      Labs & Results:    Results from last 7 days   Lab Units 02/10/19  0438 02/09/19  2222 02/09/19  1511   TROPONIN I ng/mL 0 31* 0 32* 0 28*     Results from last 7 days   Lab Units 02/12/19  0517 02/11/19  0431 02/10/19  0438   WBC Thousand/uL 4 53 4 39 4 29*   HEMOGLOBIN g/dL 11 1* 11 0* 11 1*   HEMATOCRIT % 37 9 36 8 37 8   PLATELETS Thousands/uL  --   --  82*         Results from last 7 days   Lab Units 02/12/19  0517 02/11/19  0431 02/10/19  0438 02/09/19  1511   POTASSIUM mmol/L 3 6 3 9 3 6 4 7   CHLORIDE mmol/L 94* 98* 98* 99*   CO2 mmol/L 41* 38* 39* 39*   BUN mg/dL 52* 51* 47* 49*   CREATININE mg/dL 1 35* 1 27 1 24 1 32*   CALCIUM mg/dL 8 7 8 5 8 7 8 7   ALK PHOS U/L  --   --   --  105   ALT U/L  --   --   --  27   AST U/L  --   --   --  42     Results from last 7 days   Lab Units 02/09/19  1558   INR  1 08            Counseling / Coordination of Care  Total floor / unit time spent today 20 minutes  Greater than 50% of total time was spent with the patient and / or family counseling and / or coordination of care  A description of the counseling / coordination of care: 20

## 2019-02-12 NOTE — ASSESSMENT & PLAN NOTE
· Suspect due to volume overload but would obtain serial imaging- may need thoracentesis if not improved with diuresis  · Repeat chest x-ray redemonstrates bilateral pulmonary opacities and bilateral effusions

## 2019-02-12 NOTE — PROGRESS NOTES
Progress Note Venkat Massey 1935, 82 y o  male MRN: [id number]  Unit/Bed#: [facility] 520-01 Encounter: [id number]  Primary Care Provider: Sherri Villalba DO   Date and time admitted to hospital: 2/9/2019  3:02 PM    * Acute on chronic systolic heart failure ([facility] )  Assessment & Plan  · Last echo showed EF 40%  · Repeat ECHO demonstrates EF 50% and moderate-severe AS  There is also moderate-severe TR and severe pulmonary HTN  · Note also low albumin  · Per cardiology, will plan for 1 more dose IV diuretics then transition back to PO regimen including Lasix 40 mg daily and metolazone 2 5 mg daily  · I&Os, daily weights  · Started low dose metoprolol    Chronic respiratory failure with hypoxia (HCC)  Assessment & Plan  · On 2L O2 at baseline    Stage 3 chronic [facility] ([facility]  Assessment & Plan  · Baseline creatinine: 1 2-1 3  · Monitor with diuresis    Dysphagia  Assessment & Plan  · Speech swallow eval unremarkable  · If symptoms persist, consider GI eval at some point to r/o esophageal issue    NSTEMI (non-ST elevated myocardial infarction) [facility])  Assessment & Plan  · Suspect type 2 related to volume overload  · Initially placed on heparin drip which has since been discontinued    Pleural effusion  Assessment & Plan  · Suspect due to volume overload but would obtain serial imaging- may need thoracentesis if not improved with diuresis  · Repeat chest x-ray redemonstrates bilateral pulmonary opacities and bilateral effusions  Paroxysmal A-fib (HCC)  Assessment & Plan  · With RVR on admission  · Patient not on anticoagulation or AV gui blockers at baseline  · Started low dose beta blocker  · TSH high  Free T4 within normal limits- repeat labs in 6 weeks      VTE Pharmacologic Prophylaxis:   Pharmacologic: Heparin  Mechanical VTE Prophylaxis in Place: Yes    Patient Centered Rounds: I have performed bedside rounds with nursing staff today      Discussions with Specialists or Other Care Team Provider: None    Education and Discussions with Family / Patient: All patient questions answered to the best of my ability  Time Spent for Care: 20 minutes  More than 50% of total time spent on counseling and coordination of care as described above  Current Length of Stay: 3 day(s)    Current Patient Status: Inpatient   Certification Statement: The patient will continue to require additional inpatient hospital stay due to continued need for diuresis  Discharge Plan: Likely discharge in next 24-48 hours  Code Status: Level 1 - Full Code      Subjective:   Patient states his breathing is better but not yet back to baseline  Otherwise, he has no complaints  Nursing noticed a small area of draining at site of well healed surgical scar in the lumbar region  Objective:     Vitals:   Temp (24hrs), Av °F (36 7 °C), Min:97 5 °F (36 4 °C), Max:98 3 °F (36 8 °C)    Temp:  [97 5 °F (36 4 °C)-98 3 °F (36 8 °C)] 97 5 °F (36 4 °C)  HR:  [] 82  Resp:  [16-20] 18  BP: ()/(56-76) 98/70  SpO2:  [94 %-98 %] 98 %  Body mass index is 26 22 kg/m²  Input and Output Summary (last 24 hours): Intake/Output Summary (Last 24 hours) at 2019 1410  Last data filed at 2019 1259  Gross per 24 hour   Intake 1100 ml   Output 2519 ml   Net -1419 ml       Physical Exam:     Physical Exam   HENT:   Head: Normocephalic and atraumatic  Mouth/Throat: Oropharynx is clear and moist and mucous membranes are normal    Eyes: No scleral icterus  Cardiovascular: Normal rate  An irregularly irregular rhythm present  Murmur heard  Pulmonary/Chest: Breath sounds normal  He has no wheezes  He has no rales  He exhibits no tenderness  Abdominal: Soft  Bowel sounds are normal  He exhibits no distension  There is no tenderness  Musculoskeletal: Normal range of motion  He exhibits no edema  Skin: Skin is warm and dry  No rash noted  There is a well-healed midline scar in the lumbar region    At the distal end of the scar, there is a small pin-point opening that is not longer draining any fluid, although there is some tan drainage on the dressing  There is no surrounding erythema  There is no palpable fluid collection noted  Psychiatric: He has a normal mood and affect  Vitals reviewed  Additional Data:     Labs:    Results from last 7 days   Lab Units 02/12/19  0517  02/10/19  0438 02/09/19  1511   WBC Thousand/uL 4 53   < > 4 29* 5 17   HEMOGLOBIN g/dL 11 1*   < > 11 1* 12 1   HEMATOCRIT % 37 9   < > 37 8 40 8   PLATELETS Thousands/uL  --   --  82*  --    NEUTROS PCT %  --   --   --  75   LYMPHS PCT %  --   --   --  13*   MONOS PCT %  --   --   --  10   EOS PCT %  --   --   --  1    < > = values in this interval not displayed  Results from last 7 days   Lab Units 02/12/19 0517 02/09/19  1511   SODIUM mmol/L 139   < > 139   POTASSIUM mmol/L 3 6   < > 4 7   CHLORIDE mmol/L 94*   < > 99*   CO2 mmol/L 41*   < > 39*   BUN mg/dL 52*   < > 49*   CREATININE mg/dL 1 35*   < > 1 32*   ANION GAP mmol/L 4   < > 1*   CALCIUM mg/dL 8 7   < > 8 7   ALBUMIN g/dL  --   --  2 9*   TOTAL BILIRUBIN mg/dL  --   --  0 77   ALK PHOS U/L  --   --  105   ALT U/L  --   --  27   AST U/L  --   --  42   GLUCOSE RANDOM mg/dL 85   < > 91    < > = values in this interval not displayed  Results from last 7 days   Lab Units 02/09/19  1558   INR  1 08       * I Have Reviewed All Lab Data Listed Above  * Additional Pertinent Lab Tests Reviewed:  All Labs Within Last 24 Hours Reviewed    Imaging:    Imaging Reports Reviewed Today Include: None new  Imaging Personally Reviewed by Myself Includes:  n/a    Recent Cultures (last 7 days):     Last 24 Hours Medication List:     Current Facility-Administered Medications:  acetaminophen 650 mg Oral Q6H PRN Spike Lamar PA-C   albuterol 2 5 mg Nebulization Q6H PRN Spike Lamar PA-C   aluminum-magnesium hydroxide-simethicone 30 mL Oral Q4H PRN Spike Lamar PA-C   aspirin 81 mg Oral Daily Maxime Marcelino PA-C   docusate sodium 100 mg Oral BID Maxime Marcelino PA-C   finasteride 5 mg Oral Daily Maxime Marcelino PA-C   heparin (porcine) 5,000 Units Subcutaneous [facility] Maxime Marcelino PA-C   metoprolol succinate 12 5 mg Oral BID ELEN Manrique   ondansetron 4 mg Intravenous Q8H PRN Maxime Marcelino PA-C   saccharomyces boulardii 250 mg Oral BID Maxime Marcelino PA-C   senna 1 tablet Oral HS Maxime Marcelino PA-C        Today, Patient Was Seen By: Serge Whitney PA-C    ** Please Note: Dictation voice to text software may have been used in the creation of this document   **

## 2019-02-12 NOTE — SOCIAL WORK
Message received from [employer] , [place], [phone] requesting an update  Called back and left message for her to call CM

## 2019-02-12 NOTE — ASSESSMENT & PLAN NOTE
· Last echo showed EF 40%  · Repeat ECHO demonstrates EF 50% and moderate-severe AS  There is also moderate-severe TR and severe pulmonary HTN    · Note also low albumin  · Per cardiology, will plan for 1 more dose IV diuretics then transition back to PO regimen including Lasix 40 mg daily and metolazone 2 5 mg daily  · I&Os, daily weights  · Started low dose metoprolol

## 2019-02-12 NOTE — PLAN OF CARE
Problem: Potential for Falls  Goal: Patient will remain free of falls  Description  INTERVENTIONS:  - Assess patient frequently for physical needs  -  Identify cognitive and physical deficits and behaviors that affect risk of falls    -  [place] fall precautions as indicated by assessment   - Educate patient/family on patient safety including physical limitations  - Instruct patient to call for assistance with activity based on assessment  - Modify environment to reduce risk of injury  - Consider OT/PT consult to assist with strengthening/mobility   Outcome: Progressing     Problem: Prexisting or High Potential for Compromised Skin Integrity  Goal: Skin integrity is maintained or improved  Description  INTERVENTIONS:  - Identify patients at risk for skin breakdown  - Assess and monitor skin integrity  - Assess and monitor nutrition and hydration status  - Monitor labs (i e  albumin)  - Assess for incontinence   - Turn and reposition patient  - Assist with mobility/ambulation  - Relieve pressure over bony prominences  - Avoid friction and shearing  - Provide appropriate hygiene as needed including keeping skin clean and dry  - Evaluate need for skin moisturizer/barrier cream  - Collaborate with interdisciplinary team (i e  Nutrition, Rehabilitation, etc )   - Patient/family teaching   Outcome: Progressing     Problem: PAIN - ADULT  Goal: Verbalizes/displays adequate comfort level or baseline comfort level  Description  Interventions:  - Encourage patient to monitor pain and request assistance  - Assess pain using appropriate pain scale  - Administer analgesics based on type and severity of pain and evaluate response  - Implement non-pharmacological measures as appropriate and evaluate response  - Consider cultural and social influences on pain and pain management  - Notify physician/advanced practitioner if interventions unsuccessful or patient reports new pain   Outcome: Progressing     Problem: INFECTION - ADULT  Goal: Absence or prevention of progression during hospitalization  Description  INTERVENTIONS:  - Assess and monitor for signs and symptoms of infection  - Monitor lab/diagnostic results  - Monitor all insertion sites, i e  indwelling lines, tubes, and drains  - Monitor endotracheal (as able) and nasal secretions for changes in amount and color  - [place] appropriate cooling/warming therapies per order  - Administer medications as ordered  - Instruct and encourage patient and family to use good hand hygiene technique  - Identify and instruct in appropriate isolation precautions for identified infection/condition   Outcome: Progressing  Goal: Absence of fever/infection during neutropenic period  Description  INTERVENTIONS:  - Monitor WBC  - Implement neutropenic guidelines   Outcome: Progressing     Problem: SAFETY ADULT  Goal: Maintain or return to baseline ADL function  Description  INTERVENTIONS:  -  Assess patient's ability to carry out ADLs; assess patient's baseline for ADL function and identify physical deficits which impact ability to perform ADLs (bathing, care of mouth/teeth, toileting, grooming, dressing, etc )  - Assess/evaluate cause of self-care deficits   - Assess range of motion  - Assess patient's mobility; develop plan if impaired  - Assess patient's need for assistive devices and provide as appropriate  - Encourage maximum independence but intervene and supervise when necessary  ¯ Involve family in performance of ADLs  ¯ Assess for home care needs following discharge   ¯ Request OT consult to assist with ADL evaluation and planning for discharge  ¯ Provide patient education as appropriate   Outcome: Progressing  Goal: Maintain or return mobility status to optimal level  Description  INTERVENTIONS:  - Assess patient's baseline mobility status (ambulation, transfers, stairs, etc )    - Identify cognitive and physical deficits and behaviors that affect mobility  - Identify mobility aids required to assist with transfers and/or ambulation (gait belt, sit-to-stand, lift, walker, cane, etc )  - [place] fall precautions as indicated by assessment  - Record patient progress and toleration of activity level on Mobility SBAR; progress patient to next Phase/Stage  - Instruct patient to call for assistance with activity based on assessment  - Request Rehabilitation consult to assist with strengthening/weightbearing, etc    Outcome: Progressing     Problem: DISCHARGE PLANNING  Goal: Discharge to home or other facility with appropriate resources  Description  INTERVENTIONS:  - Identify barriers to discharge w/patient and caregiver  - Arrange for needed discharge resources and transportation as appropriate  - Identify discharge learning needs (meds, wound care, etc )  - Arrange for interpretive services to assist at discharge as needed  - Refer to Case Management Department for coordinating discharge planning if the patient needs post-hospital services based on physician/advanced practitioner order or complex needs related to functional status, cognitive ability, or social support system   Outcome: Progressing     Problem: Knowledge Deficit  Goal: Patient/family/caregiver demonstrates understanding of disease process, treatment plan, medications, and discharge instructions  Description  Complete learning assessment and assess knowledge base    Interventions:  - Provide teaching at level of understanding  - Provide teaching via preferred learning methods   Outcome: Progressing     Problem: CARDIOVASCULAR - ADULT  Goal: Maintains optimal cardiac output and hemodynamic stability  Description  INTERVENTIONS:  - Monitor I/O, vital signs and rhythm  - Monitor for S/S and trends of decreased cardiac output i e  bleeding, hypotension  - Administer and titrate ordered vasoactive medications to optimize hemodynamic stability  - Assess quality of pulses, skin color and temperature  - Assess for signs of decreased coronary artery perfusion - ex   Angina  - Instruct patient to report change in severity of symptoms   Outcome: Progressing  Goal: Absence of cardiac dysrhythmias or at baseline rhythm  Description  INTERVENTIONS:  - Continuous cardiac monitoring, monitor vital signs, obtain 12 lead EKG if indicated  - Administer antiarrhythmic and heart rate control medications as ordered  - Monitor electrolytes and administer replacement therapy as ordered   Outcome: Progressing     Problem: RESPIRATORY - ADULT  Goal: Achieves optimal ventilation and oxygenation  Description  INTERVENTIONS:  - Assess for changes in respiratory status  - Assess for changes in mentation and behavior  - Position to facilitate oxygenation and minimize respiratory effort  - Oxygen administration by appropriate delivery method based on oxygen saturation (per order) or ABGs  - Initiate smoking cessation education as indicated  - Encourage broncho-pulmonary hygiene including cough, deep breathe, Incentive Spirometry  - Assess the need for suctioning and aspirate as needed  - Assess and instruct to report SOB or any respiratory difficulty  - Respiratory Therapy support as indicated   Outcome: Progressing     Problem: SKIN/TISSUE INTEGRITY - ADULT  Goal: Skin integrity remains intact  Description  INTERVENTIONS  - Identify patients at risk for skin breakdown  - Assess and monitor skin integrity  - Assess and monitor nutrition and hydration status  - Monitor labs (i e  albumin)  - Assess for incontinence   - Turn and reposition patient  - Assist with mobility/ambulation  - Relieve pressure over bony prominences  - Avoid friction and shearing  - Provide appropriate hygiene as needed including keeping skin clean and dry  - Evaluate need for skin moisturizer/barrier cream  - Collaborate with interdisciplinary team (i e  Nutrition, Rehabilitation, etc )   - Patient/family teaching   Outcome: Progressing  Goal: Incision(s), wounds(s) or drain site(s) healing without S/S of infection  Description  INTERVENTIONS  - Assess and document risk factors for skin impairment   - Assess and document dressing, incision, wound bed, drain sites and surrounding tissue  - Initiate Nutrition services consult and/or wound management as needed   Outcome: Progressing  Goal: Oral mucous membranes remain intact  Description  INTERVENTIONS  - Assess oral mucosa and hygiene practices  - Implement preventative oral hygiene regimen  - Implement oral medicated treatments as ordered  - Initiate Nutrition services referral as needed   Outcome: Progressing     Problem: DISCHARGE PLANNING - CARE MANAGEMENT  Goal: Discharge to post-acute care or home with appropriate resources  Description  INTERVENTIONS:  - Conduct assessment to determine patient/family and health care team treatment goals, and need for post-acute services based on payer coverage, community resources, and patient preferences, and barriers to discharge  - Address psychosocial, clinical, and financial barriers to discharge as identified in assessment in conjunction with the patient/family and health care team  - Arrange appropriate level of post-acute services according to patient?s   needs and preference and payer coverage in collaboration with the physician and health care team  - Communicate with and update the patient/family, physician, and health care team regarding progress on the discharge plan  - Arrange appropriate transportation to post-acute venues  Outcome: Progressing     Problem: Nutrition/Hydration-ADULT  Goal: Nutrient/Hydration intake appropriate for improving, restoring or maintaining nutritional needs  Description  Monitor and assess patient's nutrition/hydration status for malnutrition (ex- brittle hair, bruises, dry skin, pale skin and conjunctiva, muscle wasting, smooth red tongue, and disorientation)  Collaborate with interdisciplinary team and initiate plan and interventions as ordered    Monitor patient's weight and dietary intake as ordered or per policy  Utilize nutrition screening tool and intervene per policy  Determine patient's food preferences and provide high-protein, high-caloric foods as appropriate       INTERVENTIONS:  - Monitor oral intake, urinary output, labs, and treatment plans  - Assess nutrition and hydration status and recommend course of action  - Evaluate amount of meals eaten  - Assist patient with eating if necessary   - Allow adequate time for meals  - Recommend/ encourage appropriate diets, oral nutritional supplements, and vitamin/mineral supplements  - Order, calculate, and assess calorie counts as needed  - Recommend, monitor, and adjust tube feedings and TPN/PPN based on assessed needs  - Assess need for intravenous fluids  - Provide specific nutrition/hydration education as appropriate  - Include patient/family/caregiver in decisions related to nutrition  Outcome: Progressing

## 2019-02-13 NOTE — PROGRESS NOTES
Progress Note Nakita Shepherd 1935, 82 y o  male MRN: [id number]  Unit/Bed#: [facility] 520-01 Encounter: [id number]  Primary Care Provider: Ceci Saeed DO   Date and time admitted to hospital: 2/9/2019  3:02 PM    * Acute on chronic systolic heart failure ([address] )  Assessment & Plan  · Last echo showed EF 40%  · Repeat ECHO demonstrates EF 50% and moderate-severe AS  There is also moderate-severe TR and severe pulmonary HTN  · Note also low albumin  · Anticipate transition to PO regimen including Lasix 40 mg daily today  Will hold off on starting metolazone for now  · I&Os, daily weights  · Weights are stable  · Started low dose metoprolol    Chronic respiratory failure with hypoxia (HCC)  Assessment & Plan  · On 2L O2 at baseline    Stage 3 chronic kidney disease (HCC)  Assessment & Plan  · Baseline creatinine: 1 2-1 3  · Monitor with diuresis    Dysphagia  Assessment & Plan  · Speech swallow eval unremarkable  · If symptoms persist, consider GI eval at some point to r/o esophageal issue    NSTEMI (non-ST elevated myocardial infarction) [facility])  Assessment & Plan  · Suspect type 2 related to volume overload  · Initially placed on heparin drip which has since been discontinued    Pleural effusion  Assessment & Plan  · Suspect due to volume overload but would obtain serial imaging  Consider thoracentesis if respiratory status decompensates  · Repeat chest x-ray redemonstrates bilateral pulmonary opacities and bilateral effusions, overall unchanged  Patient without respiratory distress  No intervention at this time  Paroxysmal A-fib (HCC)  Assessment & Plan  · With RVR on admission  · Patient not on anticoagulation or AV gui blockers at baseline  · Started low dose beta blocker  · TSH high  Free T4 within normal limits- repeat labs in 6 weeks      VTE Pharmacologic Prophylaxis:   Pharmacologic: Heparin  Mechanical: Mechanical VTE prophylaxis in place      Patient Centered Rounds: I have performed bedside rounds with nursing staff today  Discussions with Specialists or Other Care Team Provider: Cardiology  Education and Discussions with Family / Patient: All patient questions answered to the best of my ability  Time Spent for Care: 20 minutes  More than 50% of total time spent on counseling and coordination of care as described above  Current Length of Stay: 4 day(s)  Current Patient Status: Inpatient   Certification Statement: The patient will continue to require additional inpatient hospital stay due to monitoring after transition to PO diuretics  Discharge Plan: Patient is not yet medically stable for discharge  Anticipate discharge tomorrow to [facility] if patient tolerates oral diuretics well  Code Status: Level 1 - Full Code    Subjective:   Patient is doing well overall  He occasionally feels short of breath but for the most part feels at baseline  Otherwise, he has no new complaints  Objective:   Vitals:   Temp (24hrs), Av 8 °F (36 6 °C), Min:97 6 °F (36 4 °C), Max:98 1 °F (36 7 °C)    Temp:  [97 6 °F (36 4 °C)-98 1 °F (36 7 °C)] 97 6 °F (36 4 °C)  HR:  [81-98] 89  Resp:  [16-18] 18  BP: (107-167)/(54-80) 117/54  SpO2:  [96 %-99 %] 99 %  Body mass index is 26 16 kg/m²  Input and Output Summary (last 24 hours): Intake/Output Summary (Last 24 hours) at 2019 1225  Last data filed at 2019 1027  Gross per 24 hour   Intake 600 ml   Output 1889 ml   Net -1289 ml       Physical Exam:     Physical Exam   Constitutional: He appears cachectic  HENT:   Head: Normocephalic and atraumatic  Mouth/Throat: Oropharynx is clear and moist and mucous membranes are normal    Eyes: No scleral icterus  Cardiovascular: Normal rate  An irregularly irregular rhythm present  Murmur heard  Pulmonary/Chest: Breath sounds normal  He has no wheezes  He has no rales  He exhibits no tenderness  Abdominal: Soft  Bowel sounds are normal  He exhibits no distension   There is no tenderness  Musculoskeletal: Normal range of motion  He exhibits no edema  Severe arthritic changes of multiple joints  Skin: Skin is warm and dry  No rash noted  Psychiatric: He has a normal mood and affect  Vitals reviewed  Additional Data:   Labs:  Results from last 7 days   Lab Units 02/12/19  0517  02/10/19  0438 02/09/19  1511   WBC Thousand/uL 4 53   < > 4 29* 5 17   HEMOGLOBIN g/dL 11 1*   < > 11 1* 12 1   HEMATOCRIT % 37 9   < > 37 8 40 8   PLATELETS Thousands/uL  --   --  82*  --    NEUTROS PCT %  --   --   --  75   LYMPHS PCT %  --   --   --  13*   MONOS PCT %  --   --   --  10   EOS PCT %  --   --   --  1    < > = values in this interval not displayed  Results from last 7 days   Lab Units 02/13/19  0535  02/09/19  1511   POTASSIUM mmol/L 4 6   < > 4 7   CHLORIDE mmol/L 95*   < > 99*   CO2 mmol/L 40*   < > 39*   BUN mg/dL 50*   < > 49*   CREATININE mg/dL 1 30   < > 1 32*   CALCIUM mg/dL 8 8   < > 8 7   ALK PHOS U/L  --   --  105   ALT U/L  --   --  27   AST U/L  --   --  42    < > = values in this interval not displayed  Results from last 7 days   Lab Units 02/09/19  1558   INR  1 08       * I Have Reviewed All Lab Data Listed Above  * Additional Pertinent Lab Tests Reviewed: All Labs Within Last 24 Hours Reviewed    Imaging:    Imaging Reports Reviewed Today Include: None new    Cultures:   Blood Culture:   Lab Results   Component Value Date    BLOODCX No Growth After 5 Days  06/21/2018    BLOODCX No Growth After 5 Days   06/21/2018     Urine Culture: No results found for: URINECX  Sputum Culture: No components found for: SPUTUMCX  Wound Culture: No results found for: WOUNDCULT    Last 24 Hours Medication List:     Current Facility-Administered Medications:  acetaminophen 650 mg Oral Q6H PRN Gloria Crawford PA-C   albuterol 2 5 mg Nebulization Q6H PRN Gloria Crawford PA-C   aluminum-magnesium hydroxide-simethicone 30 mL Oral Q4H PRN Gloria Crawford PA-C   aspirin 81 mg Oral Daily Chrissy Beverage, PA-C   docusate sodium 100 mg Oral BID Chrissy Beverage, PAKAJAL   finasteride 5 mg Oral Daily Chrissy Beverage, MELCHOR   furosemide 40 mg Oral Daily Stephen Ma MD   heparin (porcine) 5,000 Units Subcutaneous [facility] Chrissy Beverage, PAKAJAL   metoprolol succinate 12 5 mg Oral BID ELEN Manrique   ondansetron 4 mg Intravenous Q8H PRN Chrissy Beverage, MELCHOR   saccharomyces boulardii 250 mg Oral BID Chrissy Beverage, MELCHOR   senna 1 tablet Oral HS Chrissy Beverage, MELCHOR        Today, Patient Was Seen By: Nitza De La Cruz PA-C    ** Please Note: Dragon 360 Dictation voice to text software may have been used in the creation of this document   **

## 2019-02-13 NOTE — UTILIZATION REVIEW
Continued Stay Review    Date: 2/13    Vital Signs: /54 (BP Location: Right arm)   Pulse 89   Temp 97 6 °F (36 4 °C) (Oral)   Resp 18   Ht 5' 10" (1 778 m)   Wt 82 7 kg (182 lb 5 1 oz)   SpO2 99%   BMI 26 16 kg/m²      Assessment/Plan:   Acute on chronic systolic heart failure/ Chronic respiratory failure with hypoxia/ Stage 3 CKD    Anticipate transition to PO regimen including Lasix 40 mg daily today  Will hold off on starting metolazone for now  I&Os, daily wts  Started on low dose metoprolol     O2 2L at baseline  Baseline creatinine: 1 2-1 3  Monitor with diuresis    The patient will continue to require additional inpatient hospital stay due to monitoring after transition to PO diuretics    Medications:   Scheduled Meds:   Current Facility-Administered Medications:  aspirin 81 mg Oral Daily   docusate sodium 100 mg Oral BID   finasteride 5 mg Oral Daily   furosemide 40 mg Oral Daily   heparin (porcine) 5,000 Units Subcutaneous Q8H [facility]   metoprolol succinate 12 5 mg Oral BID   saccharomyces boulardii 250 mg Oral BID   senna 1 tablet Oral HS     Continuous Infusions:    PRN Meds:   acetaminophen    albuterol    aluminum-magnesium hydroxide-simethicone    ondansetron     Pertinent Labs/Diagnostic Results:   No new    Age/Sex: 82 y o  male     Discharge Plan: Return to [facility]

## 2019-02-13 NOTE — OCCUPATIONAL THERAPY NOTE
[address] Progress Note     Patient Name: Madelyn Rivas  HQOJD'Y Date: 2/13/2019  Problem List  Patient Active Problem List   Diagnosis    Paroxysmal A-fib ([facility] )    Essential hypertension    Acute on chronic systolic heart failure (HCC)    Cardiomyopathy ([facility] )    Chronic venous insufficiency    Type 2 myocardial infarction ([facility] )    Pleural effusion, bilateral    Acute kidney injury ([facility] )    Ambulatory dysfunction    Nodule of kidney    Pleural effusion    NSTEMI (non-ST elevated myocardial infarction) ([facility] )    Dysphagia    Stage 3 chronic kidney disease ([facility] )    Chronic respiratory failure with hypoxia ([facility] )    Thrombocytopenia ([facility] )           02/13/19 0916   Restrictions/Precautions   Weight Bearing Precautions Per Order No   Other Precautions Chair Alarm;Cognitive; Fall Risk   General   Response to Previous Treatment Patient with no complaints from previous session   Lifestyle   Autonomy PTA, pt required A for ADLs, IADLs, and functional mobility  He receives A from aides in the morning and evening for 30 minutes each (8:30-9am, 7-7:30pm) and his son assist with IADLs PRN   Reciprocal Relationships supportive son   Service to Others retired   [address] enjoys playing cards at [employer]   Pain Assessment   Pain Assessment No/denies pain   Pain Score No Pain   ADL   Where Assessed Chair   Grooming Assistance 5  Supervision/Setup   Grooming Deficit Wash/dry hands; Wash/dry face; Teeth care;Denture care;Brushing hair   UB Bathing Assistance 4  Minimal Assistance   UB Bathing Deficit Increased time to complete;Right arm;Left arm; Abdomen; Chest   LB Bathing Assistance 3  Moderate Assistance   LB Bathing Deficit Right lower leg including foot; Left lower leg including foot   UB Dressing Assistance 4  Minimal Assistance   UB Dressing Deficit Thread RUE; Thread LUE; Increased time to complete   LB Dressing Assistance 2  Maximal Assistance   LB Dressing Deficit Don/doff R sock;Don/doff L sock; Increased time to complete   Bed Mobility   Supine to Sit 4  Minimal assistance   Additional items Assist x 1; Increased time required   Transfers   Sit to Stand 3  Moderate assistance   Additional items Assist x 1; Increased time required   Stand to Sit 3  Moderate assistance   Additional items Assist x 1; Increased time required   Stand pivot 4  Minimal assistance   Additional items Assist x 2; Increased time required   Cognition   Overall Cognitive Status Impaired   Arousal/Participation Cooperative   Attention Within functional limits   Orientation Level Oriented to person;Oriented to place;Oriented to situation;Disoriented to time   Memory Decreased recall of precautions;Decreased recall of recent events   Following Commands Follows one step commands with increased time or repetition   Comments hearing aide is currently not functioning   Activity Tolerance   Activity Tolerance Patient tolerated treatment well   Medical Staff Made Aware PCA assisted w/ transfer   Assessment   Assessment Patient participated in Skilled OT session this date with interventions consisting of ADL re training with the use of correct body mechnaics, safety awareness and fall prevention techniques,  therapeutic activities to: increase activity tolerance, increase postural control, increase trunk control and increase OOB/ sitting tolerance   Patient agreeable to OT treatment session, upon arrival patient was found supine in bed  In comparison to previous session, patient with improvements in transfers (SPT)*   Patient requiring verbal cues for correct technique and frequent rest periods  Patient continues to be functioning below baseline level, occupational performance remains limited secondary to factors listed above and increased risk for falls and injury  From OT standpoint, recommendation at time of d/c would be Short Term Rehab     Patient to benefit from continued Occupational Therapy treatment while in the hospital to address deficits as defined above and maximize level of functional independence with ADLs and functional mobility  Plan   Treatment Interventions ADL retraining;Functional transfer training; Endurance training; Compensatory technique education;Continued evaluation; Energy conservation; Activityengagement   Goal Expiration Date 02/25/19   Treatment Day 2   OT Frequency 3-5x/wk   Recommendation   OT Discharge Recommendation Short Term Rehab   OT - OK to Discharge Yes   Barthel Index   Feeding 10   Bathing 0   Grooming Score 5   Dressing Score 5   Bladder Score 0   Bowels Score 0   Toilet Use Score 5   Transfers (Bed/Chair) Score 5   Mobility (Level Surface) Score 0   Stairs Score 0   Barthel Index Score 30   Modified Melvin Scale   Modified Hale Scale 4     Carmita Marks MS, OTR/L

## 2019-02-13 NOTE — PROGRESS NOTES
Heart Failure Service Progress Note - Ayaz Orellana 82 y o  male MRN: [id number]    Unit/Bed#: [facility] 520-01 Encounter: [id number]      Assessment:    Principal Problem:    Acute on chronic systolic heart failure (HCC)  Active Problems:    Paroxysmal A-fib (HCC)    Pleural effusion    NSTEMI (non-ST elevated myocardial infarction) (HCC)    Dysphagia    Stage 3 chronic kidney disease (HCC)    Chronic respiratory failure with hypoxia (HCC)    Thrombocytopenia (HCC)    # Acute on chronic HFpEF, LVIDd 5 2 cm, NYHA III/IV, ACC/AHA Stage C: TTE by visual estimation closer to ~50%  Prior was lower as noted below  Etiology unclear  Likely NICM  Has mild cLVH on TTE suggestive of either HTN +/- mod to sev AS  Possible AFib  Less likely infiltrative given variable LVEF over time  Euvolemic  --Clydia Fass is [facility], will optimize and d/w patient/family re: further workup  --Continue BP mgmt as below  --Start lasix 40 mg PO daily today    # HTN  # NSTEMI type II: 2/2/ to HF exacerbation  # Mod to sev AS (GAUTAM 0 7-1 w/ mean grad <20 mmHg)  # CKD w/ baseline Cr 1 2-1 3    Subjective:   Patient seen and examined  No significant events overnight  Objective:     [employer] (day, reason): Chapa catheter (day, reason):    Vitals: Blood pressure 117/54, pulse 89, temperature 97 6 °F (36 4 °C), temperature source Oral, resp  rate 18, height 5' 10" (1 778 m), weight 82 7 kg (182 lb 5 1 oz), SpO2 99 %  , Body mass index is 26 16 kg/m² , I/O last 3 completed shifts: In: 960 [P O :960]  Out: 3652 [ESFKH:6290]  I/O this shift:  In: 300 [P O :300]  Out: 100 [Urine:100]  Wt Readings from Last 3 Encounters:   02/13/19 82 7 kg (182 lb 5 1 oz)   01/18/19 86 3 kg (190 lb 3 2 oz)   06/25/18 74 7 kg (164 lb 10 9 oz)       Intake/Output Summary (Last 24 hours) at 2/13/2019 1213  Last data filed at 2/13/2019 1027  Gross per 24 hour   Intake 600 ml   Output 1889 ml   Net -1289 ml     I/O last 3 completed shifts:   In: 960 [P O :960]  Out: 8991 [Urine:3652]    No significant arrhythmias seen on telemetry review       Physical Exam:  Vitals:    02/13/19 0541 02/13/19 0702 02/13/19 1024 02/13/19 1107   BP:  117/80 117/54    BP Location:  Left arm Right arm    Pulse:  81 89    Resp:  17 18    Temp:  97 6 °F (36 4 °C) 97 6 °F (36 4 °C)    TempSrc:  Oral Oral    SpO2:  97% 98% 99%   Weight: 82 7 kg (182 lb 5 1 oz)      Height:           GEN: Michael Alexander appears well, alert and oriented x 3, pleasant and cooperative   HEENT: pupils equal, round, and reactive to light; extraocular muscles intact  NECK: supple, no carotid bruits   HEART: regular rhythm, normal S1 and S2, no murmurs, clicks, gallops or rubs, JVP is    LUNGS: clear to auscultation bilaterally; no wheezes, rales, or rhonchi   ABDOMEN: normal bowel sounds, soft, no tenderness, no distention  EXTREMITIES: peripheral pulses normal; no clubbing, cyanosis, or edema  NEURO: no focal findings   SKIN: normal without suspicious lesions on exposed skin      Current Facility-Administered Medications:     acetaminophen (TYLENOL) tablet 650 mg, 650 mg, Oral, Q6H PRN, Rodo Dumont PA-C    albuterol inhalation solution 2 5 mg, 2 5 mg, Nebulization, Q6H PRN, Rodo Dumont PA-C    aluminum-magnesium hydroxide-simethicone (MYLANTA) 200-200-20 mg/5 mL oral suspension 30 mL, 30 mL, Oral, Q4H PRN, Rodo Dumont PA-C    aspirin chewable tablet 81 mg, 81 mg, Oral, Daily, oRdo Dumont PA-C, 81 mg at 02/13/19 1009    docusate sodium (COLACE) capsule 100 mg, 100 mg, Oral, BID, Rodo Dumont PA-C, 100 mg at 02/13/19 1009    finasteride (PROSCAR) tablet 5 mg, 5 mg, Oral, Daily, Rodo Dumont PA-C, 5 mg at 02/13/19 1009    heparin (porcine) subcutaneous injection 5,000 Units, 5,000 Units, Subcutaneous, Q8H [facility], Rodo Dumont PA-C, 5,000 Units at 02/13/19 0541    metoprolol succinate (TOPROL-XL) 24 hr tablet 12 5 mg, 12 5 mg, Oral, BID, Bolivar DEMPSEY ELEN [facility], 12 5 mg at 02/13/19 1008    ondansetron (ZOFRAN) injection 4 mg, 4 mg, Intravenous, Q8H PRN, Mortimer Rush, PA-C    saccharomyces boulardii (FLORASTOR) capsule 250 mg, 250 mg, Oral, BID, Mortimer Rush, PA-C, 250 mg at 02/13/19 1008    senna (SENOKOT) tablet 8 6 mg, 1 tablet, Oral, HS, Mortimer Rush, PA-C, 8 6 mg at 02/12/19 2133      Labs & Results:    Results from last 7 days   Lab Units 02/10/19  0438 02/09/19  2222 02/09/19  1511   TROPONIN I ng/mL 0 31* 0 32* 0 28*     Results from last 7 days   Lab Units 02/12/19  0517 02/11/19  0431 02/10/19  0438   WBC Thousand/uL 4 53 4 39 4 29*   HEMOGLOBIN g/dL 11 1* 11 0* 11 1*   HEMATOCRIT % 37 9 36 8 37 8   PLATELETS Thousands/uL  --   --  82*         Results from last 7 days   Lab Units 02/13/19  0535 02/12/19  0517 02/11/19  0431  02/09/19  1511   POTASSIUM mmol/L 4 6 3 6 3 9   < > 4 7   CHLORIDE mmol/L 95* 94* 98*   < > 99*   CO2 mmol/L 40* 41* 38*   < > 39*   BUN mg/dL 50* 52* 51*   < > 49*   CREATININE mg/dL 1 30 1 35* 1 27   < > 1 32*   CALCIUM mg/dL 8 8 8 7 8 5   < > 8 7   ALK PHOS U/L  --   --   --   --  105   ALT U/L  --   --   --   --  27   AST U/L  --   --   --   --  42    < > = values in this interval not displayed  Results from last 7 days   Lab Units 02/09/19  1558   INR  1 08     EKG personally reviewed by Jarome Habermann, MD      Counseling / Coordination of Care  Total floor / unit time spent today 40 minutes  Greater than 50% of total time was spent with the patient and / or family counseling and / or coordination of care  A description of the counseling / coordination of care: 20 min  Thank you for the opportunity to participate in the care of this patient      Jarome Habermann MD, PhD   Elizabeth Bailon

## 2019-02-13 NOTE — PLAN OF CARE
Problem: OCCUPATIONAL THERAPY ADULT  Goal: Performs self-care activities at highest level of function for planned discharge setting  See evaluation for individualized goals  Description  Treatment Interventions: ADL retraining, Functional transfer training, Endurance training, Cognitive reorientation, Patient/family training, Equipment evaluation/education, Compensatory technique education, Continued evaluation, Energy conservation, Activityengagement          See flowsheet documentation for full assessment, interventions and recommendations  Outcome: Progressing  Note:   Limitation: Decreased ADL status, Decreased Safe judgement during ADL, Decreased cognition, Decreased endurance, Decreased self-care trans, Decreased high-level ADLs  Prognosis: Fair  Assessment: Patient participated in Skilled OT session this date with interventions consisting of ADL re training with the use of correct body mechnaics, safety awareness and fall prevention techniques,  therapeutic activities to: increase activity tolerance, increase postural control, increase trunk control and increase OOB/ sitting tolerance   Patient agreeable to OT treatment session, upon arrival patient was found supine in bed  In comparison to previous session, patient with improvements in transfers (SPT)*   Patient requiring verbal cues for correct technique and frequent rest periods  Patient continues to be functioning below baseline level, occupational performance remains limited secondary to factors listed above and increased risk for falls and injury  From OT standpoint, recommendation at time of d/c would be Short Term Rehab  Patient to benefit from continued Occupational Therapy treatment while in the hospital to address deficits as defined above and maximize level of functional independence with ADLs and functional mobility  OT Discharge Recommendation: Short Term Rehab  OT - OK to Discharge:  Yes

## 2019-02-13 NOTE — ASSESSMENT & PLAN NOTE
· Last echo showed EF 40%  · Repeat ECHO demonstrates EF 50% and moderate-severe AS  There is also moderate-severe TR and severe pulmonary HTN  · Note also low albumin  · Anticipate transition to PO regimen including Lasix 40 mg daily today  Will hold off on starting metolazone for now  · I&Os, daily weights  · Weights are stable    · Started low dose metoprolol

## 2019-02-13 NOTE — ASSESSMENT & PLAN NOTE
· Suspect due to volume overload but would obtain serial imaging  Consider thoracentesis if respiratory status decompensates  · Repeat chest x-ray redemonstrates bilateral pulmonary opacities and bilateral effusions, overall unchanged  Patient without respiratory distress  No intervention at this time

## 2019-02-13 NOTE — SOCIAL WORK
Call received from marcy Leon with [facility] [employer] and gave her an update  Explained we need confirmation that nephew wants patient to return [facility]  [phone]  Explained we have no word on dc date at this time and recommendation if for snf  He would like patient to return to [facility] and [facility] referral was made  Discussed with RUTH ANN Roblse who will inform CM of anticipated dc date

## 2019-02-14 PROBLEM — R13.10 DYSPHAGIA: Status: RESOLVED | Noted: 2019-01-01 | Resolved: 2019-01-01

## 2019-02-14 PROBLEM — I21.4 NSTEMI (NON-ST ELEVATED MYOCARDIAL INFARCTION) (HCC): Status: RESOLVED | Noted: 2019-01-01 | Resolved: 2019-01-01

## 2019-02-14 NOTE — PROGRESS NOTES
Heart Failure Service Progress Note - Poncho Chen 82 y o  male MRN: [id number]    Unit/Bed#: [facility] 520-01 Encounter: [id number]      Assessment:    Principal Problem:    Acute on chronic systolic heart failure (HCC)  Active Problems:    Paroxysmal A-fib (HCC)    Pleural effusion    NSTEMI (non-ST elevated myocardial infarction) (HCC)    Dysphagia    Stage 3 chronic kidney disease (HCC)    Chronic respiratory failure with hypoxia (HCC)    Thrombocytopenia (HCC)      Subjective:   Patient seen and examined  No significant events overnight  WWP  MAPS 70-75  AF with HR   Plan for discharge back to [facility] today  Objective: Intake/ Output:  Weight: 1 l/bs, 193 on admit  Tele:No longer on tele     TTE 2018:  LVEF: 40%  LVIDd: 5 2 cm  RV:size at upper limits of normal, systolic function low normal   MR:mild to moderate  PASP:55-60 mmHg  RVOT:   Other:Mild diffuse hypokinesis, wall thickness increased, concentric hypertrophy  Neurohormonal Blockade:  --Beta-Blocker: Metoprolol 12 5 mg PO BID  --ACEi, ARB or ARNi: CKD precludes  (or SVR reduction)  --Aldosterone Receptor Blocker: CKD precludes  --Diuretic: Furosemide 40 mg PO daily  Sudden Cardiac Death Risk Reduction:  --ICD: EF 50%  Interrogation:       Plan: 1  Acute on chronic combined systolic and diastolic CHF, HFpEF, LVIDd 5 2 cm, NYHA Class III/IV symptoms, ACC/AHA stage C  LVEF per TTE this admission actually appears to have improved from last study in June of 2018 and  estimated at 50%  by visual assessment, full report pending  Etiology unclear, but likely NICM and 2/2 longstanding HTN, possible tachy-mediated in the setting of PAF, +/- possible infiltrative cause, +/- ischemia, +/-valvular in the setting of mild to moderate AS  Clinically, volume status improved overall, still has mildly elevated JVP,  warm and well perfused  Weight is down  Creat stable  Started on Lasix 40 mg PO daily yesterday   Continue low dose Metoprolol with plan to eventually  increase dose for better HR control if BP allows     Continue to monitor daily weights, strict I's and O's, BMP  Patient managed by [facility] and ultimately most of patient's care will be deferred to them upon discharge  Plan to return to [facility] today  Recommend close outpatient follow up with [facility] upon discharge  Will order BMP to be checked in a few days       2  HTN  Generally well controlled on current regimen       3  NSTEMI  Troponin peaking at 0 32  Likely 2/2 to #1  No anginal type symptoms at present and no acute EKG changes noted       4  PAF  Currently in atrial fibrillation with ventricular rate of  bpm Continue Metoprolol 12 5 mg BID PO  Likely not an appropriate candidate for oral AC due to risk for falls        5  Mild to Moderate AS       6  CKD  Baseline creat 1 2-1 3  1 25 today                Central Line (day, reason): Chapa catheter (day, reason):    Vitals: Blood pressure 111/64, pulse 96, temperature 97 5 °F (36 4 °C), temperature source Oral, resp  rate 18, height 5' 10" (1 778 m), weight 80 2 kg (176 lb 12 9 oz), SpO2 97 %  , Body mass index is 25 37 kg/m² , I/O last 3 completed shifts: In: 5 [P O :420]  Out: 2085 [Urine:2085]  No intake/output data recorded  Wt Readings from Last 3 Encounters:   02/14/19 80 2 kg (176 lb 12 9 oz)   01/18/19 86 3 kg (190 lb 3 2 oz)   06/25/18 74 7 kg (164 lb 10 9 oz)       Intake/Output Summary (Last 24 hours) at 2/14/2019 1009  Last data filed at 2/14/2019 0534  Gross per 24 hour   Intake 120 ml   Output 1150 ml   Net -1030 ml     I/O last 3 completed shifts:   In: 420 [P O :420]  Out: 2085 [Urine:2085]    Atrial fibrillation with occ PVCs, NSVT       Physical Exam:  Vitals:    02/14/19 0245 02/14/19 0600 02/14/19 0650 02/14/19 0800   BP: 123/68  111/64    BP Location:       Pulse: 87  63 96   Resp: 20 18 18   Temp: 98 1 °F (36 7 °C)  97 5 °F (36 4 °C)    TempSrc: Oral  Oral    SpO2: 98%  98% 97%   Weight:  80 2 kg (176 lb 12 9 oz) Height:           GEN: Michael Alexander appears well, alert and oriented x 3, pleasant and cooperative   HEENT: pupils equal, round, and reactive to light; extraocular muscles intact  NECK: supple, no carotid bruits   HEART: irregular rhythm, normal S1 and S2, no murmurs, clicks, gallops or rubs, mildly elevated JVP     LUNGS: clear to auscultation bilaterally; no wheezes, rales, or rhonchi   ABDOMEN: normal bowel sounds, soft, no tenderness, no distention  EXTREMITIES: peripheral pulses normal; no clubbing, cyanosis, no edema  NEURO: no focal findings   SKIN: normal without suspicious lesions on exposed skin      Current Facility-Administered Medications:     acetaminophen (TYLENOL) tablet 650 mg, 650 mg, Oral, Q6H PRN, Juanice Boots, PA-C    albuterol inhalation solution 2 5 mg, 2 5 mg, Nebulization, Q6H PRN, Juanice Boots, PA-C    aluminum-magnesium hydroxide-simethicone (MYLANTA) 200-200-20 mg/5 mL oral suspension 30 mL, 30 mL, Oral, Q4H PRN, Juanice Boots, PA-C    aspirin chewable tablet 81 mg, 81 mg, Oral, Daily, Juanice Boots, PA-C, 81 mg at 02/14/19 0850    docusate sodium (COLACE) capsule 100 mg, 100 mg, Oral, BID, Juanice Boots, PA-C, 100 mg at 02/14/19 0849    finasteride (PROSCAR) tablet 5 mg, 5 mg, Oral, Daily, Juanice Boots, PA-C, 5 mg at 02/14/19 0849    furosemide (LASIX) tablet 40 mg, 40 mg, Oral, Daily, Angel Sauer MD, 40 mg at 02/14/19 0849    heparin (porcine) subcutaneous injection 5,000 Units, 5,000 Units, Subcutaneous, Q8H [address], Juanice Boots, PA-C, 5,000 Units at 02/14/19 0522    metoprolol succinate (TOPROL-XL) 24 hr tablet 12 5 mg, 12 5 mg, Oral, BID, ELEN Manrique, 12 5 mg at 02/14/19 0849    ondansetron (ZOFRAN) injection 4 mg, 4 mg, Intravenous, Q8H PRN, Lg Casper PA-C    saccharomyces boulardii (FLORASTOR) capsule 250 mg, 250 mg, Oral, BID, Lg Casper PA-C, 250 mg at 02/14/19 0849    senna (SENOKOT) tablet 8 6 mg, 1 tablet, Oral, HS, Pilarcamilla Walter PA-C, 8 6 mg at 02/13/19 2141      Labs & Results:    Results from last 7 days   Lab Units 02/10/19  0438 02/09/19  2222 02/09/19  1511   TROPONIN I ng/mL 0 31* 0 32* 0 28*     Results from last 7 days   Lab Units 02/12/19  0517 02/11/19  0431 02/10/19  0438   WBC Thousand/uL 4 53 4 39 4 29*   HEMOGLOBIN g/dL 11 1* 11 0* 11 1*   HEMATOCRIT % 37 9 36 8 37 8   PLATELETS Thousands/uL  --   --  82*         Results from last 7 days   Lab Units 02/14/19  0439 02/13/19  0535 02/12/19  0517  02/09/19  1511   POTASSIUM mmol/L 4 1 4 6 3 6   < > 4 7   CHLORIDE mmol/L 93* 95* 94*   < > 99*   CO2 mmol/L 42* 40* 41*   < > 39*   BUN mg/dL 48* 50* 52*   < > 49*   CREATININE mg/dL 1 25 1 30 1 35*   < > 1 32*   CALCIUM mg/dL 8 8 8 8 8 7   < > 8 7   ALK PHOS U/L  --   --   --   --  105   ALT U/L  --   --   --   --  27   AST U/L  --   --   --   --  42    < > = values in this interval not displayed  Results from last 7 days   Lab Units 02/09/19  1558   INR  1 08            Counseling / Coordination of Care  Total floor / unit time spent today 20 minutes  Greater than 50% of total time was spent with the patient and / or family counseling and / or coordination of care  A description of the counseling / coordination of care: 20

## 2019-02-14 NOTE — SOCIAL WORK
[facility] faxed to [employer] [facility] at [phone] as requested by  Tosha Walker

## 2019-02-14 NOTE — DISCHARGE SUMMARY
Discharge- [facility] 1935, 82 y o  male MRN: [id number]  Unit/Bed#: [facility] 520-01 Encounter: [id number]  Primary Care Provider: Lauren Moreau DO   Date and time admitted to hospital: 2/9/2019  3:02 PM    * Acute on chronic systolic heart failure (HCC)resolved as of 2/14/2019  Assessment & Plan  · Last echo showed EF 40%  · Repeat ECHO demonstrates EF 50% and moderate-severe AS  There is also moderate-severe TR and severe pulmonary HTN  · Note also low albumin  · Continue with PO Lasix 40 mg daily at discharge  Will hold off on starting metolazone for now  Patient needs to follow up with primary cardiologist as an out-patient  · I&Os, daily weights  · Weights are stable  · Continue low dose metoprolol    Thrombocytopenia (HCC)  Assessment & Plan  · Appears chronic  · Monitor    Chronic respiratory failure with hypoxia (HCC)  Assessment & Plan  · On 2L O2 at baseline    Stage 3 chronic kidney disease (HCC)  Assessment & Plan  · Baseline creatinine: 1 2-1 3  · Monitor with diuresis  · Current creatinine: 1 25    Pleural effusion  Assessment & Plan  · Suspect due to volume overload but would obtain serial imaging  Consider thoracentesis if respiratory status decompensates  · Repeat chest x-ray redemonstrates bilateral pulmonary opacities and bilateral effusions, overall unchanged  Patient without respiratory distress  No intervention at this time  Paroxysmal A-fib (HCC)  Assessment & Plan  · With RVR on admission  · Patient not on anticoagulation or AV gui blockers at baseline  · Started low dose beta blocker  · TSH high  Free T4 within normal limits- repeat labs in 6 weeks    Dysphagiaresolved as of 2/14/2019  Assessment & Plan  · Speech swallow eval unremarkable  · If symptoms persist, consider GI eval at some point to r/o esophageal issue    NSTEMI (non-ST elevated myocardial infarction) (HCC)resolved as of 2/14/2019  Assessment & Plan  · Suspect type 2 related to volume overload  No ACS  Discharging Physician / Practitioner: Hali Mauricio PA-C  PCP: Nadege Tavares DO  Admission Date:   Admission Orders (From admission, onward)    Ordered        02/09/19 1638  Inpatient Admission (expected length of stay for this patient Order details is greater than two midnights)  Once     Order ID Start Status   [id number] 02/09/19 1639 Completed              Discharge Date: 02/14/19    Disposition:      Short Term Rehab or SNF at [address] [place] [address] SNF:   · [facility] ([address]) - No one is listed in [place] Text  Reason for Admission:  Shortness of breath    Discharge Diagnoses:     Please see assessment and plan section above for further details regarding discharge diagnoses  Resolved Problems  Date Reviewed: 2/14/2019          Resolved    * (Principal) Acute on chronic systolic heart failure ([facility] ) 2/14/2019     Resolved by  Hali Mauricio PA-C    NSTEMI (non-ST elevated myocardial infarction) ([facility] ) 2/14/2019     Resolved by  Hali Mauricio PA-C    Dysphagia 2/14/2019     Resolved by  Hali Mauricio PA-C        Consultations During Hospital Stay:  · Cardiology  · Podiatry  · Heart failure team    Procedures Performed:   · Chest x-ray (02/09/2019):  Right greater than left pleural effusions and associated bibasilar opacities  · Echocardiogram (02/10/2019):  EF 50% with moderate left ventricular hypertrophy  Moderate to severe aortic stenosis  Evidence of severe pulmonary hypertension  · Chest x-ray (02/12/2019): Bilateral pulmonary opacities again seen representing multilobar pneumonia or pulmonary edema  Bilateral pleural effusions again noted      Medication Adjustments and Discharge Medications:  · Summary of Medication Adjustments made as a result of this hospitalization:   · Increase Lasix to 40 mg daily  · Hold metolazone  · Medication Dosing Tapers - Please refer to Discharge Medication List for details on any medication dosing tapers (if applicable to patient)  · Medications being temporarily held (include recommended restart time):  Metolazone  · Discharge Medication List: See after visit summary for reconciled discharge medications  Wound Care Recommendations:  When applicable, please see wound care section of After Visit Summary  Diet Recommendations at Discharge:  Diet -        Diet Orders   (From admission, onward)            Start     Ordered    02/09/19 1936  Diet Cardiovascular; Sodium 2 GM; Fluid Restriction 1800 ML  Diet effective now     Question Answer Comment   Diet Type Cardiovascular    Cardiac Sodium 2 GM    Other Restriction(s): Fluid Restriction 1800 ML    RD to adjust diet per protocol? Yes        02/09/19 1935          Instructions for any Catheters / Lines Present at Discharge (including removal date, if applicable): n/a    Significant Findings / Test Results:   · None    Incidental Findings:   · None     Test Results Pending at Discharge (will require follow up): · None     Outpatient Tests Requested:  · BMP    Complications:  None    Hospital Course:     Marycruz Lucas is a 82 y o  male patient who originally presented to the hospital on 2/9/2019 due to shortness of breath  Patient has a known history of systolic heart failure, atrial fibrillation, chronic respiratory failure on nasal cannula oxygen and chronic kidney disease  He typically follows up with [facility]  He was recently hospitalized with acute kidney injury so his IV Lasix was decreased  Since that time he has developed progressive exertional dyspnea and was found to be in volume overload  He was seen in consultation by heart failure team who managed his diuresis  Eventually he was transitioned to an oral diuretic regimen including Lasix 40 mg daily  His metolazone is being held and should not be restarted until he follows up with his primary cardiologist   He was also started on low-dose beta-blockers    His volume status has stabilized and he is medically cleared to be discharged today  Condition at Discharge: stable     Discharge Day Visit / Exam:   Subjective: On the day of discharge the patient is feeling well and states his breathing is at baseline  He has no new complaints and feels ready for discharge  Vitals: Blood Pressure: 111/64 (02/14/19 0650)  Pulse: 96 (02/14/19 0800)  Temperature: 97 5 °F (36 4 °C) (02/14/19 0650)  Temp Source: Oral (02/14/19 0650)  Respirations: 18 (02/14/19 0800)  Height: 5' 10" (177 8 cm) (02/09/19 1800)  Weight - Scale: 80 2 kg (176 lb 12 9 oz) (02/14/19 0600)  SpO2: 97 % (02/14/19 0800)  Exam:   Physical Exam   HENT:   Head: Normocephalic and atraumatic  Mouth/Throat: Oropharynx is clear and moist and mucous membranes are normal    Eyes: No scleral icterus  Cardiovascular: Normal rate and regular rhythm  No murmur heard  Pulmonary/Chest: He has decreased breath sounds  He has no wheezes  He has no rales  He exhibits no tenderness  Abdominal: Soft  Bowel sounds are normal  He exhibits no distension  There is no tenderness  Musculoskeletal: Normal range of motion  He exhibits no edema  Diffuse arthritic changes of joints  Skin: Skin is warm and dry  No rash noted  Psychiatric: He has a normal mood and affect  Vitals reviewed  Discussion with Family: Allen Tejada has been notified throughout hospital stay  Goals of Care Discussions:  · Code Status at Discharge: Level 1 - Full Code  · Were there any Goals of Care Discussions during Hospitalization?: No  · Results of any General Goals of Care Discussions: n/a   · POLST Completed: No   · If POLST Completed, Summary of POLST Agreement Provided Here: n/a   · OK to Rehospitalize if Needed? Yes    Discharge instructions/Information to patient and family:   See after visit summary section titled Discharge Instructions for information provided to patient and family        Planned Readmission: No      Discharge Statement:  I spent 45 minutes discharging the patient  This time was spent on the day of discharge  I had direct contact with the patient on the day of discharge  Greater than 50% of the total time was spent examining patient, answering all patient questions, arranging and discussing plan of care with patient as well as directly providing post-discharge instructions  Additional time then spent on discharge activities      ** Please Note: This note has been constructed using a voice recognition system **

## 2019-02-14 NOTE — ASSESSMENT & PLAN NOTE
· Last echo showed EF 40%  · Repeat ECHO demonstrates EF 50% and moderate-severe AS  There is also moderate-severe TR and severe pulmonary HTN  · Note also low albumin  · Continue with PO Lasix 40 mg daily at discharge  Will hold off on starting metolazone for now  Patient needs to follow up with primary cardiologist as an out-patient  · I&Os, daily weights  · Weights are stable    · Continue low dose metoprolol

## 2019-02-14 NOTE — SOCIAL WORK
Cm contacted Latoya spoke with Niyah to schedule BLS transport [place] [facility] [place] will be picked up between 13:00-14:00  via stretcher  WERNER contacted Bina at [employer] and left message to call Latoya to confirm pick-up and payment  Cm received call back from Maria Estheria and transport is confirmed for between 13:00-14:00

## 2019-02-14 NOTE — DISCHARGE INSTRUCTIONS
Heart Failure   WHAT YOU NEED TO KNOW:   Heart failure (HF) is a condition that does not allow your heart to fill or pump properly  Not enough oxygen in your blood gets to your organs and tissues  HF can occur in the right side, the left side, or both lower chambers of your heart  HF is often caused by damage or injury to your heart  The damage may be caused by heart attack, other heart conditions, or high blood pressure  HF is a long-term condition that tends to get worse over time  It is important to manage your health to improve your quality of life  HF can be worsened by heavy alcohol use, smoking, diabetes that is not controlled, or obesity  DISCHARGE INSTRUCTIONS:   Call 911 if:   · You have any of the following signs of a heart attack:      ¨ Squeezing, pressure, or pain in your chest that lasts longer than 5 minutes or returns    ¨ Discomfort or pain in your back, neck, jaw, stomach, or arm     ¨ Trouble breathing    ¨ Nausea or vomiting    ¨ Lightheadedness or a sudden cold sweat, especially with chest pain or trouble breathing    Seek care immediately if:   · You gain 3 or more pounds (1 4 kg) in a day, or more than your healthcare provider says you should  · Your heartbeat is fast, slow, or uneven all the time  Contact your healthcare provider if:   · You have symptoms of worsening HF:      ¨ Shortness of breath at rest, at night, or that is getting worse in any way     ¨ Weight gain of 5 or more pounds (2 2 kg) in a week     ¨ More swelling in your legs or ankles     ¨ Abdominal pain or swelling     ¨ More coughing     ¨ Loss of appetite     ¨ Feeling tired all the time    · You feel hopeless or depressed, or you have lost interest in things you used to enjoy  · You often feel worried or afraid  · You have questions or concerns about your condition or care  Medicines: You may  need any of the following:  · Medicines  may be given to help regulate your heart rhythm   You may also need medicines to lower your blood pressure, and to get rid of extra fluids  · Take your medicine as directed  Contact your healthcare provider if you think your medicine is not helping or if you have side effects  Tell him or her if you are allergic to any medicine  Keep a list of the medicines, vitamins, and herbs you take  Include the amounts, and when and why you take them  Bring the list or the pill bottles to follow-up visits  Carry your medicine list with you in case of an emergency  Follow up with your healthcare provider or cardiologist as directed: You may need to return for other tests  You may need home health care  A healthcare provider will monitor your vital signs, weight, and make sure your medicines are working  Write down your questions so you remember to ask them during your visits  Go to cardiac rehab as directed:  Cardiac rehab is a program run by specialists who will help you safely strengthen your heart  The program includes exercise, relaxation, stress management, and heart-healthy nutrition  Healthcare providers will also make sure your medicines are helping to reduce your symptoms  Manage your HF:  · Do not smoke  Nicotine and other chemicals in cigarettes and cigars can cause lung damage and make HF difficult to manage  Ask your healthcare provider for information if you currently smoke and need help to quit  E-cigarettes or smokeless tobacco still contain nicotine  Talk to your healthcare provider before you use these products  · Do not drink alcohol or take illegal drugs  Alcohol and drugs can worsen your symptoms quickly  · Weigh yourself every morning  Use the same scale, in the same spot  Do this after you use the bathroom, but before you eat or drink anything  Wear the same type of clothing  Do not wear shoes  Record your weight each day so you will notice any sudden weight gain  Swelling and weight gain are signs of fluid retention   If you are overweight, ask how to lose weight safely  · Check your blood pressure and heart rate every day  Ask for more information about how to measure your blood pressure and heart rate correctly  Ask what these numbers should be for you  · Manage any chronic health conditions you have  These include high blood pressure, diabetes, obesity, high cholesterol, metabolic syndrome, and COPD  You will have fewer symptoms if you manage these health conditions  Follow your healthcare provider's recommendations and follow up with him or her regularly  · Eat heart-healthy foods and limit sodium (salt)  An easy way to do this is to eat more fresh fruits and vegetables and fewer canned and processed foods  Replace butter and margarine with heart-healthy oils such as olive oil and canola oil  Other heart-healthy foods include walnuts, whole-grain breads, low-fat dairy products, beans, and lean meats  Fatty fish such as salmon and tuna are also heart healthy  Ask how much salt you can eat each day  Do not use salt substitutes  · Drink liquids as directed  You may need to limit the amount of liquids you drink if you retain fluid  Ask how much liquid to drink each day and which liquids are best for you  · Stay active  If you are not active, your symptoms are likely to worsen quickly  Walking, bicycling, and other types of physical activity help maintain your strength and improve your mood  Physical activity also helps you manage your weight  Work with your healthcare provider to create an exercise plan that is right for you  · Get vaccines as directed  Get a flu shot every year  You may also need the pneumonia vaccine  The flu and pneumonia can be severe for a person who has HF  Vaccines protect you from these infections  Join a support group:  Living with HF can be difficult  It may be helpful to talk with others who have HF  You may learn how to better manage your condition or get emotional support  For more information:   · [facility] [address] [place] [address]   Phone: 4- 372 - [phone]  Web Address: https://www strong com/  org   © 2017 [employer] Information is for End User's use only and may not be sold, redistributed or otherwise used for commercial purposes  All illustrations and images included in CareNotes® are the copyrighted property of [employer]  or Rohith Randall  The above information is an  only  It is not intended as medical advice for individual conditions or treatments  Talk to your doctor, nurse or pharmacist before following any medical regimen to see if it is safe and effective for you  **RECOMMEND CLOSE OUTPATIENT MONITORING WITH CARDIOLOGY FOLLOW UP WITH [facility] WITHIN THE NEXT WEEK  **

## 2019-02-14 NOTE — PLAN OF CARE
Problem: Potential for Falls  Goal: Patient will remain free of falls  Description  INTERVENTIONS:  - Assess patient frequently for physical needs  -  Identify cognitive and physical deficits and behaviors that affect risk of falls    -  [place] fall precautions as indicated by assessment   - Educate patient/family on patient safety including physical limitations  - Instruct patient to call for assistance with activity based on assessment  - Modify environment to reduce risk of injury  - Consider OT/PT consult to assist with strengthening/mobility   Outcome: Progressing     Problem: Prexisting or High Potential for Compromised Skin Integrity  Goal: Skin integrity is maintained or improved  Description  INTERVENTIONS:  - Identify patients at risk for skin breakdown  - Assess and monitor skin integrity  - Assess and monitor nutrition and hydration status  - Monitor labs (i e  albumin)  - Assess for incontinence   - Turn and reposition patient  - Assist with mobility/ambulation  - Relieve pressure over bony prominences  - Avoid friction and shearing  - Provide appropriate hygiene as needed including keeping skin clean and dry  - Evaluate need for skin moisturizer/barrier cream  - Collaborate with interdisciplinary team (i e  Nutrition, Rehabilitation, etc )   - Patient/family teaching   Outcome: Progressing     Problem: PAIN - ADULT  Goal: Verbalizes/displays adequate comfort level or baseline comfort level  Description  Interventions:  - Encourage patient to monitor pain and request assistance  - Assess pain using appropriate pain scale  - Administer analgesics based on type and severity of pain and evaluate response  - Implement non-pharmacological measures as appropriate and evaluate response  - Consider cultural and social influences on pain and pain management  - Notify physician/advanced practitioner if interventions unsuccessful or patient reports new pain   Outcome: Progressing     Problem: INFECTION - ADULT  Goal: Absence or prevention of progression during hospitalization  Description  INTERVENTIONS:  - Assess and monitor for signs and symptoms of infection  - Monitor lab/diagnostic results  - Monitor all insertion sites, i e  indwelling lines, tubes, and drains  - Monitor endotracheal (as able) and nasal secretions for changes in amount and color  - [place] appropriate cooling/warming therapies per order  - Administer medications as ordered  - Instruct and encourage patient and family to use good hand hygiene technique  - Identify and instruct in appropriate isolation precautions for identified infection/condition   Outcome: Progressing  Goal: Absence of fever/infection during neutropenic period  Description  INTERVENTIONS:  - Monitor WBC  - Implement neutropenic guidelines   Outcome: Progressing     Problem: SAFETY ADULT  Goal: Maintain or return to baseline ADL function  Description  INTERVENTIONS:  -  Assess patient's ability to carry out ADLs; assess patient's baseline for ADL function and identify physical deficits which impact ability to perform ADLs (bathing, care of mouth/teeth, toileting, grooming, dressing, etc )  - Assess/evaluate cause of self-care deficits   - Assess range of motion  - Assess patient's mobility; develop plan if impaired  - Assess patient's need for assistive devices and provide as appropriate  - Encourage maximum independence but intervene and supervise when necessary  ¯ Involve family in performance of ADLs  ¯ Assess for home care needs following discharge   ¯ Request OT consult to assist with ADL evaluation and planning for discharge  ¯ Provide patient education as appropriate   Outcome: Progressing  Goal: Maintain or return mobility status to optimal level  Description  INTERVENTIONS:  - Assess patient's baseline mobility status (ambulation, transfers, stairs, etc )    - Identify cognitive and physical deficits and behaviors that affect mobility  - Identify mobility aids required to assist with transfers and/or ambulation (gait belt, sit-to-stand, lift, walker, cane, etc )  - [place] fall precautions as indicated by assessment  - Record patient progress and toleration of activity level on Mobility SBAR; progress patient to next Phase/Stage  - Instruct patient to call for assistance with activity based on assessment  - Request Rehabilitation consult to assist with strengthening/weightbearing, etc    Outcome: Progressing     Problem: DISCHARGE PLANNING  Goal: Discharge to home or other facility with appropriate resources  Description  INTERVENTIONS:  - Identify barriers to discharge w/patient and caregiver  - Arrange for needed discharge resources and transportation as appropriate  - Identify discharge learning needs (meds, wound care, etc )  - Arrange for interpretive services to assist at discharge as needed  - Refer to Case Management Department for coordinating discharge planning if the patient needs post-hospital services based on physician/advanced practitioner order or complex needs related to functional status, cognitive ability, or social support system   Outcome: Progressing     Problem: Knowledge Deficit  Goal: Patient/family/caregiver demonstrates understanding of disease process, treatment plan, medications, and discharge instructions  Description  Complete learning assessment and assess knowledge base    Interventions:  - Provide teaching at level of understanding  - Provide teaching via preferred learning methods   Outcome: Progressing     Problem: CARDIOVASCULAR - ADULT  Goal: Maintains optimal cardiac output and hemodynamic stability  Description  INTERVENTIONS:  - Monitor I/O, vital signs and rhythm  - Monitor for S/S and trends of decreased cardiac output i e  bleeding, hypotension  - Administer and titrate ordered vasoactive medications to optimize hemodynamic stability  - Assess quality of pulses, skin color and temperature  - Assess for signs of decreased coronary artery perfusion - ex   Angina  - Instruct patient to report change in severity of symptoms   Outcome: Progressing  Goal: Absence of cardiac dysrhythmias or at baseline rhythm  Description  INTERVENTIONS:  - Continuous cardiac monitoring, monitor vital signs, obtain 12 lead EKG if indicated  - Administer antiarrhythmic and heart rate control medications as ordered  - Monitor electrolytes and administer replacement therapy as ordered   Outcome: Progressing     Problem: RESPIRATORY - ADULT  Goal: Achieves optimal ventilation and oxygenation  Description  INTERVENTIONS:  - Assess for changes in respiratory status  - Assess for changes in mentation and behavior  - Position to facilitate oxygenation and minimize respiratory effort  - Oxygen administration by appropriate delivery method based on oxygen saturation (per order) or ABGs  - Initiate smoking cessation education as indicated  - Encourage broncho-pulmonary hygiene including cough, deep breathe, Incentive Spirometry  - Assess the need for suctioning and aspirate as needed  - Assess and instruct to report SOB or any respiratory difficulty  - Respiratory Therapy support as indicated   Outcome: Progressing     Problem: SKIN/TISSUE INTEGRITY - ADULT  Goal: Skin integrity remains intact  Description  INTERVENTIONS  - Identify patients at risk for skin breakdown  - Assess and monitor skin integrity  - Assess and monitor nutrition and hydration status  - Monitor labs (i e  albumin)  - Assess for incontinence   - Turn and reposition patient  - Assist with mobility/ambulation  - Relieve pressure over bony prominences  - Avoid friction and shearing  - Provide appropriate hygiene as needed including keeping skin clean and dry  - Evaluate need for skin moisturizer/barrier cream  - Collaborate with interdisciplinary team (i e  Nutrition, Rehabilitation, etc )   - Patient/family teaching   Outcome: Progressing  Goal: Incision(s), wounds(s) or drain site(s) healing without S/S of infection  Description  INTERVENTIONS  - Assess and document risk factors for skin impairment   - Assess and document dressing, incision, wound bed, drain sites and surrounding tissue  - Initiate Nutrition services consult and/or wound management as needed   Outcome: Progressing  Goal: Oral mucous membranes remain intact  Description  INTERVENTIONS  - Assess oral mucosa and hygiene practices  - Implement preventative oral hygiene regimen  - Implement oral medicated treatments as ordered  - Initiate Nutrition services referral as needed   Outcome: Progressing     Problem: DISCHARGE PLANNING - CARE MANAGEMENT  Goal: Discharge to post-acute care or home with appropriate resources  Description  INTERVENTIONS:  - Conduct assessment to determine patient/family and health care team treatment goals, and need for post-acute services based on payer coverage, community resources, and patient preferences, and barriers to discharge  - Address psychosocial, clinical, and financial barriers to discharge as identified in assessment in conjunction with the patient/family and health care team  - Arrange appropriate level of post-acute services according to patient?s   needs and preference and payer coverage in collaboration with the physician and health care team  - Communicate with and update the patient/family, physician, and health care team regarding progress on the discharge plan  - Arrange appropriate transportation to post-acute venues  Outcome: Progressing     Problem: Nutrition/Hydration-ADULT  Goal: Nutrient/Hydration intake appropriate for improving, restoring or maintaining nutritional needs  Description  Monitor and assess patient's nutrition/hydration status for malnutrition (ex- brittle hair, bruises, dry skin, pale skin and conjunctiva, muscle wasting, smooth red tongue, and disorientation)  Collaborate with interdisciplinary team and initiate plan and interventions as ordered    Monitor patient's weight and dietary intake as ordered or per policy  Utilize nutrition screening tool and intervene per policy  Determine patient's food preferences and provide high-protein, high-caloric foods as appropriate       INTERVENTIONS:  - Monitor oral intake, urinary output, labs, and treatment plans  - Assess nutrition and hydration status and recommend course of action  - Evaluate amount of meals eaten  - Assist patient with eating if necessary   - Allow adequate time for meals  - Recommend/ encourage appropriate diets, oral nutritional supplements, and vitamin/mineral supplements  - Order, calculate, and assess calorie counts as needed  - Recommend, monitor, and adjust tube feedings and TPN/PPN based on assessed needs  - Assess need for intravenous fluids  - Provide specific nutrition/hydration education as appropriate  - Include patient/family/caregiver in decisions related to nutrition  Outcome: Progressing

## 2019-02-15 NOTE — PROGRESS NOTES
Nurse Sunny Mesa at facility reports patient is on their HF pathway with daily weights, cardiac diet and assessments  His weight today is 176 6, he has no symptoms  Sunny Mesa states she reviewed discharge orders with [facility] and they ordered labs BNP and CMP  States [facility] is aware of cardiology appointment on 2/25 and will arrange transport

## 2019-02-18 NOTE — UTILIZATION REVIEW
Discharge- Marycruz Lucas 1935, 82 y o  male MRN: [id number]  Unit/Bed#: [facility] 520-01 Encounter: [id number]  Primary Care Provider: Raul Francis DO   Date and time admitted to hospital: 2/9/2019  3:02 PM     * Acute on chronic systolic heart failure (HCC)resolved as of 2/14/2019  Assessment & Plan  · Last echo showed EF 40%  · Repeat ECHO demonstrates EF 50% and moderate-severe AS  There is also moderate-severe TR and severe pulmonary HTN  · Note also low albumin  · Continue with PO Lasix 40 mg daily at discharge  Will hold off on starting metolazone for now  Patient needs to follow up with primary cardiologist as an out-patient  · I&Os, daily weights  ? Weights are stable  · Continue low dose metoprolol     Thrombocytopenia (HCC)  Assessment & Plan  · Appears chronic  · Monitor     Chronic respiratory failure with hypoxia (HCC)  Assessment & Plan  · On 2L O2 at baseline     Stage 3 chronic kidney disease (HCC)  Assessment & Plan  · Baseline creatinine: 1 2-1 3  · Monitor with diuresis  · Current creatinine: 1 25     Pleural effusion  Assessment & Plan  · Suspect due to volume overload but would obtain serial imaging  Consider thoracentesis if respiratory status decompensates  · Repeat chest x-ray redemonstrates bilateral pulmonary opacities and bilateral effusions, overall unchanged  Patient without respiratory distress  No intervention at this time      Paroxysmal A-fib ([address] )  Assessment & Plan  · With RVR on admission  · Patient not on anticoagulation or AV gui blockers at baseline  · Started low dose beta blocker  · TSH high  Free T4 within normal limits- repeat labs in 6 weeks     Dysphagiaresolved as of 2/14/2019  Assessment & Plan  · Speech swallow eval unremarkable    · If symptoms persist, consider GI eval at some point to r/o esophageal issue     NSTEMI (non-ST elevated myocardial infarction) (HCC)resolved as of 2/14/2019  Assessment & Plan  · Suspect type 2 related to volume overload  No ACS        Discharging Physician / Practitioner: Hali Mauricio PA-C  PCP: Nadege Tavares DO  Admission Date:          Admission Orders (From admission, onward)     Ordered         02/09/19 1638   Inpatient Admission (expected length of stay for this patient Order details is greater than two midnights)  Once     Order ID Start Status   [id number] 02/09/19 1639 Completed                Discharge Date: 02/14/19     Disposition:       Short Term Rehab or SNF at [address] to [facility] SNF:   · [facility] ([address]) - [facility]      Reason for Admission:  Shortness of breath     Discharge Diagnoses:      Please see assessment and plan section above for further details regarding discharge diagnoses                Resolved Problems  Date Reviewed: 2/14/2019           Resolved     * (Principal) Acute on chronic systolic heart failure ([facility] [address] ) 2/14/2019       Resolved by  Hali Mauricio PA-C     NSTEMI (non-ST elevated myocardial infarction) ([facility] [address] ) 2/14/2019       Resolved by  Hali Mauricio PA-C     Dysphagia 2/14/2019       Resolved by  Hali Mauricio PA-C          Consultations During Hospital Stay:  · Cardiology  · Podiatry  · Heart failure team     Procedures Performed:   · Chest x-ray (02/09/2019):  Right greater than left pleural effusions and associated bibasilar opacities  · Echocardiogram (02/10/2019):  EF 50% with moderate left ventricular hypertrophy  Moderate to severe aortic stenosis  Evidence of severe pulmonary hypertension  · Chest x-ray (02/12/2019): Bilateral pulmonary opacities again seen representing multilobar pneumonia or pulmonary edema  Bilateral pleural effusions again noted      Medication Adjustments and Discharge Medications:  · Summary of Medication Adjustments made as a result of this hospitalization:   ? Increase Lasix to 40 mg daily  ?  Hold metolazone  · Medication Dosing Tapers - Please refer to Discharge Medication List for details on any medication dosing tapers (if applicable to patient)  · Medications being temporarily held (include recommended restart time):  Metolazone  · Discharge Medication List: See after visit summary for reconciled discharge medications       Wound Care Recommendations:  When applicable, please see wound care section of After Visit Summary      Diet Recommendations at Discharge:  Diet -                  Diet Orders   (From admission, onward)                       Start     Ordered     02/09/19 1936   Diet Cardiovascular; Sodium 2 GM; Fluid Restriction 1800 ML  Diet effective now     Question Answer Comment   Diet Type Cardiovascular     Cardiac Sodium 2 GM     Other Restriction(s): Fluid Restriction 1800 ML     RD to adjust diet per protocol? Yes         02/09/19 1935             Instructions for any Catheters / Lines Present at Discharge (including removal date, if applicable): n/a     Significant Findings / Test Results:   · None     Incidental Findings:   · None      Test Results Pending at Discharge (will require follow up): · None     Outpatient Tests Requested:  · BMP     Complications:  None     Hospital Course:      Marycruz Lucas is a 82 y o  male patient who originally presented to the hospital on 2/9/2019 due to shortness of breath  Patient has a known history of systolic heart failure, atrial fibrillation, chronic respiratory failure on nasal cannula oxygen and chronic kidney disease  He typically follows up with [facility]  He was recently hospitalized with acute kidney injury so his IV Lasix was decreased  Since that time he has developed progressive exertional dyspnea and was found to be in volume overload  He was seen in consultation by heart failure team who managed his diuresis  Eventually he was transitioned to an oral diuretic regimen including Lasix 40 mg daily    His metolazone is being held and should not be restarted until he follows up with his primary cardiologist   He was also started on low-dose beta-blockers  His volume status has stabilized and he is medically cleared to be discharged today      Condition at Discharge: stable      Discharge Day Visit / Exam:   Subjective: On the day of discharge the patient is feeling well and states his breathing is at baseline  He has no new complaints and feels ready for discharge      Vitals: Blood Pressure: 111/64 (02/14/19 0650)  Pulse: 96 (02/14/19 0800)  Temperature: 97 5 °F (36 4 °C) (02/14/19 0650)  Temp Source: Oral (02/14/19 0650)  Respirations: 18 (02/14/19 0800)  Height: 5' 10" (177 8 cm) (02/09/19 1800)  Weight - Scale: 80 2 kg (176 lb 12 9 oz) (02/14/19 0600)  SpO2: 97 % (02/14/19 0800)  Exam:   Physical Exam   HENT:   Head: Normocephalic and atraumatic  Mouth/Throat: Oropharynx is clear and moist and mucous membranes are normal    Eyes: No scleral icterus  Cardiovascular: Normal rate and regular rhythm  No murmur heard  Pulmonary/Chest: He has decreased breath sounds  He has no wheezes  He has no rales  He exhibits no tenderness  Abdominal: Soft  Bowel sounds are normal  He exhibits no distension  There is no tenderness  Musculoskeletal: Normal range of motion  He exhibits no edema  Diffuse arthritic changes of joints  Skin: Skin is warm and dry  No rash noted  Psychiatric: He has a normal mood and affect  Vitals reviewed      Discussion with Family: Jennifer Hodge has been notified throughout hospital stay      Goals of Care Discussions:  · Code Status at Discharge: Level 1 - Full Code  · Were there any Goals of Care Discussions during Hospitalization?: No  · Results of any General Goals of Care Discussions: n/a   · POLST Completed: No   · If POLST Completed, Summary of POLST Agreement Provided Here: n/a   · OK to Rehospitalize if Needed?  Yes     Discharge instructions/Information to patient and family:   See after visit summary section titled Discharge Instructions for information provided to patient and family        Planned Readmission: No      Discharge Statement:  I spent 45 minutes discharging the patient  This time was spent on the day of discharge  I had direct contact with the patient on the day of discharge  Greater than 50% of the total time was spent examining patient, answering all patient questions, arranging and discussing plan of care with patient as well as directly providing post-discharge instructions    Additional time then spent on discharge activities      ** Please Note: This note has been constructed using a voice recognition system **                Cosigned by: Louise Wyatt DO at 2/14/2019  4:09 PM   Revision History      b

## 2019-02-25 NOTE — PROGRESS NOTES
Patient was a no show for his cardiology appointment- Stephanie Fong states must have been missed  I had confirmed appointment at previous outreach  He will have unit clerk call tomorrow to reschedule  Stephanie Fong states patient is stable with no acute symptoms  Patient is on daily weights and his weight today is 175 4 and have been stable with in a pound or 2  Labs were done and "look good " Requested they get faxed to our office

## 2019-02-28 NOTE — PROGRESS NOTES
Call to [address] [facility] to follow up on missed appointment- nurse recommended I call [facility]  I left message for patient's nurse "Salma Garcia" to return call

## 2019-03-20 NOTE — PATIENT INSTRUCTIONS
Recommend doubling lasix to twice daily when weight is up 4 lbs  He has very edematous legs but I am not sure where he normally is at  As this is my first time seeing  Please check daily weights and contact the heart failure program at [phone] if you gain 3 lb in one day or 5 lb in one week  Please limit daily sodium intake to 2000 mg daily  Please limit daily fluid intake to 2000 ml daily  Bring complete list of medications to your follow up appointment

## 2019-03-20 NOTE — PROGRESS NOTES
Heart Failure Outpatient Consult Note - Ly Arreguin 82 y o  male MRN: [id number]    @ Encounter: [id number]      Assessment/Plan:    Patient Active Problem List    Diagnosis Date Noted    Thrombocytopenia ([facility] ) 02/10/2019    Pleural effusion 02/09/2019    Stage 3 chronic kidney disease ([place] [address] ) 02/09/2019    Chronic respiratory failure with hypoxia (HCC) 02/09/2019    Nodule of kidney 01/21/2019    Acute kidney injury ([place] [address] ) 01/18/2019    Ambulatory dysfunction 01/18/2019    Pleural effusion, bilateral 06/22/2018    Chronic venous insufficiency 06/21/2018    Type 2 myocardial infarction ([place] [address] ) 06/21/2018    Paroxysmal A-fib ([place] [address] )     Essential hypertension     Cardiomyopathy ([place] [address] )        # HFpEF, Stage C, NYHA 3  Etiology: Post hospital visit- possible due to afib, contribution of valvular heart disease  Med Rx: Lasix 40 mg daily  Weight: 176 lbs on 2/14 (admitted at 193 lbs)    NT pro BNP 3/2/19: 556    Echocardiogram  2/10/19  LVEF: 50%, moderate concentric LVH  LVIDd:  RV: moderately dilated, mildy reduced  MR:  PASP: 60 mmHg  RVOT:   Other: IAS bows right to left    Lab Results   Component Value Date    NTBNP 15,306 (H) 02/09/2019      # HTN- metoprolol succinate 12 5 mg BID  # moderate to severe AS (GAUTAM 0 7 to 1 with mean gradient < 20 mmHg)  # moderate to severe TR  # CKD, Cr 1 45 on 3/2/19  # PAF- was in afib during hospital, not AC candidate due to frequent falls  # [facility], meds through [facility]    Today's Plan:  I am not sure where his baseline is with his edema in his legs but has a lot on board in his legs  meds through [facility], recommend doubling lasix when weight is up 4 lbs and then decreasing back down  --2g sodium diet  Weights daily    HPI:      83 yo male with history of HFpEF, moderate to severe AS, CKD, managed by [employer] is following up post hospitalization for acute decompensated heart failure  He was admitted at 193 lbs  2/14 weight was 176 lbs   He has known PAF and was in afib during hospital stay  Not on AC due to frequent falls  Since the hospital doing ok, he is in wheel chair so functionally it is difficult to assess symptoms with exertion  Lives alone  Past Medical History:   Diagnosis Date    Cardiomyopathy [facility])     with EF of 40 % 12/15    Cellulitis of lower extremity 6/21/2018    Cervical spinal stenosis     Cervical spondylosis     CHF (congestive heart failure) (HCC)     Chronic kidney disease     Chronic venous stasis dermatitis of both lower extremities     DDD (degenerative disc disease), lumbar     Degenerative cervical disc     Discitis of lumbar region     Elevated troponin 6/21/2018    History of BPH     Hypertension     Lumbar canal stenosis     Myocardial infarction (HCC)     OA (osteoarthritis)     Osteomyelitis (HCC)     Paroxysmal A-fib (HCC)     Pneumonia of both lower lobes due to infectious organism ([facility] ) 6/21/2018    Spinal stenosis     Urinary retention        Review of Systems - he denies any new symptoms, has care aids to help him as he lives alone    Allergies   Allergen Reactions    Morphine            Current Outpatient Medications:     acetaminophen (TYLENOL) 325 mg tablet, Take 2 tablets by mouth every 4 (four) hours as needed, Disp: , Rfl:     albuterol (2 5 mg/3 mL) 0 083 % nebulizer solution, Take 2 5 mg by nebulization every 6 (six) hours as needed for wheezing or shortness of breath, Disp: , Rfl:     aspirin 81 mg chewable tablet, Chew 1 tablet (81 mg total) daily, Disp: 30 tablet, Rfl: 0    atorvastatin (LIPITOR) 10 mg tablet, Take 10 mg by mouth, Disp: , Rfl:     bifidobacterium infantis (ALIGN) capsule, Take 1 capsule by mouth daily, Disp: , Rfl:     finasteride (PROSCAR) 5 mg tablet, Take 5 mg by mouth daily, Disp: , Rfl:     furosemide (LASIX) 40 mg tablet, Take 1 tablet (40 mg total) by mouth daily, Disp: , Rfl: 0    metoprolol succinate (TOPROL-XL) 25 mg 24 hr tablet, Take 0 5 tablets (12 5 mg total) by mouth 2 (two) times a day, Disp: , Rfl: 0    tamsulosin (FLOMAX) 0 4 mg, Take 0 4 mg by mouth, Disp: , Rfl:     Social History     Socioeconomic History    Marital status: Single     Spouse name: Not on file    Number of children: Not on file    Years of education: Not on file    Highest education level: Not on file   Occupational History    Not on file   Social Needs    Financial resource strain: Not on file    Food insecurity:     Worry: Not on file     Inability: Not on file    Transportation needs:     Medical: Not on file     Non-medical: Not on file   Tobacco Use    Smoking status: Former Smoker     Packs/day: 3 00     Types: Cigarettes     Last attempt to quit: 1960     Years since quittin 2    Smokeless tobacco: Never Used   Substance and Sexual Activity    Alcohol use: Never     Frequency: Never    Drug use: No    Sexual activity: Not on file   Lifestyle    Physical activity:     Days per week: Not on file     Minutes per session: Not on file    Stress: Not on file   Relationships    Social connections:     Talks on phone: Not on file     Gets together: Not on file     Attends Confucianist service: Not on file     Active member of club or organization: Not on file     Attends meetings of clubs or organizations: Not on file     Relationship status: Not on file    Intimate partner violence:     Fear of current or ex partner: Not on file     Emotionally abused: Not on file     Physically abused: Not on file     Forced sexual activity: Not on file   Other Topics Concern    Not on file   Social History Narrative    Not on file       No family history on file      Physical Exam:    Vitals: Blood pressure 98/62, pulse 68 , There is no height or weight on file to calculate BMI ,   Wt Readings from Last 3 Encounters:   19 80 2 kg (176 lb 12 9 oz)   19 86 3 kg (190 lb 3 2 oz)   18 74 7 kg (164 lb 10 9 oz)       Wt Readings from Last 3 Encounters:   19 80 2 kg (176 lb 12 9 oz)   01/18/19 86 3 kg (190 lb 3 2 oz)   06/25/18 74 7 kg (164 lb 10 9 oz)       Physical Exam:    GEN: Hilario Alexander appears well, alert and oriented x 3, pleasant and cooperative   HEENT: pupils equal, round, and reactive to light; extraocular muscles intact  NECK: supple, no carotid bruits   HEART: regular rhythm, normal S1 and S2, no murmurs, clicks, gallops or rubs, JVP is down   LUNGS: clear to auscultation bilaterally; no wheezes, rales, or rhonchi   ABDOMEN: normal bowel sounds, soft, no tenderness, no distention  EXTREMITIES: peripheral pulses normal; no clubbing, cyanosis, 3+ edema  NEURO: no focal findings   SKIN: normal without suspicious lesions on exposed skin    Labs & Results:    Lab Results   Component Value Date    WBC 4 53 02/12/2019    HGB 11 1 (L) 02/12/2019    HCT 37 9 02/12/2019     (H) 02/12/2019    PLT  02/12/2019      Comment:      Not reported due to platelet clumping  Lab Results   Component Value Date    CALCIUM 8 8 02/14/2019    SODIUM 139 02/14/2019    K 4 1 02/14/2019    CO2 42 (H) 02/14/2019    CL 93 (L) 02/14/2019    BUN 48 (H) 02/14/2019    CREATININE 1 25 02/14/2019     Lab Results   Component Value Date    NTBNP 15,306 (H) 02/09/2019      No results found for: CHOL  No results found for: HDL  No results found for: LDLCALC  No results found for: TRIG  No results found for: [place], [place]    EKG personally reviewed by Frederick Bell DO  Counseling / Coordination of Care  Total floor / unit time spent today 40 minutes  Greater than 50% of total time was spent with the patient and / or family counseling and / or coordination of care  A description of the counseling / coordination of care: 25 min  Thank you for the opportunity to participate in the care of this patient  Frederick WELCH    Director of Advanced Heart Failure Program  Medical Director [facility]

## 2019-04-06 PROBLEM — I50.33 ACUTE ON CHRONIC DIASTOLIC CHF (CONGESTIVE HEART FAILURE) (HCC): Status: ACTIVE | Noted: 2019-01-01

## 2019-04-06 PROBLEM — E87.5 HYPERKALEMIA: Status: ACTIVE | Noted: 2019-01-01

## 2019-04-06 PROBLEM — R65.10 SIRS (SYSTEMIC INFLAMMATORY RESPONSE SYNDROME) (HCC): Status: ACTIVE | Noted: 2019-01-01

## 2019-04-06 PROBLEM — N17.9 ACUTE RENAL FAILURE SUPERIMPOSED ON STAGE 3 CHRONIC KIDNEY DISEASE (HCC): Status: ACTIVE | Noted: 2019-01-01

## 2019-04-07 PROBLEM — D53.9 MACROCYTIC ANEMIA: Status: ACTIVE | Noted: 2019-01-01

## 2019-04-11 PROBLEM — E87.5 HYPERKALEMIA: Status: RESOLVED | Noted: 2019-01-01 | Resolved: 2019-01-01

## 2019-04-11 PROBLEM — E87.0 HYPERNATREMIA: Status: ACTIVE | Noted: 2019-01-01

## 2019-04-11 PROBLEM — R65.10 SIRS (SYSTEMIC INFLAMMATORY RESPONSE SYNDROME) (HCC): Status: RESOLVED | Noted: 2019-01-01 | Resolved: 2019-01-01

## 2019-04-12 PROBLEM — I95.9 HYPOTENSION: Status: ACTIVE | Noted: 2019-01-01

## 2019-04-12 PROBLEM — N17.0 ATN (ACUTE TUBULAR NECROSIS) (HCC): Status: ACTIVE | Noted: 2019-01-01

## 2019-04-12 PROBLEM — I13.0: Chronic | Status: ACTIVE | Noted: 2019-01-01

## 2019-04-13 PROBLEM — Z71.89 GOALS OF CARE, COUNSELING/DISCUSSION: Status: ACTIVE | Noted: 2019-01-01
